# Patient Record
Sex: FEMALE | Race: WHITE | Employment: OTHER | URBAN - METROPOLITAN AREA
[De-identification: names, ages, dates, MRNs, and addresses within clinical notes are randomized per-mention and may not be internally consistent; named-entity substitution may affect disease eponyms.]

---

## 2018-01-01 ENCOUNTER — APPOINTMENT (OUTPATIENT)
Dept: GENERAL RADIOLOGY | Facility: CLINIC | Age: 68
DRG: 270 | End: 2018-01-01
Attending: INTERNAL MEDICINE
Payer: MEDICARE

## 2018-01-01 ENCOUNTER — APPOINTMENT (OUTPATIENT)
Dept: GENERAL RADIOLOGY | Facility: OTHER | Age: 68
End: 2018-01-01
Attending: PHYSICIAN ASSISTANT
Payer: MEDICARE

## 2018-01-01 ENCOUNTER — TRANSFERRED RECORDS (OUTPATIENT)
Dept: HEALTH INFORMATION MANAGEMENT | Facility: OTHER | Age: 68
End: 2018-01-01

## 2018-01-01 ENCOUNTER — ANCILLARY PROCEDURE (OUTPATIENT)
Dept: CARDIOLOGY | Facility: CLINIC | Age: 68
DRG: 270 | End: 2018-01-01
Attending: INTERNAL MEDICINE
Payer: MEDICARE

## 2018-01-01 ENCOUNTER — HOSPITAL ENCOUNTER (EMERGENCY)
Facility: OTHER | Age: 68
Discharge: ANOTHER HOSPITAL | End: 2018-09-09
Attending: PHYSICIAN ASSISTANT | Admitting: PHYSICIAN ASSISTANT
Payer: MEDICARE

## 2018-01-01 ENCOUNTER — HOSPITAL ENCOUNTER (INPATIENT)
Facility: CLINIC | Age: 68
LOS: 18 days | DRG: 270 | End: 2019-01-17
Attending: INTERNAL MEDICINE | Admitting: INTERNAL MEDICINE
Payer: MEDICARE

## 2018-01-01 VITALS
HEIGHT: 60 IN | HEART RATE: 69 BPM | RESPIRATION RATE: 20 BRPM | OXYGEN SATURATION: 94 % | SYSTOLIC BLOOD PRESSURE: 118 MMHG | DIASTOLIC BLOOD PRESSURE: 71 MMHG | TEMPERATURE: 98 F

## 2018-01-01 DIAGNOSIS — R79.89 ELEVATED TROPONIN: ICD-10-CM

## 2018-01-01 DIAGNOSIS — I47.20 VENTRICULAR TACHYCARDIA (H): ICD-10-CM

## 2018-01-01 DIAGNOSIS — K92.1 GASTROINTESTINAL HEMORRHAGE WITH MELENA: ICD-10-CM

## 2018-01-01 DIAGNOSIS — I50.43 ACUTE ON CHRONIC COMBINED SYSTOLIC AND DIASTOLIC CONGESTIVE HEART FAILURE (H): ICD-10-CM

## 2018-01-01 DIAGNOSIS — E87.8 ELECTROLYTE ABNORMALITY: ICD-10-CM

## 2018-01-01 DIAGNOSIS — I47.20 VT (VENTRICULAR TACHYCARDIA) (H): ICD-10-CM

## 2018-01-01 DIAGNOSIS — R57.0 CARDIOGENIC SHOCK (H): Primary | ICD-10-CM

## 2018-01-01 LAB
ABO + RH BLD: NORMAL
ABO + RH BLD: NORMAL
ALBUMIN SERPL-MCNC: 3.6 G/DL (ref 3.4–5)
ALBUMIN SERPL-MCNC: 3.7 G/DL (ref 3.4–5)
ALBUMIN SERPL-MCNC: 4.2 G/DL (ref 3.5–5.7)
ALP SERPL-CCNC: 53 U/L (ref 40–150)
ALP SERPL-CCNC: 55 U/L (ref 34–104)
ALP SERPL-CCNC: 57 U/L (ref 40–150)
ALT SERPL W P-5'-P-CCNC: 20 U/L (ref 7–52)
ALT SERPL W P-5'-P-CCNC: 84 U/L (ref 0–50)
ALT SERPL W P-5'-P-CCNC: 92 U/L (ref 0–50)
ANION GAP SERPL CALCULATED.3IONS-SCNC: 10 MMOL/L (ref 3–14)
ANION GAP SERPL CALCULATED.3IONS-SCNC: 10 MMOL/L (ref 3–14)
ANION GAP SERPL CALCULATED.3IONS-SCNC: 11 MMOL/L (ref 3–14)
ANION GAP SERPL CALCULATED.3IONS-SCNC: 11 MMOL/L (ref 3–14)
ANION GAP SERPL CALCULATED.3IONS-SCNC: 5 MMOL/L (ref 3–14)
ANION GAP SERPL CALCULATED.3IONS-SCNC: 8 MMOL/L (ref 3–14)
ANION GAP SERPL CALCULATED.3IONS-SCNC: 9 MMOL/L (ref 3–14)
AST SERPL W P-5'-P-CCNC: 21 U/L (ref 13–39)
AST SERPL W P-5'-P-CCNC: 45 U/L (ref 0–45)
AST SERPL W P-5'-P-CCNC: 56 U/L (ref 0–45)
BASE DEFICIT BLDV-SCNC: 0.2 MMOL/L
BASE DEFICIT BLDV-SCNC: 1.1 MMOL/L
BASE DEFICIT BLDV-SCNC: 11 MMOL/L
BASE DEFICIT BLDV-SCNC: 2.1 MMOL/L
BASE DEFICIT BLDV-SCNC: 6.1 MMOL/L
BASE DEFICIT BLDV-SCNC: 9.7 MMOL/L
BASE EXCESS BLDA CALC-SCNC: 10.8 MMOL/L
BASE EXCESS BLDA CALC-SCNC: 4.5 MMOL/L
BASE EXCESS BLDA CALC-SCNC: 6.2 MMOL/L
BASE EXCESS BLDA CALC-SCNC: 6.5 MMOL/L
BASE EXCESS BLDA CALC-SCNC: 8.8 MMOL/L
BASE EXCESS BLDV CALC-SCNC: 1.7 MMOL/L
BASE EXCESS BLDV CALC-SCNC: 2.2 MMOL/L
BASE EXCESS BLDV CALC-SCNC: 4.8 MMOL/L
BASE EXCESS BLDV CALC-SCNC: 6.3 MMOL/L
BASE EXCESS BLDV CALC-SCNC: 6.6 MMOL/L
BASOPHILS # BLD AUTO: 0 10E9/L (ref 0–0.2)
BASOPHILS NFR BLD AUTO: 0.3 %
BILIRUB SERPL-MCNC: 1 MG/DL (ref 0.2–1.3)
BILIRUB SERPL-MCNC: 1.1 MG/DL (ref 0.3–1)
BILIRUB SERPL-MCNC: 1.2 MG/DL (ref 0.2–1.3)
BLD GP AB SCN SERPL QL: NORMAL
BLOOD BANK CMNT PATIENT-IMP: NORMAL
BUN SERPL-MCNC: 27 MG/DL (ref 7–30)
BUN SERPL-MCNC: 28 MG/DL (ref 7–30)
BUN SERPL-MCNC: 31 MG/DL (ref 7–30)
BUN SERPL-MCNC: 32 MG/DL (ref 7–30)
BUN SERPL-MCNC: 33 MG/DL (ref 7–30)
BUN SERPL-MCNC: 34 MG/DL (ref 7–30)
BUN SERPL-MCNC: 36 MG/DL (ref 7–25)
CA-I BLD-MCNC: 4.4 MG/DL (ref 4.4–5.2)
CALCIUM SERPL-MCNC: 7.5 MG/DL (ref 8.5–10.1)
CALCIUM SERPL-MCNC: 8.2 MG/DL (ref 8.5–10.1)
CALCIUM SERPL-MCNC: 8.2 MG/DL (ref 8.5–10.1)
CALCIUM SERPL-MCNC: 8.4 MG/DL (ref 8.5–10.1)
CALCIUM SERPL-MCNC: 8.9 MG/DL (ref 8.6–10.3)
CHLORIDE SERPL-SCNC: 102 MMOL/L (ref 94–109)
CHLORIDE SERPL-SCNC: 90 MMOL/L (ref 94–109)
CHLORIDE SERPL-SCNC: 90 MMOL/L (ref 94–109)
CHLORIDE SERPL-SCNC: 92 MMOL/L (ref 94–109)
CHLORIDE SERPL-SCNC: 94 MMOL/L (ref 94–109)
CHLORIDE SERPL-SCNC: 94 MMOL/L (ref 98–107)
CHLORIDE SERPL-SCNC: 97 MMOL/L (ref 94–109)
CO2 SERPL-SCNC: 24 MMOL/L (ref 20–32)
CO2 SERPL-SCNC: 24 MMOL/L (ref 21–31)
CO2 SERPL-SCNC: 26 MMOL/L (ref 20–32)
CO2 SERPL-SCNC: 27 MMOL/L (ref 20–32)
CO2 SERPL-SCNC: 27 MMOL/L (ref 20–32)
CO2 SERPL-SCNC: 29 MMOL/L (ref 20–32)
CO2 SERPL-SCNC: 31 MMOL/L (ref 20–32)
CREAT SERPL-MCNC: 1.42 MG/DL (ref 0.52–1.04)
CREAT SERPL-MCNC: 1.42 MG/DL (ref 0.6–1.2)
CREAT SERPL-MCNC: 1.58 MG/DL (ref 0.52–1.04)
CREAT SERPL-MCNC: 1.96 MG/DL (ref 0.52–1.04)
CREAT SERPL-MCNC: 2.05 MG/DL (ref 0.52–1.04)
CREAT SERPL-MCNC: 2.16 MG/DL (ref 0.52–1.04)
CREAT SERPL-MCNC: 2.19 MG/DL (ref 0.52–1.04)
DIFFERENTIAL METHOD BLD: ABNORMAL
EOSINOPHIL # BLD AUTO: 0 10E9/L (ref 0–0.7)
EOSINOPHIL NFR BLD AUTO: 0.5 %
ERYTHROCYTE [DISTWIDTH] IN BLOOD BY AUTOMATED COUNT: 14 % (ref 10–15)
ERYTHROCYTE [DISTWIDTH] IN BLOOD BY AUTOMATED COUNT: 14.7 % (ref 10–15)
ERYTHROCYTE [DISTWIDTH] IN BLOOD BY AUTOMATED COUNT: 14.8 % (ref 10–15)
ERYTHROCYTE [DISTWIDTH] IN BLOOD BY AUTOMATED COUNT: 15.2 % (ref 10–15)
GFR SERPL CREATININE-BSD FRML MDRD: 22 ML/MIN/{1.73_M2}
GFR SERPL CREATININE-BSD FRML MDRD: 23 ML/MIN/{1.73_M2}
GFR SERPL CREATININE-BSD FRML MDRD: 24 ML/MIN/{1.73_M2}
GFR SERPL CREATININE-BSD FRML MDRD: 26 ML/MIN/{1.73_M2}
GFR SERPL CREATININE-BSD FRML MDRD: 33 ML/MIN/{1.73_M2}
GFR SERPL CREATININE-BSD FRML MDRD: 37 ML/MIN/1.7M2
GFR SERPL CREATININE-BSD FRML MDRD: 38 ML/MIN/{1.73_M2}
GLUCOSE BLDC GLUCOMTR-MCNC: 100 MG/DL (ref 70–99)
GLUCOSE BLDC GLUCOMTR-MCNC: 101 MG/DL (ref 70–99)
GLUCOSE BLDC GLUCOMTR-MCNC: 104 MG/DL (ref 70–99)
GLUCOSE BLDC GLUCOMTR-MCNC: 111 MG/DL (ref 70–99)
GLUCOSE BLDC GLUCOMTR-MCNC: 115 MG/DL (ref 70–99)
GLUCOSE BLDC GLUCOMTR-MCNC: 115 MG/DL (ref 70–99)
GLUCOSE BLDC GLUCOMTR-MCNC: 123 MG/DL (ref 70–99)
GLUCOSE BLDC GLUCOMTR-MCNC: 126 MG/DL (ref 70–99)
GLUCOSE BLDC GLUCOMTR-MCNC: 131 MG/DL (ref 70–99)
GLUCOSE BLDC GLUCOMTR-MCNC: 133 MG/DL (ref 70–99)
GLUCOSE BLDC GLUCOMTR-MCNC: 328 MG/DL (ref 70–99)
GLUCOSE BLDC GLUCOMTR-MCNC: 45 MG/DL (ref 70–99)
GLUCOSE BLDC GLUCOMTR-MCNC: 97 MG/DL (ref 70–99)
GLUCOSE SERPL-MCNC: 102 MG/DL (ref 70–99)
GLUCOSE SERPL-MCNC: 103 MG/DL (ref 70–99)
GLUCOSE SERPL-MCNC: 110 MG/DL (ref 70–99)
GLUCOSE SERPL-MCNC: 112 MG/DL (ref 70–99)
GLUCOSE SERPL-MCNC: 118 MG/DL (ref 70–99)
GLUCOSE SERPL-MCNC: 149 MG/DL (ref 70–105)
GLUCOSE SERPL-MCNC: 94 MG/DL (ref 70–99)
HBA1C MFR BLD: 5.1 % (ref 0–5.6)
HCO3 BLD-SCNC: 28 MMOL/L (ref 21–28)
HCO3 BLD-SCNC: 29 MMOL/L (ref 21–28)
HCO3 BLD-SCNC: 30 MMOL/L (ref 21–28)
HCO3 BLD-SCNC: 30 MMOL/L (ref 21–28)
HCO3 BLD-SCNC: 32 MMOL/L (ref 21–28)
HCO3 BLDV-SCNC: 14 MMOL/L (ref 21–28)
HCO3 BLDV-SCNC: 14 MMOL/L (ref 21–28)
HCO3 BLDV-SCNC: 17 MMOL/L (ref 21–28)
HCO3 BLDV-SCNC: 21 MMOL/L (ref 21–28)
HCO3 BLDV-SCNC: 24 MMOL/L (ref 21–28)
HCO3 BLDV-SCNC: 25 MMOL/L (ref 21–28)
HCO3 BLDV-SCNC: 27 MMOL/L (ref 21–28)
HCO3 BLDV-SCNC: 28 MMOL/L (ref 21–28)
HCO3 BLDV-SCNC: 29 MMOL/L (ref 21–28)
HCO3 BLDV-SCNC: 29 MMOL/L (ref 21–28)
HCO3 BLDV-SCNC: 30 MMOL/L (ref 21–28)
HCT VFR BLD AUTO: 27.7 % (ref 35–47)
HCT VFR BLD AUTO: 28.4 % (ref 35–47)
HCT VFR BLD AUTO: 29.1 % (ref 35–47)
HCT VFR BLD AUTO: 29.6 % (ref 35–47)
HGB BLD-MCNC: 9.2 G/DL (ref 11.7–15.7)
HGB BLD-MCNC: 9.3 G/DL (ref 11.7–15.7)
HGB BLD-MCNC: 9.3 G/DL (ref 11.7–15.7)
HGB BLD-MCNC: 9.5 G/DL (ref 11.7–15.7)
ICDO DEVICE COMMENTS: NORMAL
IMM GRANULOCYTES # BLD: 0.1 10E9/L (ref 0–0.4)
IMM GRANULOCYTES NFR BLD: 1 %
INR PPP: 2.44 (ref 0.86–1.14)
INR PPP: 2.49 (ref 0.86–1.14)
INR PPP: 5.34 (ref 0–1.3)
INTERPRETATION ECG - MUSE: NORMAL
LACTATE BLD-SCNC: 0.7 MMOL/L (ref 0.7–2)
LACTATE BLD-SCNC: 1 MMOL/L (ref 0.7–2)
LACTATE SERPL-SCNC: 1.2 MMOL/L (ref 0.5–2.2)
LIDOCAIN SERPL SCN-MCNC: 8.3 UG/ML
LMWH PPP CHRO-ACNC: 0.26 IU/ML
LMWH PPP CHRO-ACNC: 0.31 IU/ML
LMWH PPP CHRO-ACNC: 0.62 IU/ML
LMWH PPP CHRO-ACNC: <0.1 IU/ML
LYMPHOCYTES # BLD AUTO: 0.8 10E9/L (ref 0.8–5.3)
LYMPHOCYTES NFR BLD AUTO: 13 %
MAGNESIUM SERPL-MCNC: 2.3 MG/DL (ref 1.6–2.3)
MAGNESIUM SERPL-MCNC: 2.4 MG/DL (ref 1.6–2.3)
MAGNESIUM SERPL-MCNC: 2.5 MG/DL (ref 1.6–2.3)
MAGNESIUM SERPL-MCNC: 2.5 MG/DL (ref 1.6–2.3)
MAGNESIUM SERPL-MCNC: 2.6 MG/DL (ref 1.6–2.3)
MCH RBC QN AUTO: 28.8 PG (ref 26.5–33)
MCH RBC QN AUTO: 28.8 PG (ref 26.5–33)
MCH RBC QN AUTO: 29.6 PG (ref 26.5–33)
MCH RBC QN AUTO: 29.7 PG (ref 26.5–33)
MCHC RBC AUTO-ENTMCNC: 32 G/DL (ref 31.5–36.5)
MCHC RBC AUTO-ENTMCNC: 32.1 G/DL (ref 31.5–36.5)
MCHC RBC AUTO-ENTMCNC: 32.4 G/DL (ref 31.5–36.5)
MCHC RBC AUTO-ENTMCNC: 33.6 G/DL (ref 31.5–36.5)
MCV RBC AUTO: 88 FL (ref 78–100)
MCV RBC AUTO: 89 FL (ref 78–100)
MCV RBC AUTO: 90 FL (ref 78–100)
MCV RBC AUTO: 93 FL (ref 78–100)
MDC_IDC_LEAD_LOCATION: NORMAL
MDC_IDC_LEAD_LOCATION: NORMAL
MDC_IDC_LEAD_LOCATION_DETAIL_1: NORMAL
MDC_IDC_LEAD_LOCATION_DETAIL_1: NORMAL
MDC_IDC_LEAD_MFG: NORMAL
MDC_IDC_LEAD_MFG: NORMAL
MDC_IDC_LEAD_MODEL: NORMAL
MDC_IDC_LEAD_MODEL: NORMAL
MDC_IDC_LEAD_POLARITY_TYPE: NORMAL
MDC_IDC_LEAD_POLARITY_TYPE: NORMAL
MDC_IDC_LEAD_SERIAL: NORMAL
MDC_IDC_LEAD_SERIAL: NORMAL
MDC_IDC_LEAD_SPECIAL_FUNCTION: NORMAL
MDC_IDC_LEAD_SPECIAL_FUNCTION: NORMAL
MDC_IDC_MSMT_BATTERY_DTM: NORMAL
MDC_IDC_MSMT_BATTERY_REMAINING_LONGEVITY: 96 MO
MDC_IDC_MSMT_BATTERY_STATUS: NORMAL
MDC_IDC_MSMT_CAP_CHARGE_DTM: NORMAL
MDC_IDC_MSMT_CAP_CHARGE_ENERGY: 41 J
MDC_IDC_MSMT_CAP_CHARGE_TIME: 9.26
MDC_IDC_MSMT_CAP_CHARGE_TIME: 9.26
MDC_IDC_MSMT_CAP_CHARGE_TYPE: NORMAL
MDC_IDC_MSMT_CAP_CHARGE_TYPE: NORMAL
MDC_IDC_PG_IMPLANT_DTM: NORMAL
MDC_IDC_PG_MFG: NORMAL
MDC_IDC_PG_MODEL: NORMAL
MDC_IDC_PG_SERIAL: NORMAL
MDC_IDC_PG_TYPE: NORMAL
MDC_IDC_SESS_CLINIC_NAME: NORMAL
MDC_IDC_SESS_DTM: NORMAL
MDC_IDC_SESS_TYPE: NORMAL
MDC_IDC_SET_BRADY_AT_MODE_SWITCH_MODE: NORMAL
MDC_IDC_SET_BRADY_AT_MODE_SWITCH_RATE: 150 {BEATS}/MIN
MDC_IDC_SET_BRADY_LOWRATE: 70 {BEATS}/MIN
MDC_IDC_SET_BRADY_MAX_SENSOR_RATE: 80 {BEATS}/MIN
MDC_IDC_SET_BRADY_MAX_TRACKING_RATE: 80 {BEATS}/MIN
MDC_IDC_SET_BRADY_MODE: NORMAL
MDC_IDC_SET_BRADY_PAV_DELAY_HIGH: 180 MS
MDC_IDC_SET_BRADY_PAV_DELAY_LOW: 180 MS
MDC_IDC_SET_BRADY_SAV_DELAY_HIGH: 150 MS
MDC_IDC_SET_BRADY_SAV_DELAY_LOW: 150 MS
MDC_IDC_SET_LEADCHNL_RA_PACING_AMPLITUDE: 3 V
MDC_IDC_SET_LEADCHNL_RA_PACING_ANODE_ELECTRODE_1: NORMAL
MDC_IDC_SET_LEADCHNL_RA_PACING_ANODE_LOCATION_1: NORMAL
MDC_IDC_SET_LEADCHNL_RA_PACING_CAPTURE_MODE: NORMAL
MDC_IDC_SET_LEADCHNL_RA_PACING_CATHODE_ELECTRODE_1: NORMAL
MDC_IDC_SET_LEADCHNL_RA_PACING_CATHODE_LOCATION_1: NORMAL
MDC_IDC_SET_LEADCHNL_RA_PACING_POLARITY: NORMAL
MDC_IDC_SET_LEADCHNL_RA_PACING_PULSEWIDTH: 0.4 MS
MDC_IDC_SET_LEADCHNL_RA_SENSING_ADAPTATION_MODE: NORMAL
MDC_IDC_SET_LEADCHNL_RA_SENSING_ANODE_ELECTRODE_1: NORMAL
MDC_IDC_SET_LEADCHNL_RA_SENSING_ANODE_LOCATION_1: NORMAL
MDC_IDC_SET_LEADCHNL_RA_SENSING_CATHODE_ELECTRODE_1: NORMAL
MDC_IDC_SET_LEADCHNL_RA_SENSING_CATHODE_LOCATION_1: NORMAL
MDC_IDC_SET_LEADCHNL_RA_SENSING_POLARITY: NORMAL
MDC_IDC_SET_LEADCHNL_RA_SENSING_SENSITIVITY: 0.15 MV
MDC_IDC_SET_LEADCHNL_RV_PACING_AMPLITUDE: 2 V
MDC_IDC_SET_LEADCHNL_RV_PACING_ANODE_ELECTRODE_1: NORMAL
MDC_IDC_SET_LEADCHNL_RV_PACING_ANODE_LOCATION_1: NORMAL
MDC_IDC_SET_LEADCHNL_RV_PACING_CAPTURE_MODE: NORMAL
MDC_IDC_SET_LEADCHNL_RV_PACING_CATHODE_ELECTRODE_1: NORMAL
MDC_IDC_SET_LEADCHNL_RV_PACING_CATHODE_LOCATION_1: NORMAL
MDC_IDC_SET_LEADCHNL_RV_PACING_POLARITY: NORMAL
MDC_IDC_SET_LEADCHNL_RV_PACING_PULSEWIDTH: 0.4 MS
MDC_IDC_SET_LEADCHNL_RV_SENSING_ADAPTATION_MODE: NORMAL
MDC_IDC_SET_LEADCHNL_RV_SENSING_ANODE_ELECTRODE_1: NORMAL
MDC_IDC_SET_LEADCHNL_RV_SENSING_ANODE_LOCATION_1: NORMAL
MDC_IDC_SET_LEADCHNL_RV_SENSING_CATHODE_ELECTRODE_1: NORMAL
MDC_IDC_SET_LEADCHNL_RV_SENSING_CATHODE_LOCATION_1: NORMAL
MDC_IDC_SET_LEADCHNL_RV_SENSING_POLARITY: NORMAL
MDC_IDC_SET_LEADCHNL_RV_SENSING_SENSITIVITY: 0.6 MV
MDC_IDC_SET_ZONE_DETECTION_INTERVAL: 300 MS
MDC_IDC_SET_ZONE_DETECTION_INTERVAL: 375 MS
MDC_IDC_SET_ZONE_DETECTION_INTERVAL: 667 MS
MDC_IDC_SET_ZONE_TYPE: NORMAL
MDC_IDC_SET_ZONE_VENDOR_TYPE: NORMAL
MDC_IDC_STAT_BRADY_RA_PERCENT_PACED: 100 %
MDC_IDC_STAT_BRADY_RV_PERCENT_PACED: 86 %
MDC_IDC_STAT_EPISODE_RECENT_COUNT: 0
MDC_IDC_STAT_EPISODE_RECENT_COUNT: 38
MDC_IDC_STAT_EPISODE_RECENT_COUNT: 44
MDC_IDC_STAT_EPISODE_RECENT_COUNT_DTM_END: NORMAL
MDC_IDC_STAT_EPISODE_RECENT_COUNT_DTM_START: NORMAL
MDC_IDC_STAT_EPISODE_TOTAL_COUNT: 0
MDC_IDC_STAT_EPISODE_TOTAL_COUNT: 55
MDC_IDC_STAT_EPISODE_TOTAL_COUNT: 62
MDC_IDC_STAT_EPISODE_TOTAL_COUNT_DTM_END: NORMAL
MDC_IDC_STAT_EPISODE_TYPE: NORMAL
MDC_IDC_STAT_EPISODE_VENDOR_TYPE: NORMAL
MDC_IDC_STAT_TACHYTHERAPY_ATP_DELIVERED_RECENT: 61
MDC_IDC_STAT_TACHYTHERAPY_ATP_DELIVERED_TOTAL: 73
MDC_IDC_STAT_TACHYTHERAPY_RECENT_DTM_END: NORMAL
MDC_IDC_STAT_TACHYTHERAPY_RECENT_DTM_START: NORMAL
MDC_IDC_STAT_TACHYTHERAPY_SHOCKS_ABORTED_RECENT: 0
MDC_IDC_STAT_TACHYTHERAPY_SHOCKS_ABORTED_TOTAL: 0
MDC_IDC_STAT_TACHYTHERAPY_SHOCKS_DELIVERED_RECENT: 1
MDC_IDC_STAT_TACHYTHERAPY_SHOCKS_DELIVERED_TOTAL: 5
MDC_IDC_STAT_TACHYTHERAPY_TOTAL_DTM_END: NORMAL
MONOCYTES # BLD AUTO: 0.5 10E9/L (ref 0–1.3)
MONOCYTES NFR BLD AUTO: 8.5 %
NEUTROPHILS # BLD AUTO: 4.8 10E9/L (ref 1.6–8.3)
NEUTROPHILS NFR BLD AUTO: 76.7 %
NT-PROBNP SERPL-MCNC: 5135 PG/ML (ref 0–900)
NT-PROBNP SERPL-MCNC: 861 PG/ML (ref 0–100)
O2/TOTAL GAS SETTING VFR VENT: 40 %
O2/TOTAL GAS SETTING VFR VENT: 60 %
O2/TOTAL GAS SETTING VFR VENT: 60 %
O2/TOTAL GAS SETTING VFR VENT: ABNORMAL %
OXYHGB MFR BLD: 97 % (ref 92–100)
OXYHGB MFR BLD: 97 % (ref 92–100)
OXYHGB MFR BLD: 98 % (ref 92–100)
OXYHGB MFR BLDV: 66 %
OXYHGB MFR BLDV: 66 %
OXYHGB MFR BLDV: 67 %
OXYHGB MFR BLDV: 68 %
OXYHGB MFR BLDV: 69 %
OXYHGB MFR BLDV: 70 %
OXYHGB MFR BLDV: 71 %
OXYHGB MFR BLDV: 73 %
OXYHGB MFR BLDV: 73 %
OXYHGB MFR BLDV: 75 %
OXYHGB MFR BLDV: 77 %
PCO2 BLD: 30 MM HG (ref 35–45)
PCO2 BLD: 31 MM HG (ref 35–45)
PCO2 BLD: 32 MM HG (ref 35–45)
PCO2 BLD: 36 MM HG (ref 35–45)
PCO2 BLD: 36 MM HG (ref 35–45)
PCO2 BLDV: 21 MM HG (ref 40–50)
PCO2 BLDV: 25 MM HG (ref 40–50)
PCO2 BLDV: 26 MM HG (ref 40–50)
PCO2 BLDV: 30 MM HG (ref 40–50)
PCO2 BLDV: 31 MM HG (ref 40–50)
PCO2 BLDV: 39 MM HG (ref 40–50)
PCO2 BLDV: 39 MM HG (ref 40–50)
PCO2 BLDV: 40 MM HG (ref 40–50)
PCO2 BLDV: 41 MM HG (ref 40–50)
PCO2 BLDV: 45 MM HG (ref 40–50)
PCO2 BLDV: 50 MM HG (ref 40–50)
PH BLD: 7.5 PH (ref 7.35–7.45)
PH BLD: 7.53 PH (ref 7.35–7.45)
PH BLD: 7.55 PH (ref 7.35–7.45)
PH BLD: 7.61 PH (ref 7.35–7.45)
PH BLD: 7.63 PH (ref 7.35–7.45)
PH BLDV: 7.33 PH (ref 7.32–7.43)
PH BLDV: 7.35 PH (ref 7.32–7.43)
PH BLDV: 7.39 PH (ref 7.32–7.43)
PH BLDV: 7.39 PH (ref 7.32–7.43)
PH BLDV: 7.41 PH (ref 7.32–7.43)
PH BLDV: 7.43 PH (ref 7.32–7.43)
PH BLDV: 7.45 PH (ref 7.32–7.43)
PH BLDV: 7.46 PH (ref 7.32–7.43)
PH BLDV: 7.47 PH (ref 7.32–7.43)
PH BLDV: 7.48 PH (ref 7.32–7.43)
PH BLDV: 7.58 PH (ref 7.32–7.43)
PHOSPHATE SERPL-MCNC: 3.3 MG/DL (ref 2.5–4.5)
PHOSPHATE SERPL-MCNC: 4.2 MG/DL (ref 2.5–4.5)
PHOSPHATE SERPL-MCNC: 4.2 MG/DL (ref 2.5–4.5)
PHOSPHATE SERPL-MCNC: 4.4 MG/DL (ref 2.5–4.5)
PLATELET # BLD AUTO: 153 10E9/L (ref 150–450)
PLATELET # BLD AUTO: 170 10E9/L (ref 150–450)
PLATELET # BLD AUTO: 183 10E9/L (ref 150–450)
PLATELET # BLD AUTO: 210 10E9/L (ref 150–450)
PO2 BLD: 177 MM HG (ref 80–105)
PO2 BLD: 179 MM HG (ref 80–105)
PO2 BLD: 180 MM HG (ref 80–105)
PO2 BLD: 184 MM HG (ref 80–105)
PO2 BLD: 304 MM HG (ref 80–105)
PO2 BLDV: 36 MM HG (ref 25–47)
PO2 BLDV: 38 MM HG (ref 25–47)
PO2 BLDV: 38 MM HG (ref 25–47)
PO2 BLDV: 39 MM HG (ref 25–47)
PO2 BLDV: 40 MM HG (ref 25–47)
PO2 BLDV: 41 MM HG (ref 25–47)
PO2 BLDV: 43 MM HG (ref 25–47)
POTASSIUM BLD-SCNC: 2 MMOL/L (ref 3.4–5.3)
POTASSIUM BLD-SCNC: 2.5 MMOL/L (ref 3.4–5.3)
POTASSIUM BLD-SCNC: 3.7 MMOL/L (ref 3.4–5.3)
POTASSIUM BLD-SCNC: 4.3 MMOL/L (ref 3.4–5.3)
POTASSIUM SERPL-SCNC: 3.6 MMOL/L (ref 3.4–5.3)
POTASSIUM SERPL-SCNC: 3.7 MMOL/L (ref 3.4–5.3)
POTASSIUM SERPL-SCNC: 3.7 MMOL/L (ref 3.4–5.3)
POTASSIUM SERPL-SCNC: 3.8 MMOL/L (ref 3.4–5.3)
POTASSIUM SERPL-SCNC: 4.1 MMOL/L (ref 3.4–5.3)
POTASSIUM SERPL-SCNC: 4.1 MMOL/L (ref 3.4–5.3)
POTASSIUM SERPL-SCNC: 4.2 MMOL/L (ref 3.4–5.3)
POTASSIUM SERPL-SCNC: 4.3 MMOL/L (ref 3.4–5.3)
POTASSIUM SERPL-SCNC: 4.7 MMOL/L (ref 3.4–5.3)
POTASSIUM SERPL-SCNC: 5.2 MMOL/L (ref 3.5–5.1)
POTASSIUM SERPL-SCNC: NORMAL MMOL/L (ref 3.4–5.3)
PROT SERPL-MCNC: 6.9 G/DL (ref 6.4–8.9)
PROT SERPL-MCNC: 7 G/DL (ref 6.8–8.8)
PROT SERPL-MCNC: 7.2 G/DL (ref 6.8–8.8)
RBC # BLD AUTO: 3.14 10E12/L (ref 3.8–5.2)
RBC # BLD AUTO: 3.2 10E12/L (ref 3.8–5.2)
RBC # BLD AUTO: 3.2 10E12/L (ref 3.8–5.2)
RBC # BLD AUTO: 3.23 10E12/L (ref 3.8–5.2)
SODIUM SERPL-SCNC: 127 MMOL/L (ref 134–144)
SODIUM SERPL-SCNC: 129 MMOL/L (ref 133–144)
SODIUM SERPL-SCNC: 130 MMOL/L (ref 133–144)
SODIUM SERPL-SCNC: 130 MMOL/L (ref 133–144)
SODIUM SERPL-SCNC: 131 MMOL/L (ref 133–144)
SODIUM SERPL-SCNC: 131 MMOL/L (ref 133–144)
SODIUM SERPL-SCNC: 134 MMOL/L (ref 133–144)
SPECIMEN EXP DATE BLD: NORMAL
TROPONIN I SERPL-MCNC: 0.04 UG/L (ref 0–0.03)
TROPONIN I SERPL-MCNC: 0.04 UG/L (ref 0–0.03)
TROPONIN I SERPL-MCNC: 0.1 UG/L (ref 0–0.04)
TROPONIN I SERPL-MCNC: 0.11 UG/L (ref 0–0.04)
WBC # BLD AUTO: 4.9 10E9/L (ref 4–11)
WBC # BLD AUTO: 6.2 10E9/L (ref 4–11)
WBC # BLD AUTO: 8.6 10E9/L (ref 4–11)
WBC # BLD AUTO: 9.2 10E9/L (ref 4–11)

## 2018-01-01 PROCEDURE — 85025 COMPLETE CBC W/AUTO DIFF WBC: CPT | Performed by: PHYSICIAN ASSISTANT

## 2018-01-01 PROCEDURE — 40000081 ZZH STATISTIC INSERT IABP

## 2018-01-01 PROCEDURE — 99152 MOD SED SAME PHYS/QHP 5/>YRS: CPT

## 2018-01-01 PROCEDURE — 82805 BLOOD GASES W/O2 SATURATION: CPT | Performed by: STUDENT IN AN ORGANIZED HEALTH CARE EDUCATION/TRAINING PROGRAM

## 2018-01-01 PROCEDURE — 33967 INSERT I-AORT PERCUT DEVICE: CPT | Mod: GC | Performed by: INTERNAL MEDICINE

## 2018-01-01 PROCEDURE — 84484 ASSAY OF TROPONIN QUANT: CPT | Performed by: STUDENT IN AN ORGANIZED HEALTH CARE EDUCATION/TRAINING PROGRAM

## 2018-01-01 PROCEDURE — 93283 PRGRMG EVAL IMPLANTABLE DFB: CPT

## 2018-01-01 PROCEDURE — 85520 HEPARIN ASSAY: CPT | Performed by: STUDENT IN AN ORGANIZED HEALTH CARE EDUCATION/TRAINING PROGRAM

## 2018-01-01 PROCEDURE — 36415 COLL VENOUS BLD VENIPUNCTURE: CPT | Performed by: PHYSICIAN ASSISTANT

## 2018-01-01 PROCEDURE — 40000048 ZZH STATISTIC DAILY SWAN MONITORING

## 2018-01-01 PROCEDURE — 71045 X-RAY EXAM CHEST 1 VIEW: CPT

## 2018-01-01 PROCEDURE — 83880 ASSAY OF NATRIURETIC PEPTIDE: CPT | Performed by: PHYSICIAN ASSISTANT

## 2018-01-01 PROCEDURE — 27210794 ZZH OR GENERAL SUPPLY STERILE: Performed by: INTERNAL MEDICINE

## 2018-01-01 PROCEDURE — 20000004 ZZH R&B ICU UMMC

## 2018-01-01 PROCEDURE — 40000986 XR CHEST PORT 1 VW

## 2018-01-01 PROCEDURE — 84484 ASSAY OF TROPONIN QUANT: CPT | Mod: 91 | Performed by: PHYSICIAN ASSISTANT

## 2018-01-01 PROCEDURE — 27210136 ZZH KIT CATH ARTERIAL EXT SUPPLY

## 2018-01-01 PROCEDURE — 25000128 H RX IP 250 OP 636: Performed by: STUDENT IN AN ORGANIZED HEALTH CARE EDUCATION/TRAINING PROGRAM

## 2018-01-01 PROCEDURE — 80048 BASIC METABOLIC PNL TOTAL CA: CPT | Performed by: INTERNAL MEDICINE

## 2018-01-01 PROCEDURE — 82805 BLOOD GASES W/O2 SATURATION: CPT | Performed by: INTERNAL MEDICINE

## 2018-01-01 PROCEDURE — A9270 NON-COVERED ITEM OR SERVICE: HCPCS | Mod: GY | Performed by: INTERNAL MEDICINE

## 2018-01-01 PROCEDURE — 83036 HEMOGLOBIN GLYCOSYLATED A1C: CPT | Performed by: STUDENT IN AN ORGANIZED HEALTH CARE EDUCATION/TRAINING PROGRAM

## 2018-01-01 PROCEDURE — 94003 VENT MGMT INPAT SUBQ DAY: CPT

## 2018-01-01 PROCEDURE — 85610 PROTHROMBIN TIME: CPT | Performed by: PHYSICIAN ASSISTANT

## 2018-01-01 PROCEDURE — 40000196 ZZH STATISTIC RAPCV CVP MONITORING

## 2018-01-01 PROCEDURE — 96375 TX/PRO/DX INJ NEW DRUG ADDON: CPT | Performed by: PHYSICIAN ASSISTANT

## 2018-01-01 PROCEDURE — 40000014 ZZH STATISTIC ARTERIAL MONITORING DAILY

## 2018-01-01 PROCEDURE — 84100 ASSAY OF PHOSPHORUS: CPT | Performed by: STUDENT IN AN ORGANIZED HEALTH CARE EDUCATION/TRAINING PROGRAM

## 2018-01-01 PROCEDURE — 96365 THER/PROPH/DIAG IV INF INIT: CPT | Performed by: PHYSICIAN ASSISTANT

## 2018-01-01 PROCEDURE — 93010 ELECTROCARDIOGRAM REPORT: CPT | Performed by: INTERNAL MEDICINE

## 2018-01-01 PROCEDURE — A9270 NON-COVERED ITEM OR SERVICE: HCPCS | Mod: GY | Performed by: STUDENT IN AN ORGANIZED HEALTH CARE EDUCATION/TRAINING PROGRAM

## 2018-01-01 PROCEDURE — 83735 ASSAY OF MAGNESIUM: CPT | Performed by: INTERNAL MEDICINE

## 2018-01-01 PROCEDURE — 99285 EMERGENCY DEPT VISIT HI MDM: CPT | Mod: Z6 | Performed by: PHYSICIAN ASSISTANT

## 2018-01-01 PROCEDURE — 84132 ASSAY OF SERUM POTASSIUM: CPT | Performed by: STUDENT IN AN ORGANIZED HEALTH CARE EDUCATION/TRAINING PROGRAM

## 2018-01-01 PROCEDURE — 86901 BLOOD TYPING SEROLOGIC RH(D): CPT | Performed by: PHYSICIAN ASSISTANT

## 2018-01-01 PROCEDURE — 02HV33Z INSERTION OF INFUSION DEVICE INTO SUPERIOR VENA CAVA, PERCUTANEOUS APPROACH: ICD-10-PCS | Performed by: INTERNAL MEDICINE

## 2018-01-01 PROCEDURE — 93306 TTE W/DOPPLER COMPLETE: CPT | Mod: 26 | Performed by: INTERNAL MEDICINE

## 2018-01-01 PROCEDURE — C9113 INJ PANTOPRAZOLE SODIUM, VIA: HCPCS | Performed by: PHYSICIAN ASSISTANT

## 2018-01-01 PROCEDURE — 40000275 ZZH STATISTIC RCP TIME EA 10 MIN

## 2018-01-01 PROCEDURE — 33967 INSERT I-AORT PERCUT DEVICE: CPT | Performed by: INTERNAL MEDICINE

## 2018-01-01 PROCEDURE — 84132 ASSAY OF SERUM POTASSIUM: CPT | Performed by: INTERNAL MEDICINE

## 2018-01-01 PROCEDURE — 93503 INSERT/PLACE HEART CATHETER: CPT

## 2018-01-01 PROCEDURE — 99152 MOD SED SAME PHYS/QHP 5/>YRS: CPT | Performed by: INTERNAL MEDICINE

## 2018-01-01 PROCEDURE — 83605 ASSAY OF LACTIC ACID: CPT | Performed by: STUDENT IN AN ORGANIZED HEALTH CARE EDUCATION/TRAINING PROGRAM

## 2018-01-01 PROCEDURE — 25000128 H RX IP 250 OP 636

## 2018-01-01 PROCEDURE — 80053 COMPREHEN METABOLIC PANEL: CPT | Performed by: STUDENT IN AN ORGANIZED HEALTH CARE EDUCATION/TRAINING PROGRAM

## 2018-01-01 PROCEDURE — 85027 COMPLETE CBC AUTOMATED: CPT | Performed by: STUDENT IN AN ORGANIZED HEALTH CARE EDUCATION/TRAINING PROGRAM

## 2018-01-01 PROCEDURE — 25000128 H RX IP 250 OP 636: Performed by: PHYSICIAN ASSISTANT

## 2018-01-01 PROCEDURE — 99291 CRITICAL CARE FIRST HOUR: CPT | Mod: 25 | Performed by: INTERNAL MEDICINE

## 2018-01-01 PROCEDURE — 85610 PROTHROMBIN TIME: CPT | Performed by: STUDENT IN AN ORGANIZED HEALTH CARE EDUCATION/TRAINING PROGRAM

## 2018-01-01 PROCEDURE — 40000280 XR SURGERY CARM FLUORO GREATER THAN 5 MIN

## 2018-01-01 PROCEDURE — 25000132 ZZH RX MED GY IP 250 OP 250 PS 637: Mod: GY | Performed by: INTERNAL MEDICINE

## 2018-01-01 PROCEDURE — 00000146 ZZHCL STATISTIC GLUCOSE BY METER IP

## 2018-01-01 PROCEDURE — 25000132 ZZH RX MED GY IP 250 OP 250 PS 637: Mod: GY | Performed by: STUDENT IN AN ORGANIZED HEALTH CARE EDUCATION/TRAINING PROGRAM

## 2018-01-01 PROCEDURE — 25000128 H RX IP 250 OP 636: Performed by: INTERNAL MEDICINE

## 2018-01-01 PROCEDURE — 40000076 ZZH STATISTIC IABP MONITORING

## 2018-01-01 PROCEDURE — 94002 VENT MGMT INPAT INIT DAY: CPT

## 2018-01-01 PROCEDURE — P9041 ALBUMIN (HUMAN),5%, 50ML: HCPCS

## 2018-01-01 PROCEDURE — 80053 COMPREHEN METABOLIC PANEL: CPT | Performed by: PHYSICIAN ASSISTANT

## 2018-01-01 PROCEDURE — 36415 COLL VENOUS BLD VENIPUNCTURE: CPT | Mod: 91 | Performed by: PHYSICIAN ASSISTANT

## 2018-01-01 PROCEDURE — 80048 BASIC METABOLIC PNL TOTAL CA: CPT | Performed by: STUDENT IN AN ORGANIZED HEALTH CARE EDUCATION/TRAINING PROGRAM

## 2018-01-01 PROCEDURE — 86900 BLOOD TYPING SEROLOGIC ABO: CPT | Performed by: PHYSICIAN ASSISTANT

## 2018-01-01 PROCEDURE — 84484 ASSAY OF TROPONIN QUANT: CPT | Performed by: PHYSICIAN ASSISTANT

## 2018-01-01 PROCEDURE — 3E043XZ INTRODUCTION OF VASOPRESSOR INTO CENTRAL VEIN, PERCUTANEOUS APPROACH: ICD-10-PCS | Performed by: INTERNAL MEDICINE

## 2018-01-01 PROCEDURE — 93005 ELECTROCARDIOGRAM TRACING: CPT

## 2018-01-01 PROCEDURE — 25800025 ZZH RX 258

## 2018-01-01 PROCEDURE — 80176 ASSAY OF LIDOCAINE: CPT | Performed by: STUDENT IN AN ORGANIZED HEALTH CARE EDUCATION/TRAINING PROGRAM

## 2018-01-01 PROCEDURE — 5A1945Z RESPIRATORY VENTILATION, 24-96 CONSECUTIVE HOURS: ICD-10-PCS | Performed by: INTERNAL MEDICINE

## 2018-01-01 PROCEDURE — 40000264 ECHOCARDIOGRAM COMPLETE

## 2018-01-01 PROCEDURE — 99285 EMERGENCY DEPT VISIT HI MDM: CPT | Mod: 25 | Performed by: PHYSICIAN ASSISTANT

## 2018-01-01 PROCEDURE — 36415 COLL VENOUS BLD VENIPUNCTURE: CPT | Performed by: STUDENT IN AN ORGANIZED HEALTH CARE EDUCATION/TRAINING PROGRAM

## 2018-01-01 PROCEDURE — 27210305 ZZH CATH BALLOON IABP

## 2018-01-01 PROCEDURE — 76000 FLUOROSCOPY <1 HR PHYS/QHP: CPT | Mod: TC

## 2018-01-01 PROCEDURE — 40000281 ZZH STATISTIC TRANSPORT TIME EA 15 MIN

## 2018-01-01 PROCEDURE — 02HP32Z INSERTION OF MONITORING DEVICE INTO PULMONARY TRUNK, PERCUTANEOUS APPROACH: ICD-10-PCS | Performed by: INTERNAL MEDICINE

## 2018-01-01 PROCEDURE — 83735 ASSAY OF MAGNESIUM: CPT | Performed by: STUDENT IN AN ORGANIZED HEALTH CARE EDUCATION/TRAINING PROGRAM

## 2018-01-01 PROCEDURE — 25500064 ZZH RX 255 OP 636: Performed by: INTERNAL MEDICINE

## 2018-01-01 PROCEDURE — 83605 ASSAY OF LACTIC ACID: CPT | Performed by: PHYSICIAN ASSISTANT

## 2018-01-01 PROCEDURE — 25000125 ZZHC RX 250: Performed by: INTERNAL MEDICINE

## 2018-01-01 PROCEDURE — 93283 PRGRMG EVAL IMPLANTABLE DFB: CPT | Mod: 26 | Performed by: INTERNAL MEDICINE

## 2018-01-01 PROCEDURE — 86850 RBC ANTIBODY SCREEN: CPT | Performed by: PHYSICIAN ASSISTANT

## 2018-01-01 PROCEDURE — 83880 ASSAY OF NATRIURETIC PEPTIDE: CPT | Performed by: STUDENT IN AN ORGANIZED HEALTH CARE EDUCATION/TRAINING PROGRAM

## 2018-01-01 PROCEDURE — 93005 ELECTROCARDIOGRAM TRACING: CPT | Performed by: PHYSICIAN ASSISTANT

## 2018-01-01 PROCEDURE — 5A02210 ASSISTANCE WITH CARDIAC OUTPUT USING BALLOON PUMP, CONTINUOUS: ICD-10-PCS | Performed by: INTERNAL MEDICINE

## 2018-01-01 PROCEDURE — 82330 ASSAY OF CALCIUM: CPT | Performed by: STUDENT IN AN ORGANIZED HEALTH CARE EDUCATION/TRAINING PROGRAM

## 2018-01-01 PROCEDURE — 85520 HEPARIN ASSAY: CPT | Performed by: INTERNAL MEDICINE

## 2018-01-01 RX ORDER — NITROGLYCERIN 0.4 MG/1
0.4 TABLET SUBLINGUAL EVERY 5 MIN PRN
Status: DISCONTINUED | OUTPATIENT
Start: 2018-01-01 | End: 2018-01-01 | Stop reason: HOSPADM

## 2018-01-01 RX ORDER — ACETAMINOPHEN 650 MG/1
650 SUPPOSITORY RECTAL EVERY 4 HOURS PRN
Status: DISCONTINUED | OUTPATIENT
Start: 2018-01-01 | End: 2019-01-01 | Stop reason: HOSPADM

## 2018-01-01 RX ORDER — DEXTROSE MONOHYDRATE 25 G/50ML
25-50 INJECTION, SOLUTION INTRAVENOUS
Status: DISCONTINUED | OUTPATIENT
Start: 2018-01-01 | End: 2019-01-01 | Stop reason: HOSPADM

## 2018-01-01 RX ORDER — DEXTROSE MONOHYDRATE 25 G/50ML
INJECTION, SOLUTION INTRAVENOUS
Status: COMPLETED
Start: 2018-01-01 | End: 2018-01-01

## 2018-01-01 RX ORDER — ALBUMIN, HUMAN INJ 5% 5 %
SOLUTION INTRAVENOUS
Status: COMPLETED
Start: 2018-01-01 | End: 2018-01-01

## 2018-01-01 RX ORDER — POTASSIUM CHLORIDE 7.45 MG/ML
10 INJECTION INTRAVENOUS
Status: DISCONTINUED | OUTPATIENT
Start: 2018-01-01 | End: 2019-01-01

## 2018-01-01 RX ORDER — NITROGLYCERIN 0.4 MG/1
0.4 TABLET SUBLINGUAL EVERY 5 MIN PRN
Status: DISCONTINUED | OUTPATIENT
Start: 2018-01-01 | End: 2019-01-01 | Stop reason: HOSPADM

## 2018-01-01 RX ORDER — POTASSIUM CHLORIDE 750 MG/1
20-40 TABLET, EXTENDED RELEASE ORAL
Status: DISCONTINUED | OUTPATIENT
Start: 2018-01-01 | End: 2019-01-01

## 2018-01-01 RX ORDER — DEXTROSE MONOHYDRATE 25 G/50ML
25-50 INJECTION, SOLUTION INTRAVENOUS
Status: DISCONTINUED | OUTPATIENT
Start: 2018-01-01 | End: 2019-01-01

## 2018-01-01 RX ORDER — HEPARIN SODIUM 10000 [USP'U]/100ML
0-3500 INJECTION, SOLUTION INTRAVENOUS CONTINUOUS
Status: DISCONTINUED | OUTPATIENT
Start: 2018-01-01 | End: 2019-01-01

## 2018-01-01 RX ORDER — ACETAMINOPHEN 325 MG/1
650 TABLET ORAL EVERY 4 HOURS PRN
Status: DISCONTINUED | OUTPATIENT
Start: 2018-01-01 | End: 2019-01-01 | Stop reason: HOSPADM

## 2018-01-01 RX ORDER — DOCUSATE SODIUM 100 MG/1
100 CAPSULE, LIQUID FILLED ORAL 2 TIMES DAILY
Status: DISCONTINUED | OUTPATIENT
Start: 2018-01-01 | End: 2018-01-01

## 2018-01-01 RX ORDER — LIDOCAINE 40 MG/G
CREAM TOPICAL
Status: DISCONTINUED | OUTPATIENT
Start: 2018-01-01 | End: 2019-01-01 | Stop reason: HOSPADM

## 2018-01-01 RX ORDER — FUROSEMIDE 10 MG/ML
20 INJECTION INTRAMUSCULAR; INTRAVENOUS ONCE
Status: COMPLETED | OUTPATIENT
Start: 2018-01-01 | End: 2018-01-01

## 2018-01-01 RX ORDER — PHYTONADIONE 5 MG/1
5 TABLET ORAL ONCE
Status: DISCONTINUED | OUTPATIENT
Start: 2018-01-01 | End: 2018-01-01

## 2018-01-01 RX ORDER — DOPAMINE HYDROCHLORIDE 160 MG/100ML
2-5 INJECTION, SOLUTION INTRAVENOUS CONTINUOUS
Status: DISCONTINUED | OUTPATIENT
Start: 2018-01-01 | End: 2018-01-01

## 2018-01-01 RX ORDER — NICOTINE POLACRILEX 4 MG
15-30 LOZENGE BUCCAL
Status: DISCONTINUED | OUTPATIENT
Start: 2018-01-01 | End: 2019-01-01

## 2018-01-01 RX ORDER — SODIUM CHLORIDE 9 MG/ML
INJECTION, SOLUTION INTRAVENOUS CONTINUOUS
Status: DISCONTINUED | OUTPATIENT
Start: 2018-01-01 | End: 2018-01-01 | Stop reason: HOSPADM

## 2018-01-01 RX ORDER — ALUMINA, MAGNESIA, AND SIMETHICONE 2400; 2400; 240 MG/30ML; MG/30ML; MG/30ML
30 SUSPENSION ORAL EVERY 4 HOURS PRN
Status: DISCONTINUED | OUTPATIENT
Start: 2018-01-01 | End: 2019-01-01 | Stop reason: HOSPADM

## 2018-01-01 RX ORDER — LIDOCAINE HYDROCHLORIDE 10 MG/ML
INJECTION, SOLUTION EPIDURAL; INFILTRATION; INTRACAUDAL; PERINEURAL
Status: DISCONTINUED | OUTPATIENT
Start: 2018-01-01 | End: 2018-01-01 | Stop reason: HOSPADM

## 2018-01-01 RX ORDER — POTASSIUM CHLORIDE 29.8 MG/ML
20 INJECTION INTRAVENOUS
Status: DISCONTINUED | OUTPATIENT
Start: 2018-01-01 | End: 2019-01-01

## 2018-01-01 RX ORDER — LIDOCAINE HYDROCHLORIDE ANHYDROUS AND DEXTROSE MONOHYDRATE .8; 5 G/100ML; G/100ML
1-2 INJECTION, SOLUTION INTRAVENOUS CONTINUOUS
Status: DISCONTINUED | OUTPATIENT
Start: 2018-01-01 | End: 2018-01-01

## 2018-01-01 RX ORDER — POTASSIUM CHLORIDE 1.5 G/1.58G
20-40 POWDER, FOR SOLUTION ORAL
Status: DISCONTINUED | OUTPATIENT
Start: 2018-01-01 | End: 2019-01-01

## 2018-01-01 RX ORDER — POTASSIUM CL/LIDO/0.9 % NACL 10MEQ/0.1L
10 INTRAVENOUS SOLUTION, PIGGYBACK (ML) INTRAVENOUS
Status: DISCONTINUED | OUTPATIENT
Start: 2018-01-01 | End: 2019-01-01

## 2018-01-01 RX ORDER — MAGNESIUM SULFATE HEPTAHYDRATE 40 MG/ML
2 INJECTION, SOLUTION INTRAVENOUS DAILY PRN
Status: DISCONTINUED | OUTPATIENT
Start: 2018-01-01 | End: 2019-01-01

## 2018-01-01 RX ORDER — ASPIRIN 81 MG/1
324 TABLET, CHEWABLE ORAL ONCE
Status: DISCONTINUED | OUTPATIENT
Start: 2018-01-01 | End: 2018-01-01 | Stop reason: HOSPADM

## 2018-01-01 RX ORDER — POLYETHYLENE GLYCOL 3350 17 G/17G
17 POWDER, FOR SOLUTION ORAL DAILY PRN
Status: DISCONTINUED | OUTPATIENT
Start: 2018-01-01 | End: 2019-01-01 | Stop reason: HOSPADM

## 2018-01-01 RX ORDER — MAGNESIUM SULFATE HEPTAHYDRATE 40 MG/ML
4 INJECTION, SOLUTION INTRAVENOUS EVERY 4 HOURS PRN
Status: DISCONTINUED | OUTPATIENT
Start: 2018-01-01 | End: 2019-01-01

## 2018-01-01 RX ORDER — PROPOFOL 10 MG/ML
5-75 INJECTION, EMULSION INTRAVENOUS CONTINUOUS
Status: DISCONTINUED | OUTPATIENT
Start: 2018-01-01 | End: 2018-01-01

## 2018-01-01 RX ORDER — CARVEDILOL 6.25 MG/1
12.5 TABLET ORAL 2 TIMES DAILY WITH MEALS
Status: DISCONTINUED | OUTPATIENT
Start: 2018-01-01 | End: 2018-01-01

## 2018-01-01 RX ORDER — ASPIRIN 81 MG/1
324 TABLET, CHEWABLE ORAL ONCE
Status: DISCONTINUED | OUTPATIENT
Start: 2018-01-01 | End: 2018-01-01

## 2018-01-01 RX ORDER — LISINOPRIL 2.5 MG/1
2.5 TABLET ORAL DAILY
Status: DISCONTINUED | OUTPATIENT
Start: 2018-01-01 | End: 2018-01-01

## 2018-01-01 RX ORDER — ASPIRIN 81 MG/1
81 TABLET, CHEWABLE ORAL DAILY
Status: DISCONTINUED | OUTPATIENT
Start: 2018-01-01 | End: 2019-01-01 | Stop reason: HOSPADM

## 2018-01-01 RX ORDER — ALBUMIN, HUMAN INJ 5% 5 %
12.5 SOLUTION INTRAVENOUS ONCE
Status: COMPLETED | OUTPATIENT
Start: 2018-01-01 | End: 2018-01-01

## 2018-01-01 RX ADMIN — AMIODARONE HYDROCHLORIDE 1 MG/MIN: 50 INJECTION, SOLUTION INTRAVENOUS at 04:51

## 2018-01-01 RX ADMIN — DEXTROSE MONOHYDRATE 50 ML: 500 INJECTION PARENTERAL at 23:58

## 2018-01-01 RX ADMIN — POTASSIUM CHLORIDE 20 MEQ: 29.8 INJECTION, SOLUTION INTRAVENOUS at 02:19

## 2018-01-01 RX ADMIN — ASPIRIN 81 MG CHEWABLE TABLET 81 MG: 81 TABLET CHEWABLE at 08:13

## 2018-01-01 RX ADMIN — AMIODARONE HYDROCHLORIDE 1 MG/MIN: 50 INJECTION, SOLUTION INTRAVENOUS at 00:40

## 2018-01-01 RX ADMIN — HEPARIN SODIUM 700 UNITS/HR: 10000 INJECTION, SOLUTION INTRAVENOUS at 19:26

## 2018-01-01 RX ADMIN — PANTOPRAZOLE SODIUM 40 MG: 40 TABLET, DELAYED RELEASE ORAL at 11:41

## 2018-01-01 RX ADMIN — PROPOFOL 30 MCG/KG/MIN: 10 INJECTION, EMULSION INTRAVENOUS at 00:54

## 2018-01-01 RX ADMIN — PROPOFOL 30 MCG/KG/MIN: 10 INJECTION, EMULSION INTRAVENOUS at 17:36

## 2018-01-01 RX ADMIN — PROPOFOL 30 MCG/KG/MIN: 10 INJECTION, EMULSION INTRAVENOUS at 20:18

## 2018-01-01 RX ADMIN — ALBUMIN HUMAN 12.5 G: 0.05 INJECTION, SOLUTION INTRAVENOUS at 10:25

## 2018-01-01 RX ADMIN — POTASSIUM CHLORIDE 20 MEQ: 29.8 INJECTION, SOLUTION INTRAVENOUS at 14:21

## 2018-01-01 RX ADMIN — HUMAN ALBUMIN MICROSPHERES AND PERFLUTREN 5 ML: 10; .22 INJECTION, SOLUTION INTRAVENOUS at 11:15

## 2018-01-01 RX ADMIN — PANTOPRAZOLE SODIUM 40 MG: 40 TABLET, DELAYED RELEASE ORAL at 07:52

## 2018-01-01 RX ADMIN — SERTRALINE HYDROCHLORIDE 50 MG: 50 TABLET ORAL at 07:28

## 2018-01-01 RX ADMIN — ACETAMINOPHEN 650 MG: 325 TABLET, FILM COATED ORAL at 07:28

## 2018-01-01 RX ADMIN — POTASSIUM CHLORIDE 20 MEQ: 29.8 INJECTION, SOLUTION INTRAVENOUS at 18:14

## 2018-01-01 RX ADMIN — ASPIRIN 81 MG CHEWABLE TABLET 81 MG: 81 TABLET CHEWABLE at 07:28

## 2018-01-01 RX ADMIN — POTASSIUM CHLORIDE 20 MEQ: 29.8 INJECTION, SOLUTION INTRAVENOUS at 07:52

## 2018-01-01 RX ADMIN — DOPAMINE HYDROCHLORIDE 2.5 MCG/KG/MIN: 160 INJECTION, SOLUTION INTRAVENOUS at 11:09

## 2018-01-01 RX ADMIN — DEXTROSE MONOHYDRATE 50 ML: 25 INJECTION, SOLUTION INTRAVENOUS at 23:58

## 2018-01-01 RX ADMIN — PHYTONADIONE 5 MG: 10 INJECTION, EMULSION INTRAMUSCULAR; INTRAVENOUS; SUBCUTANEOUS at 22:45

## 2018-01-01 RX ADMIN — HEPARIN SODIUM 600 UNITS/HR: 10000 INJECTION, SOLUTION INTRAVENOUS at 00:40

## 2018-01-01 RX ADMIN — AMIODARONE HYDROCHLORIDE 0.5 MG/MIN: 50 INJECTION, SOLUTION INTRAVENOUS at 19:27

## 2018-01-01 RX ADMIN — HEPARIN SODIUM 700 UNITS/HR: 10000 INJECTION, SOLUTION INTRAVENOUS at 05:02

## 2018-01-01 RX ADMIN — POTASSIUM CHLORIDE 20 MEQ: 29.8 INJECTION, SOLUTION INTRAVENOUS at 03:41

## 2018-01-01 RX ADMIN — FUROSEMIDE 20 MG: 10 INJECTION, SOLUTION INTRAMUSCULAR; INTRAVENOUS at 21:00

## 2018-01-01 RX ADMIN — PANTOPRAZOLE SODIUM 40 MG: 40 INJECTION, POWDER, FOR SOLUTION INTRAVENOUS at 23:03

## 2018-01-01 RX ADMIN — VASOPRESSIN 1.5 UNITS/HR: 20 INJECTION INTRAVENOUS at 19:27

## 2018-01-01 RX ADMIN — SODIUM CHLORIDE 50 ML/HR: 900 INJECTION, SOLUTION INTRAVENOUS at 22:45

## 2018-01-01 RX ADMIN — ALBUMIN HUMAN 12.5 G: 50 SOLUTION INTRAVENOUS at 10:25

## 2018-01-01 RX ADMIN — HEPARIN SODIUM 900 UNITS/HR: 10000 INJECTION, SOLUTION INTRAVENOUS at 21:46

## 2018-01-01 RX ADMIN — VASOPRESSIN 1 UNITS/HR: 20 INJECTION INTRAVENOUS at 18:14

## 2018-01-01 RX ADMIN — SERTRALINE HYDROCHLORIDE 50 MG: 50 TABLET ORAL at 08:13

## 2018-01-01 RX ADMIN — PROPOFOL 20 MCG/KG/MIN: 10 INJECTION, EMULSION INTRAVENOUS at 05:10

## 2018-01-01 RX ADMIN — POTASSIUM CHLORIDE 20 MEQ: 29.8 INJECTION, SOLUTION INTRAVENOUS at 05:36

## 2018-01-01 ASSESSMENT — ACTIVITIES OF DAILY LIVING (ADL)
ADLS_ACUITY_SCORE: 17
ADLS_ACUITY_SCORE: 18
ADLS_ACUITY_SCORE: 17
ADLS_ACUITY_SCORE: 18

## 2018-01-01 ASSESSMENT — ENCOUNTER SYMPTOMS
ARTHRALGIAS: 0
CHILLS: 0
EYE REDNESS: 0
DIFFICULTY URINATING: 0
DIZZINESS: 0
COUGH: 1
SHORTNESS OF BREATH: 1
COLOR CHANGE: 0
CONFUSION: 0
HEADACHES: 0
WEAKNESS: 1
LIGHT-HEADEDNESS: 0
FEVER: 0
NECK STIFFNESS: 0
ABDOMINAL PAIN: 0

## 2018-01-01 ASSESSMENT — MIFFLIN-ST. JEOR
SCORE: 941.5
SCORE: 932.5

## 2018-09-08 NOTE — ED AVS SNAPSHOT
Nicolette Tenorio #[id number] (CSN:[id number])  (67 year old F)  (Adm: 18)     [id number]               [facility]: [phone]            Patient Demographics     Patient Name Sex          Age SSN Address Phone    Nicolette Tenorio Female 1950 (67 year old) [id number]   [facility] [id number] [phone] (Home)  [phone] (Mobile)      PCP and Center    Primary Care Provider  Phone Center     Hesham Mendez [phone] [facility]  [address] , \[facility]\""*        Emergency Contact(s)     Name Relation Home Work Mobile    Jon tenorio Spouse   [phone]      Admission Information     Attending Provider Admitting Provider Admission Type Admission Date/Time    Angelo Madden PA-C  Emergency 18    Discharge Date Hospital Service Auth/Cert Status Service Area     Emergency Medicine [facility]    Unit Room/Bed Admission Status        EMERGENCY DEPT ED03/ED03 Admission (Confirmed)       Admission     Complaint    None      Hospital Account     Name Acct ID Class Status Primary Coverage    Nicolette Tenorio [id number] Emergency Open MEDICARE - MEDICARE FOR HB SUPPLEMENT            Guarantor Account (for Hospital Account #[id number])     Name Relation to Pt Service Area Active? Acct Type    Nicolette Tenorio Self FCS Yes Personal/Family    Address Phone          [address]  [place], [place] [address] [phone](H)              Coverage Information (for Hospital Account #[id number])     1. MEDICARE/MEDICARE FOR HB SUPPLEMENT     F/O Payor/Plan Precert #    MEDICARE/MEDICARE FOR HB SUPPLEMENT     Subscriber Subscriber #    Nicolette Tenorio [id number]    Address Phone    [employer] CLAIM[employer]  [address]  [place] [address] [phone]          2. BCBS/BCBS PLATINUM BLUE     F/O Payor/Plan Precert #    BCBS/BCBS PLATINUM BLUE     Subscriber Subscriber #    Nicolette Tenorio  Alethea [id number]    Address Phone    [address] [address]  [place] [address] [phone]                                                      INTERAGENCY TRANSFER FORM - PHYSICIAN ORDERS   9/8/2018                       [facility]: [phone]            Attending Provider: Angelo Madden PA-C     Allergies:  No Known Allergies    Infection:  None   Service:  EMERGENCY ME    Ht:  1.524 m (5')   Wt:  --   Admission Wt:  --    BMI:  --   BSA:  --            ED Clinical Impression     Diagnosis Description Comment Added By Time Added    Acute on chronic combined systolic and diastolic congestive heart failure (H) [I50.43] Acute on chronic combined systolic and diastolic congestive heart failure (H) [I50.43]  Angelo Madden PA-C 9/8/2018 10:58 PM    Gastrointestinal hemorrhage with melena [K92.1] Gastrointestinal hemorrhage with melena [K92.1]  Angelo Madden PA-C 9/8/2018 10:58 PM    Electrolyte abnormality [E87.8] Electrolyte abnormality [E87.8]  Angelo Madden PA-C 9/8/2018 10:59 PM    Elevated troponin [R74.8] Elevated troponin [R74.8]  Angelo Madden PA-C 9/8/2018 10:59 PM      Hospital Problems as of 9/8/2018     None      Non-Hospital Problems as of 9/8/2018     None      Code Status History     This patient does not have a recorded code status. Please follow your organizational policy for patients in this situation.      Current Code Status     This patient does not have a recorded code status. Please follow your organizational policy for patients in this situation.         Medication Review      UNREVIEWED medications. Ask your doctor about these medications        Dose / Directions Comments    calcium-vitamin D 500-125 MG-UNIT Tabs        Dose:  1 tablet   Take 1 tablet by mouth daily   Refills:  0        CARVEDILOL PO        Dose:  12.5 mg   Take 12.5 mg by mouth 2 times daily (with meals)   Refills:  0        COUMADIN PO        Refills:  0        DIGOXIN PO         "Dose:  125 mcg   Take 125 mcg by mouth   Refills:  0        ESCITALOPRAM OXALATE PO        Dose:  5 mg   Take 5 mg by mouth daily   Refills:  0        LASIX PO        Dose:  2.5 mg   Take 2.5 mg by mouth Take with 2lb wt gain, take 2 tabs   Refills:  0        LISINOPRIL PO        Dose:  1.25 mg   Take 1.25 mg by mouth 2 times daily   Refills:  0                                                    INTERAGENCY TRANSFER FORM - NURSING   9/8/2018                       [facility]: [phone]            Attending Provider: Angelo Madden PA-C     Allergies:  No Known Allergies    Infection:  None   Service:  EMERGENCY ME    Ht:  1.524 m (5')   Wt:  --   Admission Wt:  --    BMI:  --   BSA:  --            Advance Directives        Scanned docmt in ACP Activity?           No scanned doc        Immunizations     None      ASSESSMENT     Discharge Profile Flowsheet     GASTROINTESTINAL (ADULT,PEDIATRIC,OB)     COMMUNICATION ASSESSMENT      GI WDL  GI symptoms;stool 09/08/18 2110   Patient's communication style  spoken language (English or Bilingual) 09/08/18 2035    All Quadrants Bowel Sounds  hypoactive 09/08/18 2110   SKIN      All Quadrants Palpation  taut 09/08/18 2110   Skin WDL  color 09/08/18 2112    GI Signs/Symptoms  abdominal fullness 09/08/18 2110   Skin Color/Characteristics  pale 09/08/18 2112                 Assessment WDL (Within Defined Limits) Definitions           Skin WDL     Effective: 09/28/15    Row Information: <b>WDL Definition:</b> Warm; dry; intact; elastic; without discoloration; pressure points without redness<br> <font color=\"gray\"><i>Item=AS skin wdl>>List=AS skin wdl>>Version=F14</i></font>      Vitals     Vital Signs Flowsheet     VITAL SIGNS     0-10 Pain Scale  5 09/08/18 2042    Temp  98  F (36.7  C) 09/08/18 2042   HEIGHT AND WEIGHT      Temp src  Tympanic 09/08/18 2042   Height  1.524 m (5') 09/08/18 2042    Resp  20 09/08/18 2309   Height Method  Stated 09/08/18 " 2042    Pulse  69 09/08/18 2042   EKG MONITORING      Heart Rate  73 09/08/18 2309   Cardiac Regularity  Irregular 09/08/18 2108    Pulse/Heart Rate Source  Monitor 09/08/18 2042   Cardiac Rhythm  A-V Sequential paced 09/08/18 2108    BP  119/61 09/08/18 2309   AZUL COMA SCALE      OXYGEN THERAPY     Best Eye Response  4-->(E4) spontaneous 09/08/18 2042    SpO2  94 % 09/08/18 2309   Best Motor Response  6-->(M6) obeys commands 09/08/18 2042    O2 Device  None (Room air) 09/08/18 2042   Best Verbal Response  5-->(V5) oriented 09/08/18 2042    PAIN/COMFORT     Azul Coma Scale Score  15 09/08/18 2042    Patient's Stated Pain Goal  No pain 09/08/18 2042                 Patient Lines/Drains/Airways Status    Active LINES/DRAINS/AIRWAYS     Name: Placement date: Placement time: Site: Days: Last dressing change:    Peripheral IV 09/08/18 Right 09/08/18 2055      less than 1             Patient Lines/Drains/Airways Status    Active PICC/CVC     None            Intake/Output Detail Report     None      Case Management/Discharge Planning     Case Management/Discharge Planning Flowsheet     ABUSE RISK SCREEN     OBSERVATION: Is there reason to believe there has been maltreatment of a vulnerable adult (ie. Physical/Sexual/Emotional abuse, self neglect, lack of adequate food, shelter, medical care, or financial exploitation)?  no 09/08/18 2054    QUESTION TO PATIENT:  Has a member of your family or a partner(now or in the past) intimidated, hurt, manipulated, or controlled you in any way?  no 09/08/18 2054   HOMICIDE RISK      QUESTION TO PATIENT: Do you feel safe going back to the place where you are living?  yes 09/08/18 2054   Feels Like Hurting Others  no 09/08/18 2054                  [facility]: [phone]            Medication Administration Report for Nicoltete Adan as of 09/08/18 2314   Legend:    Given Hold Not Given Due Canceled Entry Other Actions    Time Time (Time) Time   Time-Action       Inactive    Active    Linked        Medications 18    aspirin chewable tablet 324 mg  Dose: 324 mg  Freq: ONCE Route: PO  Start: 18   Admin. Amount: 4 tablet (4 × 81 mg tablet)  Dispense Loc: EMERGENCY DEPARTMENT  Administrations Remainin2059-Hold [C]           nitroGLYcerin (NITROSTAT) sublingual tablet 0.4 mg  Dose: 0.4 mg  Freq: EVERY 5 MIN PRN Route: SL  PRN Reason: chest pain  Start: 18   Admin. Amount: 1 tablet (1 × 0.4 mg tablet)  Dispense Loc: EMERGENCY DEPARTMENT                 Dose: 5 mg  Freq: ONCE Route: PO  Start: 18   End: 18   Admin. Amount: 1 tablet (1 × 5 mg tablet)  Administrations Remainin                  -Med Discontinued       sodium chloride 0.9% infusion  Rate: 50 mL/hr   Freq: CONTINUOUS Route: IV  Last Dose: Stopped (18)  Start: 18   Last Admin: 18  Dispense Loc: EMERGENCY DEPARTMENT  Volume: 1,000 mL   Current Line: Peripheral IV 18 Right          2245 (50 mL/hr)-New Bag       2308-ED Infusing on Admission/transfer          Completed Medications  Medications 18         Dose: 20 mg  Freq: ONCE Route: IV  Start: 18   End: 18   Admin Instructions: For ordered doses up to 40 mg, give IV Push undiluted over 1-2 minutes.    Admin. Amount: 20 mg = 2 mL Conc: 10 mg/mL  Last Admin: 18  Dispense Loc: EMERGENCY DEPARTMENT  Infused Over: 1-2 Minutes  Administrations Remainin  Volume: 2 mL   Current Line: Peripheral IV 18 Right          2100 (20 mg)-Given             Dose: 40 mg  Freq: ONCE Route: IV  Start: 18   End: 18   Admin Instructions: Irritant. For ordered doses up to 40 mg, give IV Push over 2 minutes when reconstituted with 10mL NS.    Admin. Amount: 40 mg  Last Admin: 18  2303  Dispense Loc: EMERGENCY DEPARTMENT  Administrations Remainin  Volume: 10 mL   Current Line: Peripheral IV 18 Right          2303 (40 mg)-Given             Dose: 5 mg  Freq: ONCE Route: IV  Last Dose: Stopped (18)  Start: 18   End: 18   Admin Instructions: Protect from light.    Admin. Amount: 5 mg  Last Admin: 18  Dispense Loc:  MAIN PHARMACY  Infused Over: 30 Minutes  Administrations Remainin  Volume: 50 mL   Mixture Administration Information:   Medication Type Amount   phytonadione 10 MG/ML SOLN Medications 5 mg   sodium chloride 0.9 % SOLN Base 50 mL           Current Line: Peripheral IV 18 Right          2240 (5 mg)-Handoff [C]       2245 (5 mg)-New Bag       -ED Infusing on Admission/transfer                    INTERAGENCY TRANSFER FORM - NOTES (H&P, Discharge Summary, Consults, Procedures, Therapies)   2018                       [facility]: [phone]            History & Physicals     No notes of this type exist for this encounter.      Discharge Summaries     No notes of this type exist for this encounter.      Consult Notes     No notes of this type exist for this encounter.         Progress Notes - Physician (Notes for yesterday and today)      ED Provider Notes by Angelo Madden PA-C at 2018  8:32 PM     Author:  Angelo Madden PA-C Service:  Emergency Medicine Author Type:  Physician Assistant - C    Filed:  2018 11:07 PM Date of Service:  2018  8:32 PM Creation Time:  2018  8:50 PM    Status:  Signed :  Angelo Madden PA-C (Physician Assistant - C)           History     Chief Complaint   Patient presents with     Shortness of Breath     HPI Comments: This is a 67-year-old female who is up here visiting and watching the automobile races [place] [employer].  She resides in [facility].  She does report she has a history of CHF and takes Lasix but this was decreased  last week.  Furthermore she has been having some burning and chest pain.  She also reports she has been having some abdominal bloating and firmness at times with some GI upset.  She has been having some dark tarry stools.  She appears pale in color.  Somewhat anxious.  No tachypnea or respiratory distress.  She is on Coumadin therapy for chronic atrial fibrillation.    The history is provided by the patient and the spouse.       Problem List:    There are no active problems to display for this patient.       Past Medical History:    No past medical history on file.    Past Surgical History:    No past surgical history on file.    Family History:    No family history on file.    Social History:  Marital Status:   [2]  Social History   Substance Use Topics     Smoking status: Not on file     Smokeless tobacco: Not on file     Alcohol use Not on file        Medications:      No current outpatient prescriptions on file.      Review of Systems   Constitutional: Negative for chills and fever.   HENT: Negative for congestion.    Eyes: Negative for redness.   Respiratory: Positive for cough and shortness of breath.    Cardiovascular: Negative for chest pain.   Gastrointestinal: Negative for abdominal pain.   Genitourinary: Negative for difficulty urinating.   Musculoskeletal: Negative for arthralgias and neck stiffness.   Skin: Positive for pallor. Negative for color change.   Neurological: Positive for weakness. Negative for dizziness, light-headedness and headaches.   Psychiatric/Behavioral: Negative for confusion.       Physical Exam   BP: 117/53  Pulse: 69  Temp: 98  F (36.7  C)  Resp: 19  Height: 152.4 cm (5')  SpO2: 96 %      Physical Exam   Constitutional: She is oriented to person, place, and time. No distress.   HENT:   Head: Atraumatic.   Mouth/Throat: Oropharynx is clear and moist. No oropharyngeal exudate.   Eyes: Pupils are equal, round, and reactive to light. No scleral icterus.   Cardiovascular: Normal  heart sounds and intact distal pulses.    Pulmonary/Chest: Breath sounds normal. No respiratory distress.   Lung sounds clear but decreased throughout.  SaO2 is 96% on room air.   Abdominal: Soft. Bowel sounds are normal. There is no tenderness.   Musculoskeletal: She exhibits no edema or tenderness.   Neurological: She is alert and oriented to person, place, and time.   Skin: Skin is warm. No rash noted. She is not diaphoretic.       ED Course     ED Course     Procedures               EKG Interpretation:      Interpreted by Angelo Francis  Time reviewed: 2050  Symptoms at time of EKG: SOC, Chest pain and tarry stools   Rhythm: bigeminy  Rate: Normal  Axis: Normal  Ectopy: bigeminy  Conduction: Possible right ventricular hypertrophy.  Left ventricular hypertrophy with QRS widening and repolarization abnormality.  lateral infarct age undetermined.  ST Segments/ T Waves: Non-specific ST-T wave changes  Q Waves: none  Comparison to prior: No old EKG available    Clinical Impression: non-specific EKG[MR1.1]                   Results for orders placed or performed during the hospital encounter of 09/08/18 (from the past 24 hour(s))   CBC with platelets differential   Result Value Ref Range    WBC 6.2 4.0 - 11.0 10e9/L    RBC Count 3.20 (L) 3.8 - 5.2 10e12/L    Hemoglobin 9.5 (L) 11.7 - 15.7 g/dL    Hematocrit 29.6 (L) 35.0 - 47.0 %    MCV 93 78 - 100 fl    MCH 29.7 26.5 - 33.0 pg    MCHC 32.1 31.5 - 36.5 g/dL    RDW 14.0 10.0 - 15.0 %    Platelet Count 210 150 - 450 10e9/L    Diff Method Automated Method     % Neutrophils 76.7 %    % Lymphocytes 13.0 %    % Monocytes 8.5 %    % Eosinophils 0.5 %    % Basophils 0.3 %    % Immature Granulocytes 1.0 %    Absolute Neutrophil 4.8 1.6 - 8.3 10e9/L    Absolute Lymphocytes 0.8 0.8 - 5.3 10e9/L    Absolute Monocytes 0.5 0.0 - 1.3 10e9/L    Absolute Eosinophils 0.0 0.0 - 0.7 10e9/L    Absolute Basophils 0.0 0.0 - 0.2 10e9/L    Abs Immature Granulocytes 0.1 0 - 0.4 10e9/L    Comprehensive metabolic panel   Result Value Ref Range    Sodium 127 (L) 134 - 144 mmol/L    Potassium 5.2 (H) 3.5 - 5.1 mmol/L    Chloride 94 (L) 98 - 107 mmol/L    Carbon Dioxide 24 21 - 31 mmol/L    Anion Gap 9 3 - 14 mmol/L    Glucose 149 (H) 70 - 105 mg/dL    Urea Nitrogen 36 (H) 7 - 25 mg/dL    Creatinine 1.42 (H) 0.60 - 1.20 mg/dL    GFR Estimate 37 (L) >60 mL/min/1.7m2    GFR Estimate If Black 45 (L) >60 mL/min/1.7m2    Calcium 8.9 8.6 - 10.3 mg/dL    Bilirubin Total 1.1 (H) 0.3 - 1.0 mg/dL    Albumin 4.2 3.5 - 5.7 g/dL    Protein Total 6.9 6.4 - 8.9 g/dL    Alkaline Phosphatase 55 34 - 104 U/L    ALT 20 7 - 52 U/L    AST 21 13 - 39 U/L   INR   Result Value Ref Range    INR 5.34 (HH) 0 - 1.3   Troponin I   Result Value Ref Range    Troponin I ES 0.037 (H) 0.000 - 0.034 ug/L   Lactic acid   Result Value Ref Range    Lactic Acid 1.2 0.5 - 2.2 mmol/L   Nt probnp inpatient (BNP)   Result Value Ref Range    N-Terminal Pro BNP Inpatient 861 (H) 0 - 100 pg/mL   ABO/Rh type and screen   Result Value Ref Range    ABO O     RH(D) Pos     Antibody Screen Neg     Test Valid Only At [facility]        Specimen Expires 09/11/2018    XR Chest Port 1 View    Narrative    PROCEDURE:  XR CHEST PORT 1 VW    HISTORY:  sob; .     COMPARISON:  None.    FINDINGS:   There is massive cardiomegaly. Is an aortic valvular prosthesis in  place. There is a transvenous pacemaker in place. There is  cephalization of pulmonary vascularity suggesting elevated pulmonary  vein pressures. Compressive atelectasis is seen at the lung bases.  There is mild blunting of the right costophrenic angle.      Impression    IMPRESSION:  Massive cardiomegaly      DOYLE CEJA MD   Troponin I (second draw)   Result Value Ref Range    Troponin I ES 0.040 (H) 0.000 - 0.034 ug/L[MR1.2]       Medications   sodium chloride 0.9% infusion (not administered)   aspirin chewable tablet 324 mg (not administered)   nitroGLYcerin (NITROSTAT)  sublingual tablet 0.4 mg (not administered)   furosemide (LASIX) injection 20 mg (not administered)       Assessments & Plan (with Medical Decision Making)     I have reviewed the nursing notes.    I have reviewed the findings, diagnosis, plan and need for follow up with the patient.      New Prescriptions    No medications on file[MR1.1]       Final diagnoses:   Acute on chronic combined systolic and diastolic congestive heart failure (H)   Gastrointestinal hemorrhage with melena   Electrolyte abnormality   Elevated troponin[MR1.2]   Afebrile.  Vital signs stable.  Pale skin color with increased shortness of breath history of CHF.  Some black tarry stools recently.  Lab attempted ABG but was unsuccessful ×2 and patient refused a third attempt.  IV established and she was given Lasix 20 IV initially.  She is also given Protonix 40 mg IV.  EKG shows a bigeminal wide QRS rhythm with frequent ventricular paced complexes right ventricular hypertrophy left ventricular hypertrophy with QRS widening and repolarization abnormality.  Lateral infarct, age undetermined.  Initial troponin is elevated at 0.037.  BNP returns elevated at 861.  Did type and screen her and she is O+ body negative.  CBC shows normal white blood cells and no left shift.  Her hemoglobin is 9.5.  No labs for comparison.  CMP returns with multiple electrolyte abnormalities her sodium is decreased at 127, potassium is elevated at 5.2, BUN is 36 and creatinine is 1.42 with a GFR of 37.  INR returns elevated at 5.34.  She was given 5 mg of vitamin K IV.  Reports that she is feeling much better after the above treatment.  However her 90 minute troponin returns and is elevated at 0.040.  Long discussion with the patient and her spouse in my concerns of her multiple issues and further evaluation.   They are both in agreement that she needs to be hospitalized at this point.  I discussed with the doctor who is seen at [facility].  And she will be transferred at  this time ground ambulance.  She will be admitted to 6 E.  9/8/2018   [facility][MR1.1]     Angelo Madden PA-C  09/08/18 2307  [MR1.3]     Revision History        User Key Date/Time User Provider Type Action    > MR1.3 9/8/2018 11:07 PM Angelo Madden PA-C Physician Assistant - MARGARITA Sign     MR1.2 9/8/2018 11:02 PM Angelo Madden PA-C Physician Assistant - C      MR1.1 9/8/2018  8:50 PM Angelo Madden PA-C Physician Assistant - MARGARITA                   Procedure Notes     No notes of this type exist for this encounter.      Progress Notes - Therapies (Notes from 09/05/18 through 09/08/18)     No notes of this type exist for this encounter.                                          INTERAGENCY TRANSFER FORM - LAB / IMAGING / EKG / EMG RESULTS   9/8/2018                       [facility]: [phone]            Unresulted Labs     None         Lab Results - 3 Days      Troponin I (second draw) [[id number]] (Abnormal)  Resulted: 09/08/18 2243, Result status: Final result    Ordering provider: Angelo Madden PA-C  09/08/18 2144 Resulting lab: [facility]    Specimen Information    Type Source Collected On   Blood  09/08/18 2220          Components       Value Reference Range Flag Lab   Troponin I ES 0.040 0.000 - 0.034 ug/L H [facility]            INR [[id number]] (Abnormal)  Resulted: 09/08/18 2156, Result status: Final result    Ordering provider: Angelo Madden PA-C  09/08/18 2046 Resulting lab: [facility]    Specimen Information    Type Source Collected On   Blood  09/08/18 2100          Components       Value Reference Range Flag Lab   INR 5.34 0 - 1.3 [facility]   Comment:         Critical Value called to and read back by  DR DUONG,09.08.2018,2155,KS              Comprehensive metabolic panel [[id number]] (Abnormal)  Resulted: 09/08/18 2153, Result status: Final result    Ordering provider: Angelo Madden  WADE HAIRSTON  09/08/18 2046 Resulting lab: [facility]    Specimen Information    Type Source Collected On   Blood  09/08/18 2100          Components       Value Reference Range Flag Lab   Sodium 127 134 - 144 mmol/L L [facility]   Potassium 5.2 3.5 - 5.1 mmol/L H [facility]   Chloride 94 98 - 107 mmol/L L [facility]   Carbon Dioxide 24 21 - 31 mmol/L  [facility]   Anion Gap 9 3 - 14 mmol/L  [facility]   Glucose 149 70 - 105 mg/dL H [facility]   Urea Nitrogen 36 7 - 25 mg/dL H [facility]   Creatinine 1.42 0.60 - 1.20 mg/dL H [facility]   GFR Estimate 37 >60 mL/min/1.7m2 L [facility]   GFR Estimate If Black 45 >60 mL/min/1.7m2 L [facility]   Calcium 8.9 8.6 - 10.3 mg/dL  [facility]   Bilirubin Total 1.1 0.3 - 1.0 mg/dL H [facility]   Albumin 4.2 3.5 - 5.7 g/dL  [facility]   Protein Total 6.9 6.4 - 8.9 g/dL  [facility]   Alkaline Phosphatase 55 34 - 104 U/L  [facility]   ALT 20 7 - 52 U/L  [facility]   AST 21 13 - 39 U/L  [facility]            Nt probnp inpatient (BNP) [[id number]] (Abnormal)  Resulted: 09/08/18 2139, Result status: Final result    Ordering provider: Angelo Madden PA-C  09/08/18 2046 Resulting lab: [facility]    Specimen Information    Type Source Collected On   Blood  09/08/18 2100          Components       Value Reference Range Flag Lab   N-Terminal Pro BNP Inpatient 861 0 - 100 pg/mL H [facility]   Comment:            Reference range shown and results flagged as abnormal are suggested inpatient   cut points for confirming diagnosis if CHF in an acute setting. Establishing a   baseline value for each individual patient is useful for follow-up. An   inpatient or emergency department NT-proPBNP <300 pg/mL effectively rules out   acute CHF, with 99% negative predictive value.  The outpatient non-acute reference range for ruling out CHF is:   0-125 pg/mL (age 18 to less than 75)   0-450 pg/mL (age 75 yrs and older)              Troponin I [[id number]] (Abnormal)   Resulted: 09/08/18 2137, Result status: Final result    Ordering provider: Angelo Madden PA-C  09/08/18 2046 Resulting lab: [facility]    Specimen Information    Type Source Collected On   Blood  09/08/18 2100          Components       Value Reference Range Flag Lab   Troponin I ES 0.037 0.000 - 0.034 ug/L [facility]            Lactic acid [[id number]]  Resulted: 09/08/18 2131, Result status: Final result    Ordering provider: Angelo Madden PA-C  09/08/18 2046 Resulting lab: [facility]    Specimen Information    Type Source Collected On   Blood  09/08/18 2100          Components       Value Reference Range Flag Lab   Lactic Acid 1.2 0.5 - 2.2 mmol/L  [facility]            CBC with platelets differential [[id number]] (Abnormal)  Resulted: 09/08/18 2120, Result status: Final result    Ordering provider: Angelo Madden PA-C  09/08/18 2046 Resulting lab: [facility]    Specimen Information    Type Source Collected On   Blood  09/08/18 2100          Components       Value Reference Range Flag Lab   WBC 6.2 4.0 - 11.0 10e9/L  [facility]   RBC Count 3.20 3.8 - 5.2 10e12/L L [facility]   Hemoglobin 9.5 11.7 - 15.7 g/dL L [facility]   Hematocrit 29.6 35.0 - 47.0 % L [facility]   MCV 93 78 - 100 fl  [facility]   MCH 29.7 26.5 - 33.0 pg  [facility]   MCHC 32.1 31.5 - 36.5 g/dL  [facility]   RDW 14.0 10.0 - 15.0 %  [facility]   Platelet Count 210 150 - 450 10e9/L  [facility]   Diff Method Automated Method   [facility]   % Neutrophils 76.7 %  [facility]   % Lymphocytes 13.0 %  [facility]   % Monocytes 8.5 %  [facility]   % Eosinophils 0.5 %  [facility]   % Basophils 0.3 %  [facility]   % Immature Granulocytes 1.0 %  [facility]   Absolute Neutrophil 4.8 1.6 - 8.3 10e9/L  [facility]   Absolute Lymphocytes 0.8 0.8 - 5.3 10e9/L  [facility]   Absolute Monocytes 0.5 0.0 - 1.3 10e9/L  [facility]   Absolute Eosinophils 0.0 0.0 - 0.7 10e9/L  [facility]    Absolute Basophils 0.0 0.0 - 0.2 10e9/L  [facility]   Abs Immature Granulocytes 0.1 0 - 0.4 10e9/L  [facility]            Testing Performed By     Lab - Abbreviation Name Director Address Valid Date Range    1743 - [facility] AND [facility] Unknown [address]  [place] [address] 08/07/15 0948 - Present               Imaging Results - 3 Days      XR Chest Port 1 View [[id number]]  Resulted: 09/08/18 2203, Result status: Final result    Ordering provider: Angelo Madden PA-C  09/08/18 2046 Resulted by: Edgar Ceja MD    Performed: 09/08/18 2146 - 09/08/18 2151 Resulting lab: RADIOLOGY RESULTS    Narrative:       PROCEDURE:  XR CHEST PORT 1 VW    HISTORY:  sob; .     COMPARISON:  None.    FINDINGS:   There is massive cardiomegaly. Is an aortic valvular prosthesis in  place. There is a transvenous pacemaker in place. There is  cephalization of pulmonary vascularity suggesting elevated pulmonary  vein pressures. Compressive atelectasis is seen at the lung bases.  There is mild blunting of the right costophrenic angle.      Impression:       IMPRESSION:  Massive cardiomegaly      EDGAR CEJA MD      Testing Performed By     Lab - Abbreviation Name Director Address Valid Date Range    104 - [facility] RADIOLOGY RESULTS Unknown Unknown 02/16/05 1553 - Present            Encounter-Level Documents:     There are no encounter-level documents.      Order-Level Documents:     There are no order-level documents.

## 2018-09-09 NOTE — ED PROVIDER NOTES
History     Chief Complaint   Patient presents with     Shortness of Breath     HPI Comments: This is a 67-year-old female who is up here visiting and watching the automobile races the [employer].  She resides in [facility].  She does report she has a history of CHF and takes Lasix but this was decreased last week.  Furthermore she has been having some burning and chest pain.  She also reports she has been having some abdominal bloating and firmness at times with some GI upset.  She has been having some dark tarry stools.  She appears pale in color.  Somewhat anxious.  No tachypnea or respiratory distress.  She is on Coumadin therapy for chronic atrial fibrillation.    The history is provided by the patient and the spouse.       Problem List:    There are no active problems to display for this patient.       Past Medical History:    No past medical history on file.    Past Surgical History:    No past surgical history on file.    Family History:    No family history on file.    Social History:  Marital Status:   [2]  Social History   Substance Use Topics     Smoking status: Not on file     Smokeless tobacco: Not on file     Alcohol use Not on file        Medications:      No current outpatient prescriptions on file.      Review of Systems   Constitutional: Negative for chills and fever.   HENT: Negative for congestion.    Eyes: Negative for redness.   Respiratory: Positive for cough and shortness of breath.    Cardiovascular: Negative for chest pain.   Gastrointestinal: Negative for abdominal pain.   Genitourinary: Negative for difficulty urinating.   Musculoskeletal: Negative for arthralgias and neck stiffness.   Skin: Positive for pallor. Negative for color change.   Neurological: Positive for weakness. Negative for dizziness, light-headedness and headaches.   Psychiatric/Behavioral: Negative for confusion.       Physical Exam   BP: 117/53  Pulse: 69  Temp: 98  F (36.7  C)  Resp: 19  Height: 152.4 cm  (5')  SpO2: 96 %      Physical Exam   Constitutional: She is oriented to person, place, and time. No distress.   HENT:   Head: Atraumatic.   Mouth/Throat: Oropharynx is clear and moist. No oropharyngeal exudate.   Eyes: Pupils are equal, round, and reactive to light. No scleral icterus.   Cardiovascular: Normal heart sounds and intact distal pulses.    Pulmonary/Chest: Breath sounds normal. No respiratory distress.   Lung sounds clear but decreased throughout.  SaO2 is 96% on room air.   Abdominal: Soft. Bowel sounds are normal. There is no tenderness.   Musculoskeletal: She exhibits no edema or tenderness.   Neurological: She is alert and oriented to person, place, and time.   Skin: Skin is warm. No rash noted. She is not diaphoretic.       ED Course     ED Course     Procedures               EKG Interpretation:      Interpreted by Angelo Francis  Time reviewed: 2050  Symptoms at time of EKG: SOC, Chest pain and tarry stools   Rhythm: bigeminy  Rate: Normal  Axis: Normal  Ectopy: bigeminy  Conduction: Possible right ventricular hypertrophy.  Left ventricular hypertrophy with QRS widening and repolarization abnormality.  lateral infarct age undetermined.  ST Segments/ T Waves: Non-specific ST-T wave changes  Q Waves: none  Comparison to prior: No old EKG available    Clinical Impression: non-specific EKG                   Results for orders placed or performed during the hospital encounter of 09/08/18 (from the past 24 hour(s))   CBC with platelets differential   Result Value Ref Range    WBC 6.2 4.0 - 11.0 10e9/L    RBC Count 3.20 (L) 3.8 - 5.2 10e12/L    Hemoglobin 9.5 (L) 11.7 - 15.7 g/dL    Hematocrit 29.6 (L) 35.0 - 47.0 %    MCV 93 78 - 100 fl    MCH 29.7 26.5 - 33.0 pg    MCHC 32.1 31.5 - 36.5 g/dL    RDW 14.0 10.0 - 15.0 %    Platelet Count 210 150 - 450 10e9/L    Diff Method Automated Method     % Neutrophils 76.7 %    % Lymphocytes 13.0 %    % Monocytes 8.5 %    % Eosinophils 0.5 %    % Basophils 0.3 %     % Immature Granulocytes 1.0 %    Absolute Neutrophil 4.8 1.6 - 8.3 10e9/L    Absolute Lymphocytes 0.8 0.8 - 5.3 10e9/L    Absolute Monocytes 0.5 0.0 - 1.3 10e9/L    Absolute Eosinophils 0.0 0.0 - 0.7 10e9/L    Absolute Basophils 0.0 0.0 - 0.2 10e9/L    Abs Immature Granulocytes 0.1 0 - 0.4 10e9/L   Comprehensive metabolic panel   Result Value Ref Range    Sodium 127 (L) 134 - 144 mmol/L    Potassium 5.2 (H) 3.5 - 5.1 mmol/L    Chloride 94 (L) 98 - 107 mmol/L    Carbon Dioxide 24 21 - 31 mmol/L    Anion Gap 9 3 - 14 mmol/L    Glucose 149 (H) 70 - 105 mg/dL    Urea Nitrogen 36 (H) 7 - 25 mg/dL    Creatinine 1.42 (H) 0.60 - 1.20 mg/dL    GFR Estimate 37 (L) >60 mL/min/1.7m2    GFR Estimate If Black 45 (L) >60 mL/min/1.7m2    Calcium 8.9 8.6 - 10.3 mg/dL    Bilirubin Total 1.1 (H) 0.3 - 1.0 mg/dL    Albumin 4.2 3.5 - 5.7 g/dL    Protein Total 6.9 6.4 - 8.9 g/dL    Alkaline Phosphatase 55 34 - 104 U/L    ALT 20 7 - 52 U/L    AST 21 13 - 39 U/L   INR   Result Value Ref Range    INR 5.34 (HH) 0 - 1.3   Troponin I   Result Value Ref Range    Troponin I ES 0.037 (H) 0.000 - 0.034 ug/L   Lactic acid   Result Value Ref Range    Lactic Acid 1.2 0.5 - 2.2 mmol/L   Nt probnp inpatient (BNP)   Result Value Ref Range    N-Terminal Pro BNP Inpatient 861 (H) 0 - 100 pg/mL   ABO/Rh type and screen   Result Value Ref Range    ABO O     RH(D) Pos     Antibody Screen Neg     Test Valid Only At [facility]        Specimen Expires 09/11/2018    XR Chest Port 1 View    Narrative    PROCEDURE:  XR CHEST PORT 1 VW    HISTORY:  sob; .     COMPARISON:  None.    FINDINGS:   There is massive cardiomegaly. Is an aortic valvular prosthesis in  place. There is a transvenous pacemaker in place. There is  cephalization of pulmonary vascularity suggesting elevated pulmonary  vein pressures. Compressive atelectasis is seen at the lung bases.  There is mild blunting of the right costophrenic angle.      Impression    IMPRESSION:   Massive cardiomegaly      DOYLE CEJA MD   Troponin I (second draw)   Result Value Ref Range    Troponin I ES 0.040 (H) 0.000 - 0.034 ug/L       Medications   sodium chloride 0.9% infusion (not administered)   aspirin chewable tablet 324 mg (not administered)   nitroGLYcerin (NITROSTAT) sublingual tablet 0.4 mg (not administered)   furosemide (LASIX) injection 20 mg (not administered)       Assessments & Plan (with Medical Decision Making)     I have reviewed the nursing notes.    I have reviewed the findings, diagnosis, plan and need for follow up with the patient.      New Prescriptions    No medications on file       Final diagnoses:   Acute on chronic combined systolic and diastolic congestive heart failure (H)   Gastrointestinal hemorrhage with melena   Electrolyte abnormality   Elevated troponin   Afebrile.  Vital signs stable.  Pale skin color with increased shortness of breath history of CHF.  Some black tarry stools recently.  Lab attempted ABG but was unsuccessful ×2 and patient refused a third attempt.  IV established and she was given Lasix 20 IV initially.  She is also given Protonix 40 mg IV.  EKG shows a bigeminal wide QRS rhythm with frequent ventricular paced complexes right ventricular hypertrophy left ventricular hypertrophy with QRS widening and repolarization abnormality.  Lateral infarct, age undetermined.  Initial troponin is elevated at 0.037.  BNP returns elevated at 861.  Did type and screen her and she is O+ body negative.  CBC shows normal white blood cells and no left shift.  Her hemoglobin is 9.5.  No labs for comparison.  CMP returns with multiple electrolyte abnormalities her sodium is decreased at 127, potassium is elevated at 5.2, BUN is 36 and creatinine is 1.42 with a GFR of 37.  INR returns elevated at 5.34.  She was given 5 mg of vitamin K IV.  Reports that she is feeling much better after the above treatment.  However her 90 minute troponin returns and is elevated at  0.040.  Long discussion with the patient and her spouse in my concerns of her multiple issues and further evaluation.   They are both in agreement that she needs to be hospitalized at this point.  I discussed with the doctor who is seen at [facility].  And she will be transferred at this time ground ambulance.  She will be admitted to [facility].  9/8/2018   [facility] AND \[facility]\""     Angelo Madden PA-C  09/08/18 8397

## 2018-09-09 NOTE — ED NOTES
had difficulty drawing ABG's and pt is refusing for her to attempt draw again, PA aware.  Pt also informed me that she had a Cardiac Arrest a few years ago, at that time the AICD/Pacer was inserted and is due for a battery change this Tuesday.

## 2018-09-09 NOTE — ED NOTES
Pt to transfer to [facility] in [place], [facility] notified for transport, will send Mchenry to transport.

## 2018-09-09 NOTE — ED TRIAGE NOTES
ED Nursing Triage Note (General)   ________________________________    Nicolette Adan is a 67 year old Female that presents to triage private car  With history of  Has a hx of CHF, lasix was decreased last week and has been having burning and CP with abdominal bloating an firmness for about a week, reported by patient   Significant symptoms had onset 1 week(s) ago.  There were no vitals taken for this visit.t  Patient appears alert  and oriented, in severe distress., and cooperative behavior.  Anxiety: due to SOB  GCS:  15  Airway: intact  Breathing noted as SOB with CP and burning.  Circulation Abnormal  Skin pale  Action taken:  Triage to critical care immediately      PRE HOSPITAL PRIOR LIVING SITUATION Spouse    COLUMBIA-SUICIDE SEVERITY RATING SCALE   Screen with Triage Points for Emergency Department      Ask questions that are bolded and underlined.   Past  month   Ask Questions 1 and 2 YES NO   1)  Have you wished you were dead or wished you could go to sleep and not wake up?   x   2)  Have you actually had any thoughts of killing yourself?   x   If YES to 2, ask questions 3, 4, 5, and 6.  If NO to 2, go directly to question 6.   3)  Have you been thinking about how you might do this?   E.g.  I thought about taking an overdose but I never made a specific plan as to when where or how I would actually do it .and I would never go through with it.    x   4)  Have you had these thoughts and had some intention of acting on them?   As opposed to  I have the thoughts but I definitely will not do anything about them.    x   5)  Have you started to work out or worked out the details of how to kill yourself? Do you intend to carry out this plan?   x   6)  Have you ever done anything, started to do anything, or prepared to do anything to end your life?  Examples: Collected pills, obtained a gun, gave away valuables, wrote a will or suicide note, took out pills but didn t swallow any, held a gun but changed your mind  or it was grabbed from your hand, went to the roof but didn t jump; or actually took pills, tried to shoot yourself, cut yourself, tried to hang yourself, etc.    If YES, ask: Was this within the past three months?  Lifetime     x    Past 3 Months        Item 1:  Behavioral Health Referral at Discharge  Item 2:  Behavioral Health Referral at Discharge   Item 3:  Behavioral Health Consult (Psychiatric Nurse/) and consider Patient Safety Precautions  Item 4:  Immediate Notification of Physician and/or Behavioral Health and Patient Safety Precautions   Item 5:  Immediate Notification of Physician and/or Behavioral Health and Patient Safety Precautions  Item 6:  Over 3 months ago: Behavioral Health Consult (Psychiatric Nurse/) and consider Patient Safety Precautions  OR  Item 6:  3 months ago or less: Immediate Notification of Physician and/or Behavioral Health and Patient Safety Precautions

## 2018-12-30 PROBLEM — I47.20 VT (VENTRICULAR TACHYCARDIA) (H): Status: ACTIVE | Noted: 2018-01-01

## 2018-12-30 NOTE — PROCEDURES
PROCEDURES PERFORMED:   IABP placement     PHYSICIANS:  Attending Physician: Tristin  Interventional Cardiology Fellow: KWESI Nunes     INDICATION:  67 y/o male with hx of NICM EF 15%, rheumatic heart disease s/p MV replacement. She was hypotensive and sent to the cath lab for IABP.     DESCRIPTION:  1. Consent obtained with discussion of risks.  All questions were answered.  2. Sterile prep and procedure.  3. Location with Sheaths:   8Fr RFA  4. Access: Local anesthetic with lidocaine.  A standard 18 gauge needle with ultrasound guidance was used to establish vascular access using a modified Seldinger technique.  5. Estimated blood loss: < 5 ml     The procedure was performed under sedation for 10 minutes from 1220 to 1230.  The patient was assessed immediately before the first sedation medication was administered. Patient on propofol gtt.  Heart rate, BP, respiration, oxygen saturation and patient responses were monitored throughout the procedure with the assistance of the RN under my supervision.     IABP:  An 8Fr sheath was placed in the RFA. A 40cc IABP was placed under fluoroscopy guidance in the proximal descending aorta. Placed at 1:1.     Fluoroscopy Time: 1.3 min     COMPLICATIONS:  None     SUMMARY/PLAN:   IABP at 1:1  Friendship noted to be in RA, advanced to PA. 61cm at the hub.     The attending interventional cardiologist was present and supervised all critical aspects the procedure.     See CVIS report for final draft.     KWESI Nunes MD, PhD   Cardiology Fellow

## 2018-12-30 NOTE — PROGRESS NOTES
IABP placed 2/2 cardiogenic shock, without complication.. A 40 ml 7.5 fr catheter was placed in the right femoral by Dr. Nunes. IABP SETTINGS 1:1 100% Augmentation 80%.

## 2018-12-30 NOTE — Clinical Note
Potential access sites were evaluated for patency using ultrasound.   The right femoral artery was selected.   Access was obtained under ultrasound guidance with direct visualization of needle entry.

## 2018-12-30 NOTE — PROCEDURES
PROCEDURE:   Ultrasound guided right Radial artery line placement.      INDICATION: Blood pressure monitoring, shock     PROCEDURE :   Dr. Ashley Snowden     SUPERVISOR:  Dr. He Gaviria     ATTENDING:  Dr. Misty Lopez    PROCEDURE:   Ultrasound guided right Radial artery line placement.      INDICATION: Blood pressure monitoring, shock     CONSENT: Obtained and in the paper chart     UNIVERSAL PROTOCOL: Patient Identification was verified, time out was performed, site marking was done. Imaging data reviewed. Full Barrier precaution done: Hands washed, mask, gloves, gown, cap and eye protection all used.      PROCEDURE SUMMARY:   An Raheem's test showed good flow through both the radial and ulnar arteries.  The patient was prepped and draped in the usual sterile manner using chlorhexidine scrub. 1% lidocaine was used to numb the region. The right radial artery was palpated and visualized on ultrasound.  The artery was successfully cannulated on the second pass. Pulsatile, arterial blood was visualized and the artery was then threaded with a guide wire using the Seldinger technique.  The needle was removed and ultrasound visualized the wire in the artery, a catheter was threaded over the wire, the wire was removed, and the catheter was sutured into place. A good wave-form was obtained. The patient tolerated the procedure well without any immediate complications. The area was cleaned and a dressing was applied.      Dr Gaviria was present for the key portions of the procedure.     ESTIMATED BLOOD LOSS: 15mL     Ashley Snowden MD  Internal Medicine, PGY 2  P

## 2018-12-30 NOTE — PROCEDURES
PROCEDURE:   Ultrasound guided right Radial artery line placement.     INDICATION: Blood pressure monitoring, shock    PROCEDURE :   Dr. Ashley Snowden    SUPERVISOR:  Dr. He Gaviria    CONSENT: Obtained and in the paper chart    UNIVERSAL PROTOCOL: Patient Identification was verified, time out was performed, site marking was done. Imaging data reviewed. Full Barrier precaution done: Hands washed, mask, gloves, gown, cap and eye protection all used.     PROCEDURE SUMMARY:   An Raheem's test showed good flow through both the radial and ulnar arteries.  The patient was prepped and draped in the usual sterile manner using chlorhexidine scrub. 1% lidocaine was used to numb the region. The right radial artery was palpated and visualized on ultrasound.  The artery was successfully cannulated on the second pass. Pulsatile, arterial blood was visualized and the artery was then threaded with a guide wire using the Seldinger technique.  The needle was removed and ultrasound visualized the wire in the artery, a catheter was threaded over the wire, the wire was removed, and the catheter was sutured into place. A good wave-form was obtained. The patient tolerated the procedure well without any immediate complications. The area was cleaned and a dressing was applied.     Dr Gaviria was present for the key portions of the procedure.    ESTIMATED BLOOD LOSS: 15mL    Ashley Snowden MD  Internal Medicine, PGY 2  P

## 2018-12-30 NOTE — Clinical Note
Patient transferred down from  intubated/lightly sedated on 15 mcg/kg/min propofol gtt. Patient on 3.5 mcg/kg/min and Vaso at 4 units/hr. Patient's swan was in the PA and pressors were found to be infusing through the CVP port and leaking into the swan sheath.

## 2018-12-30 NOTE — PROGRESS NOTES
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 Transferred to [facility] with refractory VT and for consideration for advanced therapies.      # VT storm  # Hx of cardiac arrest 2/2 VF (2008), s/p dual chamber ICD with recurrent shocks  # Prolonged QT  Multiple recent shocks from ICD including few days prior had syncopal episode and was shocked. Prolonged QT seen at outside hospital - PTA meds stopped at OSH: prozac, lexapro, digoxin. Poor candidate for VT ablation due to mechanical aortic and mitral valves. Angiogram done on 12/24/18 with normal coronary arteries. Transferred on amiodarone drip, lidocaine gtt and propranolol TID.  Lidocaine stopped due to elevated level.   - IABP femoral due to hypotension, cardiogenic shock, and risk of precipitating VT with inotropes.   - attempted to pace at higher atrial rate, however MAP did not improve and her VT is quite slow   - Continue amiodarone gtt 0.5 mg/kg   - swan daphne catheter.   - Vasopressin if needed   - Monitor lytes, on replacement protocol     # HFrEF exacerbation, EF 15% 12/21/18  # Non ischemic dilated cardiomyopathy  Sadaf at outside hospital 1.9 with CO 2.75.  BNP on admission 5K with trop 0.1. On lasix drip prior to transfer.  Angiogram without obstructive disease   - Continue ASA   - Previously on coreg and losartan - both stopped recently due to hypotension   - TTE here    # Rheumatic heart disease   # Mitral valve replacement, 29mm St. Reece 1989  # Aortic valve replacement, 23 mm St. Reece 1998  # Atrial  fibrillation, CHADSVASc ~ 4, on warfarin goal 2.5-3.5  Previously on warfarin. Paced on tele. INR 2.4 on admission. holding warfarin   - Heparin gtt   - Monitor INR     # Acute hypoxic respiratory failure  Intubated and sedated.  Ventilation Mode: CMV/AC  (Continuous Mandatory Ventilation/ Assist Control)  FiO2 (%): 40 %  Rate Set (breaths/minute): 12 breaths/min  Tidal Volume Set (mL): 350 mL  PEEP (cm H2O): 5 cmH2O  Oxygen Concentration (%): 40 %  Resp: 12     - Propofol with daily sedation holiday     # NANCY on CKD III  # Hyponatremia  Cr 2.19 on admission. Baseline 1.4. Lactate WNL. Previously on lasix gtt. Electrolyte imbalance likely secondary to hypervolemia. Previously on digoxin which was stopped due to her NANCY.    - Monitor cr   - Cautious use of fluids vs diuresis, monitor CVP for fluid status     # Anemia  Secondary to CKD and chronic disease. No sign of active bleed   - Monitor CBC     Chronic issues:   - Depression: Continue sertraline   - DM: Monitor sugars, hypoglycemia protocol     CODE: FULL  Diet/IVF: NPO, consider tube feeds.  DVT ppx: Heparin gtt  Disposition/Admission Status: Inpatient     Patient was discussed with Dr. Lopez.  Family updated in person today.    Jean Marie Duke MD PGY5  Cardiology Fellow       Subjective:     sedated          Medications:       aspirin  324 mg Oral Once     aspirin  81 mg Oral Daily     docusate sodium  100 mg Oral BID     pantoprazole  40 mg Per NG tube QAM AC     sertraline  50 mg Oral Daily     sodium chloride (PF)  3 mL Intracatheter Q8H       amiodarone 0.5 mg/min (12/30/18 2000)     HEParin 600 Units/hr (12/30/18 2000)     - MEDICATION INSTRUCTIONS -       - MEDICATION INSTRUCTIONS -       propofol (DIPRIVAN) infusion 30 mcg/kg/min (12/30/18 2018)     vasopressin (PITRESSIN) infusion ADULT (40 mL) 1.5 Units/hr (12/30/18 2059)               Objective:          Physical Exam:   Temp:  [97.3  F (36.3  C)-98.6  F (37  C)] 98.4  F (36.9  C)  Heart Rate:   [69-72] 70  Resp:  [12-16] 12  BP: ()/(54-73) 99/59  MAP:  [61 mmHg-71 mmHg] 66 mmHg  Arterial Line BP: (74-88)/(51-59) 81/54  FiO2 (%):  [60 %-100 %] 60 %  SpO2:  [98 %-100 %] 100 %  I/O last 3 completed shifts:  In: 462.21 [I.V.:452.21; NG/GT:10]  Out: 664 [Urine:664]    Vitals:    12/29/18 2100 12/30/18 0000   Weight: 49 kg (108 lb 0.4 oz) 49 kg (108 lb 0.4 oz)     Ventilation Mode: CMV/AC  (Continuous Mandatory Ventilation/ Assist Control)  FiO2 (%): 40 %  Rate Set (breaths/minute): 12 breaths/min  Tidal Volume Set (mL): 350 mL  PEEP (cm H2O): 5 cmH2O  Oxygen Concentration (%): 40 %  Resp: 12    Recent Labs   Lab 12/30/18  1935 12/30/18  1633 12/30/18  1338 12/30/18  1150 12/30/18  0819 12/30/18  0546   PH  --  7.50*  --  7.55* 7.53* 7.61*   PCO2  --  36  --  32* 36 30*   PO2  --  177*  --  179* 180* 184*   HCO3  --  28  --  29* 30* 30*   O2PER 40% 40%  40% 40 40 40 40     MVO2 70%    GEN:  sedated  HEENT:  swan  CV:  Regular rate and rhythm, no murmur or JVD.   LUNGS:  Intubated, mechanical ventilation  ABD:  Active bowel sounds, soft, non-tender/non-distended.  No rebound/guarding/rigidity.  EXT:  No edema or cyanosis.  Hands/feet warm to touch with good signs of peripheral perfusion.          Data:   BMP  Recent Labs   Lab 12/30/18  0447 12/30/18  0110   * 129*   POTASSIUM 3.7 3.6   CHLORIDE 90* 90*   KARINA 8.4* 8.4*   CO2 29 31   BUN 32* 33*   CR 2.05* 2.19*   * 103*     LFTs  Recent Labs   Lab 12/30/18  0110   ALKPHOS 57   AST 56*   ALT 92*   BILITOTAL 1.0   PROTTOTAL 7.2   ALBUMIN 3.7      CBC  Recent Labs   Lab 12/30/18  0447 12/30/18  0110   WBC 8.6 4.9   RBC 3.20* 3.23*   HGB 9.2* 9.3*   HCT 28.4* 29.1*   MCV 89 90   MCH 28.8 28.8   MCHC 32.4 32.0   RDW 14.8 14.7    153     INR  Recent Labs   Lab 12/30/18  0110   INR 2.49*

## 2018-12-30 NOTE — H&P
Cardiology History and Physical    Patient Name: Nicolette Adan MRN# [id number]   Age: 68 year old YOB: 1950     Date of Admission: 12/29/18    Primary care provider: Hesham Mendez  Date of Service: 12/29/2018  Admitting Team: Daquan Johns         Assessment and Plan:   Nicolette Adan is a 68 year old female with history of HFrEF 2/2 nonischemic dilated cardiomyopathy (EF 15%, 12/21/18), rheumatic heart disease s/p MV replacement (29mm St. Reece, 1989), cardiac arrest 2/2 VF in 2008 s/p dual chamber ICD with recurrent ICD shocks, atrial fibrillation. She presented to [facility] for epigastric discomfort and was found to have recurrent VT with ICD shocks. Transferred to [facility] with refractory VT and for consideration for advanced therapies.     # VT storm  # Hx of cardiac arrest 2/2 VF (2008), s/p dual chamber ICD with recurrent shocks  # Prolonged QT  Multiple recent shocks from ICD including few days prior had syncopal episode and was shocked. Prolonged QT seen at outside hospital - PTA meds stopped at OSH: prozac, lexapro, digoxin. Poor candidate for VT ablation due to mechanical aortic and mitral valves. Angiogram done on 12/24/18 with normal coronary arteries. Sent on amiodarone drip, lidocaine gtt and propranolol TID.   - Continue amiodarone gtt   - Monitor hemodynamics   - Check INR in AM   - Alta Qiana placed overnight   - Vasopressin would be first choice for pressor if needed   - Monitor lytes, on replacement protocol   - EKG     # HFrEF exacerbation, EF 15% 12/21/18  # Non ischemic dilated cardiomyopathy  Sadaf at outside hospital 1.9 with CO 2.75.  BNP on admission 5K with trop 0.1. On lasix drip prior to transfer   - Continue ASA   - Previously on coreg and losartan - both stopped recently due to hypotension   - TTE in AM   - Will monitor volume status - restart diuresis in the AM    - Repeat trop to peak    # Rheumatic heart disease   # Mitral valve replacement, 29mm St. Reece 1989  #  Aortic valve replacement, 23 mm St. Reece   # Atrial fibrillation, on warfarin   Previously on warfarin. Paced on tele. INR 2.4 on admission.    - Heparin gtt   - Monitor INR    # Acute hypoxic respiratory failure  Intubated and sedated.   - Continue with vent   - Propofol gtt with bumps    # NANCY on CKD III  # Hyponatremia  Cr 2.19 on admission. Baseline 1.4. Lactate WNL. Previously on lasix gtt. Electrolyte imbalance likely secondary to hypervolemia. Previously on digoxin which was stopped due to her NANCY.    - Monitor cr   - Cautious use of fluids vs diuresis, monitor CVP for fluid status    # Anemia  Secondary to CKD and chronic disease. No sign of active bleed   - Monitor CBC    Chronic issues:   - Depression: Continue sertraline   - DM: Monitor sugars, hypoglycemia protocol    CODE: FULL  Diet/IVF: NPO  DVT ppx: Heparin gtt  Disposition/Admission Status: Inpatient    Patient was discussed with cardiology fellow, Dr. Gaviria, and will be formally staffed in AM.    Ashley Snowden MD  Internal Medicine, PGY 2  P         Chief Complaint:   VT storm         HPI:   Nicolette Adan is a 68 year old female with history of HFrEF 2/2 nonischemic dilated cardiomyopathy (EF 15%, 18), rheumatic heart disease s/p MV replacement (29mm St. Reece, )and aortic valve replacement (23 mm St. Reece, ), cardiac arrest 2/2 VF in  s/p dual chamber ICD with recurrent ICD shocks, atrial fibrillation. She presented to [facility] on 18 for epigastric pain. She had recently had a syncopal episode and her ICD discharged - she was hospitalized for that, found to be hypotensive, and her carvedilol and losartan were stopped. During her stay at [facility], she was found to have recurrent ventricular tachycardia and was receiving ICD shocks. She was found to have a prolonged QT. There was concern regarding EP involvement due to her mechanical mitral and aortic valves so she was managed medically with  amiodarone. A coronary angiogram was done on 12/24/18 which showed patent coronary arteries. A RHC showed RA 17, RV 49/6, PA 47/22 with a mean of 31, wedge 19, DOMENICO CO 2.75, CI, 1.9 on 12/24/18. Her ICD was reprogrammed with VT-1 zone set at 130 bpm and detection time 3 seconds. Was started on lidocaine gtt 12/25/18.     Transferred to [facility] on 12/30/18 intubated, sedated on multiple drips. Family updated by phone at time of presentation.         Past Medical History:     Past Medical History:   Diagnosis Date     Anxiety      Congestive heart failure (H)      Heart murmur      History of blood transfusion           Past Surgical History:     Past Surgical History:   Procedure Laterality Date     APPENDECTOMY       CARDIAC SURGERY            Social History:     Social History     Socioeconomic History     Marital status:      Spouse name: Not on file     Number of children: Not on file     Years of education: Not on file     Highest education level: Not on file   Occupational History     Not on file   Tobacco Use     Smoking status: Never Smoker     Smokeless tobacco: Never Used   Substance and Sexual Activity     Alcohol use: Yes     Alcohol/week: 0.6 oz     Types: 1 Glasses of wine per week     Comment: occational     Drug use: No     Sexual activity: Not on file   Other Topics Concern     Not on file   Social History Narrative     Not on file          Family History:   Unable to assess due to sedation         Allergies:    No Known Allergies         Medications:     Prior to Admission Medications   Prescriptions Last Dose Informant Patient Reported? Taking?   CARVEDILOL PO   Yes No   Sig: Take 12.5 mg by mouth 2 times daily (with meals)   DIGOXIN PO   Yes No   Sig: Take 125 mcg by mouth   ESCITALOPRAM OXALATE PO   Yes No   Sig: Take 5 mg by mouth daily   Furosemide (LASIX PO)   Yes No   Sig: Take 2.5 mg by mouth Take with 2lb wt gain, take 2 tabs   LISINOPRIL PO   Yes No   Sig: Take 1.25 mg by mouth 2 times  daily   Warfarin Sodium (COUMADIN PO)   Yes No   calcium-vitamin D 500-125 MG-UNIT TABS   Yes No   Sig: Take 1 tablet by mouth daily      Facility-Administered Medications: None             Review of Systems:     Unable to assess due to sedation.          Physical Exam:     BP 99/59   Temp 97.3  F (36.3  C)   Resp 16   Ht 1.524 m (5')   Wt 49 kg (108 lb 0.4 oz)   SpO2 100%   BMI 21.10 kg/m      Gen: sedated, intubated, pale  HEENT: eyes closed, pupils reactive  Resp: coarse sounding, ventilated, no wheezes  Cardiac: RRR, possible S4, paced  GI: soft, non-tender, non-distended, normal bowel sounds  Ext: WWP, no edema  Neuro: sedated         Data:   Reviewed labs and imaging from information sent with patient and in Epic.

## 2018-12-30 NOTE — PROCEDURES
Central line placement and Ashley-Qiana catheter placement procedure note.    Attending Cardiology Staff: Dr. Jessica MD  Cardiology Fellow: He Gaviria MD  Resident: Ashley Snowden MD    PROCEDURE:   # Right heart catheterization with ultrasound and fluoroscopic guidance    INDICATION:  Pt  is a 67 yo female with a history of recently diagnosed cardiogenic shock who presents for further evaluation and management from OSH, requiring central line placement for access and Ashley-Qiana placement for hemodynamic monitoring.    BRIEF PROCEDURE NOTE:  After informing the benefits and risks, an informed consent was obtained from the . A time out and site verification were done prior to initiation of the procedure. The right neck was prepped and draped in the usual sterile fashion and infiltrated with a 2% lidocaine under ultrasound guidance. A Cordis MAC sheath was placed in the right internal jugular vein using modified Seldinger technique over wire under ultrasound guidance. Chest xray confirmed the position of the sheath, no complications noted. RHC was performed using a 7 Fr Ashley-Qiana catheter. The Ashley Qiana catheter balloon was inflated and advanced to the pulmonary capillary wedge position under fluoroscopy. The balloon was deflated and the Ashley-Qiana catheter was left in the Right pulmonary artery. Unfortunately the catheter moved out of the main pulmonary artery position, and due to difficulty advancing the catheter back into the main pulmonary artery, it was retracted and left in the right atrium. Follow up chest xray confirmed position, no complications.    Complications: None   Fluoroscopy time: <5 min  Blood loss: Minimal blood loss    He Gaviria MD  PGY6 Cardiology  Pager: [phone]

## 2018-12-30 NOTE — PLAN OF CARE
Assessment:   Cardiac: 100% Paced with frequent/occasional PVCs, CVP 11. Amiodarone gtt infusing, see MAR/doc flow sheet. Heparin gtt on hold d/t therapeutic INR.   Resp: CMV 40%/350/12/5, Dr. Gaviria aware of ABG. Will recheck in am.   Neuro: Follows command and moves all extremities. Propofol providing adequate sedation.  : Dukes patent, UO  cc/hr.   GI: NG to LIS.  Skin: Intact.  Endocrine: No supplemental insulin required.   Pain: Denies pain.     Interventions: Lucile and art-line placed at bedside, Lucile currently in RA to be repositioned today. Dr. Snowden notified critical lidocaine level 8.3, lidocaine gtt stopped upon arrival to unit.     Plan: Will continue to monitor/assess and update MD as needed.

## 2018-12-30 NOTE — Clinical Note
IABP inserted in the right femoral artery. IABP inserted with 40 cc balloon volume Lot number is: [id number]

## 2018-12-30 NOTE — PROGRESS NOTES
Admitted/transferred from: [facility]  Reason for admission/transfer:  VT storm  Patient status upon admission/transfer: 4E room 4509  Interventions:  Place bedside swan and art-line.   Plan: Start amio gtt, discontinue lidocaine gtt, hold heparin gtt d/t therapeutic INR, hold lasix gtt until hemodynamic numbers.  2 RN skin assessment: completed by Mary Ann  Result of skin assessment and interventions/actions: Preventive mepliex, Isolibrium mattress, heel boots.   Height, weight, drug calc weight: done  Patient belongings:  none  MDRO education (if applicable):

## 2018-12-31 NOTE — PLAN OF CARE
Follows commands, denies pain. Sedated w/ Propofol @ 20. HR paced at 70 BPM, MAP goal >65 on Vasopressin 1.0-2.0 units/hr. IABP 1:1, 80% augmentation. CVPs 12, 10, 5. MD aware. Fransisoc ALMODOVAR waveform questionable. NPO w/ NG to LIS. Blood sugar 45, gave 1 amp dextrose. Heparin gtt decreased to 700 units/hr, recheck heparin 10A at 1100. Amiodarone remains at 0.5.    Pressure support 7/5 since 0615. RR 13-16 and -400's.    Plan: continue to monitor closely and notify CARDS 2 with any changes. Dayshift to page EP today.

## 2018-12-31 NOTE — PLAN OF CARE
Patient extubated at 1343. 4LNC with oxygen saturations 95 - 100 percent. IABP 1:1 augmenting 80 percent, pressures 80s / 50s, MAPS 70s - 80s. Vasopressin at 1.5 units/hr. Heparin therapeutic at 700 units / hr. Amio at 0.5 mg/hr. Monitoring lytes, replacing as needed. AV paced rhythm, occasional PVCs, rate of 70. Device nurse interrogated ICD. Lower threshold for ATP 90 - 160, VT Zone 160 - 200. Last Shock on 12/18. Last episode of VT yesterday at 1600. Plan to potentially remove IABP tomorrow, depending on hemodynamic stability.

## 2018-12-31 NOTE — PROGRESS NOTES
[facility] [place]  Procedure Note          Extubation:       Nicolette Adan  MRN# [id number]   December 31, 2018         Patient extubated at: 1343   Supplemental Oxygen: Via nasal cannula at 4 liters per minute   Cough: The cough is weak   Secretion Mode: Able to clear   Secretion Amount: Scant amount, moderately thin and white / yellow in color   Respiratory Exam:: Breath sounds: diminished     Location: all lobes   Skin Exam:: Patient color: natural   Patient Status: Currently appears comfortable   Arterial Blood Gasses: pH Arterial (pH)   Date Value   12/30/2018 7.50 (H)     pO2 Arterial (mm Hg)   Date Value   12/30/2018 177 (H)     pCO2 Arterial (mm Hg)   Date Value   12/30/2018 36     Bicarbonate Arterial (mmol/L)   Date Value   12/30/2018 28            Recorded by Missy Pate

## 2018-12-31 NOTE — PROGRESS NOTES
[facility] Cardiology Progress Note           Assessment and Plan:   Nicolette Adan is a 68 year old female with history of HFrEF 2/2 nonischemic dilated cardiomyopathy (EF 15%, 12/21/18), rheumatic heart disease s/p mechanical MV and AV replacement, atrial fibrillation, and sudden cardiac arrest ventricular fibrillation in 2008 s/p dual chamber ICD with recurrent ICD shocks,  She presented to [facility] for epigastric discomfort and was found to have recurrent VT with ICD shocks. Transferred to [facility] with refractory VT and for consideration for advanced therapies.    Since patient has been here she has been quite regarding VT.  IABP was placed due to hypotension despite vasopressin.  She was extubated 12/31.       # VT storm  # Hx of cardiac arrest 2/2 VF (2008), s/p dual chamber ICD with recurrent shocks  # Prolonged QT  Multiple recent shocks from ICD including few days prior had syncopal episode and was shocked. Prolonged QT seen at outside hospital - PTA meds stopped at OSH: prozac, lexapro, digoxin. Poor candidate for VT ablation due to mechanical aortic and mitral valves. Angiogram done on 12/24/18 with normal coronary arteries. Transferred on amiodarone drip, lidocaine gtt and propranolol TID.  Lidocaine stopped due to elevated level.   - IABP femoral due to hypotension, cardiogenic shock, and risk of precipitating VT with inotropes.   - attempted to pace at higher atrial rate, however MAP did not improve and her VT is quite slow    - Continue amiodarone gtt 0.5 mg/kg   - swan daphne catheter, adjusted with fluoro   - Vasopressin as needed   - Monitor lytes, on replacement protocol   - pacer interrogation and EP consult     # HFrEF exacerbation, EF 15% 12/21/18  # Non ischemic dilated cardiomyopathy  Sadaf at outside hospital 1.9 with CO 2.75.  BNP on admission 5K with trop 0.1. On lasix drip prior to transfer.  Angiogram without obstructive disease   - Continue ASA   - Previously on coreg and  losartan - both stopped recently due to hypotension   - TTE here    # Rheumatic heart disease   # Mitral valve replacement, 29mm St. Reece 1989  # Aortic valve replacement, 23 mm St. Reece 1998  # Atrial fibrillation, CHADSVASc ~ 4, on warfarin goal 2.5-3.5  Previously on warfarin. Paced on tele. INR 2.4 on admission. holding warfarin   - Heparin gtt   - Monitor INR     # NANCY on CKD III  # Hyponatremia  Cr 2.19 on admission. Baseline 1.4. Lactate WNL. Previously on lasix gtt. Electrolyte imbalance likely secondary to hypervolemia. Previously on digoxin which was stopped due to her NANCY.    - Monitor cr   - Cautious use of fluids vs diuresis, monitor CVP for fluid status     # Anemia  Secondary to CKD and chronic disease. No sign of active bleed   - Monitor CBC     Chronic issues:   - Depression: Continue sertraline   - DM: Monitor sugars, hypoglycemia protocol     CODE: FULL  Diet/IVF: speech study  DVT ppx: Heparin gtt  Disposition/Admission Status: Inpatient     Patient was discussed with Dr. Greenwood.  Family updated in person today.    Jean Marie Duke MD PGY5  Cardiology Fellow       Subjective:     Extubated.  Feels ok.  Hoarse voice.  No chest discomfort          Medications:       aspirin  324 mg Oral Once     aspirin  81 mg Oral Daily     docusate sodium  100 mg Oral BID     pantoprazole  40 mg Per NG tube QAM AC     sertraline  50 mg Oral Daily     sodium chloride (PF)  3 mL Intracatheter Q8H       amiodarone 0.5 mg/min (12/31/18 0800)     HEParin 700 Units/hr (12/31/18 0800)     - MEDICATION INSTRUCTIONS -       - MEDICATION INSTRUCTIONS -       propofol (DIPRIVAN) infusion Stopped (12/31/18 0619)     vasopressin (PITRESSIN) infusion ADULT (40 mL) 1.5 Units/hr (12/31/18 0800)               Objective:          Physical Exam:   Temp:  [94.1  F (34.5  C)-99  F (37.2  C)] 98.96  F (37.2  C)  Heart Rate:  [] 71  Resp:  [12-22] 22  MAP:  [50 mmHg-86 mmHg] 67 mmHg  Arterial Line BP: ()/(39-62) 85/43  FiO2  (%):  [40 %] 40 %  SpO2:  [98 %-100 %] 99 %  I/O last 3 completed shifts:  In: 1250.1 [I.V.:975.1; NG/GT:125]  Out: 1251 [Urine:1001; Emesis/NG output:250]    Vitals:    12/29/18 2100 12/30/18 0000 12/31/18 0000   Weight: 49 kg (108 lb 0.4 oz) 49 kg (108 lb 0.4 oz) 48.1 kg (106 lb 0.7 oz)     Ventilation Mode: (S) CPAP/PS  (Continuous positive airway pressure with Pressure Support)  FiO2 (%): 40 %  Rate Set (breaths/minute): 12 breaths/min  Tidal Volume Set (mL): 350 mL  PEEP (cm H2O): 5 cmH2O  Pressure Support (cm H2O): 7 cmH2O  Oxygen Concentration (%): 40 %  Resp: 22    Recent Labs   Lab 12/31/18  0748 12/31/18  0345 12/30/18  2340 12/30/18  1935 12/30/18  1633  12/30/18  1150 12/30/18  0819 12/30/18  0546   PH  --   --   --   --  7.50*  --  7.55* 7.53* 7.61*   PCO2  --   --   --   --  36  --  32* 36 30*   PO2  --   --   --   --  177*  --  179* 180* 184*   HCO3  --   --   --   --  28  --  29* 30* 30*   O2PER 40 40 40 40% 40%  40%   < > 40 40 40    < > = values in this interval not displayed.     MVO2 70%    GEN: alert and oriented  HEENT:  swan  CV:  Regular rate and rhythm, holosystolic murmur, click  LUNGS:  Extubated, on nasal canula, inspiratory rales at bases  GROIN: femoral IABP  ABD:  Active bowel sounds, soft, non-tender/non-distended.  No rebound/guarding/rigidity.  EXT:  No edema or cyanosis.  Hands/feet warm to touch with good signs of peripheral perfusion.          Data:   BMP  Recent Labs   Lab 12/31/18  0700 12/31/18  0120 12/30/18  2023 12/30/18  1935 12/30/18  1633 12/30/18  1150  12/30/18  0447 12/30/18  0110   NA  --   --   --   --  131* 130*  --  130* 129*   POTASSIUM 4.0 3.7 4.3 2.5* 3.8 4.1   < > 3.7 3.6   CHLORIDE  --   --   --   --  94 92*  --  90* 90*   KARINA  --   --   --   --  8.2* 8.4*  --  8.4* 8.4*   CO2  --   --   --   --  27 26  --  29 31   BUN  --   --   --   --  31* 34*  --  32* 33*   CR  --   --   --   --  1.96* 2.16*  --  2.05* 2.19*   GLC  --   --   --   --  110* 94  --  112*  103*    < > = values in this interval not displayed.     LFTs  Recent Labs   Lab 12/30/18  1150 12/30/18  0110   ALKPHOS 53 57   AST 45 56*   ALT 84* 92*   BILITOTAL 1.2 1.0   PROTTOTAL 7.0 7.2   ALBUMIN 3.6 3.7      CBC  Recent Labs   Lab 12/30/18  1150 12/30/18  0447 12/30/18  0110   WBC 9.2 8.6 4.9   RBC 3.14* 3.20* 3.23*   HGB 9.3* 9.2* 9.3*   HCT 27.7* 28.4* 29.1*   MCV 88 89 90   MCH 29.6 28.8 28.8   MCHC 33.6 32.4 32.0   RDW 15.2* 14.8 14.7    170 153     INR  Recent Labs   Lab 12/30/18  1150 12/30/18  0110   INR 2.44* 2.49*

## 2018-12-31 NOTE — PLAN OF CARE
S:  Pt hemodynamically unstable with decreasing UO requiring placement of IABP.    B:  Pt admitted with worsening HF s/p VT storm requiring lido and amiodarone gtt's.  A:  Pt began having runs of slow VT with hypotension after placement of IABP.  Fluid bolus of 250cc NS and restarting of Vaso improved BP and resolved hypotension.  Pt intact neurologically, follows commands and moves all extremities.  K replaced this afternoon for K of 3.8.  Pt currently AV paced with frequent PVC's.  P:  Continue to monitor labs, hemodynamics, cardiac rhythm closely.  Possible removal of IABP in next 1-2 days if cardiac rhythm remains stable.

## 2019-01-01 ENCOUNTER — APPOINTMENT (OUTPATIENT)
Dept: PHYSICAL THERAPY | Facility: CLINIC | Age: 69
DRG: 270 | End: 2019-01-01
Attending: INTERNAL MEDICINE
Payer: MEDICARE

## 2019-01-01 ENCOUNTER — APPOINTMENT (OUTPATIENT)
Dept: ULTRASOUND IMAGING | Facility: CLINIC | Age: 69
DRG: 270 | End: 2019-01-01
Attending: INTERNAL MEDICINE
Payer: MEDICARE

## 2019-01-01 ENCOUNTER — APPOINTMENT (OUTPATIENT)
Dept: GENERAL RADIOLOGY | Facility: CLINIC | Age: 69
DRG: 270 | End: 2019-01-01
Attending: INTERNAL MEDICINE
Payer: MEDICARE

## 2019-01-01 ENCOUNTER — APPOINTMENT (OUTPATIENT)
Dept: CT IMAGING | Facility: CLINIC | Age: 69
DRG: 270 | End: 2019-01-01
Attending: INTERNAL MEDICINE
Payer: MEDICARE

## 2019-01-01 ENCOUNTER — ANESTHESIA (OUTPATIENT)
Dept: INTENSIVE CARE | Facility: CLINIC | Age: 69
DRG: 270 | End: 2019-01-01
Payer: MEDICARE

## 2019-01-01 ENCOUNTER — ANESTHESIA EVENT (OUTPATIENT)
Dept: INTENSIVE CARE | Facility: CLINIC | Age: 69
DRG: 270 | End: 2019-01-01
Payer: MEDICARE

## 2019-01-01 ENCOUNTER — APPOINTMENT (OUTPATIENT)
Dept: OCCUPATIONAL THERAPY | Facility: CLINIC | Age: 69
DRG: 270 | End: 2019-01-01
Attending: INTERNAL MEDICINE
Payer: MEDICARE

## 2019-01-01 ENCOUNTER — DOCUMENTATION ONLY (OUTPATIENT)
Dept: TRANSPLANT | Facility: CLINIC | Age: 69
End: 2019-01-01

## 2019-01-01 VITALS
TEMPERATURE: 98.3 F | HEIGHT: 60 IN | HEART RATE: 55 BPM | BODY MASS INDEX: 19.71 KG/M2 | DIASTOLIC BLOOD PRESSURE: 41 MMHG | WEIGHT: 100.4 LBS | RESPIRATION RATE: 20 BRPM | OXYGEN SATURATION: 91 % | SYSTOLIC BLOOD PRESSURE: 49 MMHG

## 2019-01-01 LAB
A* LOCUS: NORMAL
A*: NORMAL
ABO + RH BLD: NORMAL
ABTEST METHOD: NORMAL
ACETAMINOPHEN QUAL: NEGATIVE
ALBUMIN SERPL-MCNC: 3.2 G/DL (ref 3.4–5)
ALBUMIN SERPL-MCNC: 3.5 G/DL (ref 3.4–5)
ALBUMIN SERPL-MCNC: 3.6 G/DL (ref 3.4–5)
ALBUMIN UR-MCNC: NEGATIVE MG/DL
ALP SERPL-CCNC: 50 U/L (ref 40–150)
ALP SERPL-CCNC: 54 U/L (ref 40–150)
ALP SERPL-CCNC: 54 U/L (ref 40–150)
ALT SERPL W P-5'-P-CCNC: 12 U/L (ref 0–50)
ALT SERPL W P-5'-P-CCNC: 169 U/L (ref 0–50)
ALT SERPL W P-5'-P-CCNC: 36 U/L (ref 0–50)
AMANTADINE: NEGATIVE
AMITRIPTYLINE QUAL: NEGATIVE
AMMONIA PLAS-SCNC: 67 UMOL/L (ref 10–50)
AMOXAPINE: NEGATIVE
AMPHETAMINES QUAL: NEGATIVE
ANION GAP SERPL CALCULATED.3IONS-SCNC: 10 MMOL/L (ref 3–14)
ANION GAP SERPL CALCULATED.3IONS-SCNC: 10 MMOL/L (ref 3–14)
ANION GAP SERPL CALCULATED.3IONS-SCNC: 11 MMOL/L (ref 3–14)
ANION GAP SERPL CALCULATED.3IONS-SCNC: 11 MMOL/L (ref 3–14)
ANION GAP SERPL CALCULATED.3IONS-SCNC: 12 MMOL/L (ref 3–14)
ANION GAP SERPL CALCULATED.3IONS-SCNC: 13 MMOL/L (ref 3–14)
ANION GAP SERPL CALCULATED.3IONS-SCNC: 15 MMOL/L (ref 3–14)
ANION GAP SERPL CALCULATED.3IONS-SCNC: 4 MMOL/L (ref 3–14)
ANION GAP SERPL CALCULATED.3IONS-SCNC: 5 MMOL/L (ref 3–14)
ANION GAP SERPL CALCULATED.3IONS-SCNC: 6 MMOL/L (ref 3–14)
ANION GAP SERPL CALCULATED.3IONS-SCNC: 7 MMOL/L (ref 3–14)
ANION GAP SERPL CALCULATED.3IONS-SCNC: 8 MMOL/L (ref 3–14)
ANION GAP SERPL CALCULATED.3IONS-SCNC: 9 MMOL/L (ref 3–14)
ANISOCYTOSIS BLD QL SMEAR: SLIGHT
APPEARANCE UR: ABNORMAL
APTT PPP: 54 SEC (ref 22–37)
AST SERPL W P-5'-P-CCNC: 13 U/L (ref 0–45)
AST SERPL W P-5'-P-CCNC: 18 U/L (ref 0–45)
AST SERPL W P-5'-P-CCNC: 302 U/L (ref 0–45)
ATROPINE: NEGATIVE
B* LOCUS: NORMAL
B*: NORMAL
BACTERIA #/AREA URNS HPF: ABNORMAL /HPF
BACTERIA SPEC CULT: ABNORMAL
BACTERIA SPEC CULT: ABNORMAL
BASE DEFICIT BLDV-SCNC: 0.3 MMOL/L
BASE DEFICIT BLDV-SCNC: 0.4 MMOL/L
BASE DEFICIT BLDV-SCNC: 0.6 MMOL/L
BASE DEFICIT BLDV-SCNC: 0.7 MMOL/L
BASE DEFICIT BLDV-SCNC: 1.3 MMOL/L
BASE DEFICIT BLDV-SCNC: 1.6 MMOL/L
BASE EXCESS BLDV CALC-SCNC: 1.5 MMOL/L
BASE EXCESS BLDV CALC-SCNC: 1.6 MMOL/L
BASE EXCESS BLDV CALC-SCNC: 1.7 MMOL/L
BASE EXCESS BLDV CALC-SCNC: 1.9 MMOL/L
BASE EXCESS BLDV CALC-SCNC: 2.1 MMOL/L
BASE EXCESS BLDV CALC-SCNC: 2.2 MMOL/L
BASE EXCESS BLDV CALC-SCNC: 2.2 MMOL/L
BASE EXCESS BLDV CALC-SCNC: 2.7 MMOL/L
BASE EXCESS BLDV CALC-SCNC: 2.9 MMOL/L
BASE EXCESS BLDV CALC-SCNC: 3.2 MMOL/L
BASE EXCESS BLDV CALC-SCNC: 3.3 MMOL/L
BASE EXCESS BLDV CALC-SCNC: 3.3 MMOL/L
BASE EXCESS BLDV CALC-SCNC: 3.5 MMOL/L
BASE EXCESS BLDV CALC-SCNC: 3.5 MMOL/L
BASE EXCESS BLDV CALC-SCNC: 3.7 MMOL/L
BASE EXCESS BLDV CALC-SCNC: 3.7 MMOL/L
BASE EXCESS BLDV CALC-SCNC: 3.8 MMOL/L
BASE EXCESS BLDV CALC-SCNC: 3.9 MMOL/L
BASE EXCESS BLDV CALC-SCNC: 4.2 MMOL/L
BASE EXCESS BLDV CALC-SCNC: 4.2 MMOL/L
BASE EXCESS BLDV CALC-SCNC: 4.4 MMOL/L
BASE EXCESS BLDV CALC-SCNC: 4.7 MMOL/L
BASE EXCESS BLDV CALC-SCNC: 4.7 MMOL/L
BASE EXCESS BLDV CALC-SCNC: 4.8 MMOL/L
BASE EXCESS BLDV CALC-SCNC: 5 MMOL/L
BASE EXCESS BLDV CALC-SCNC: 5.3 MMOL/L
BASE EXCESS BLDV CALC-SCNC: 5.5 MMOL/L
BASE EXCESS BLDV CALC-SCNC: 5.6 MMOL/L
BASE EXCESS BLDV CALC-SCNC: 5.8 MMOL/L
BASE EXCESS BLDV CALC-SCNC: 5.9 MMOL/L
BASE EXCESS BLDV CALC-SCNC: 6.1 MMOL/L
BASE EXCESS BLDV CALC-SCNC: 6.3 MMOL/L
BASE EXCESS BLDV CALC-SCNC: 6.5 MMOL/L
BASE EXCESS BLDV CALC-SCNC: 6.5 MMOL/L
BASE EXCESS BLDV CALC-SCNC: 6.6 MMOL/L
BASE EXCESS BLDV CALC-SCNC: 6.6 MMOL/L
BASE EXCESS BLDV CALC-SCNC: 6.7 MMOL/L
BASE EXCESS BLDV CALC-SCNC: 6.8 MMOL/L
BASE EXCESS BLDV CALC-SCNC: 6.9 MMOL/L
BASE EXCESS BLDV CALC-SCNC: 6.9 MMOL/L
BASE EXCESS BLDV CALC-SCNC: 7.1 MMOL/L
BASE EXCESS BLDV CALC-SCNC: 7.3 MMOL/L
BASE EXCESS BLDV CALC-SCNC: 7.5 MMOL/L
BASE EXCESS BLDV CALC-SCNC: 7.5 MMOL/L
BASE EXCESS BLDV CALC-SCNC: 7.6 MMOL/L
BASE EXCESS BLDV CALC-SCNC: 7.7 MMOL/L
BASE EXCESS BLDV CALC-SCNC: 7.9 MMOL/L
BASOPHILS # BLD AUTO: 0 10E9/L (ref 0–0.2)
BASOPHILS # BLD AUTO: 0.1 10E9/L (ref 0–0.2)
BASOPHILS NFR BLD AUTO: 0 %
BASOPHILS NFR BLD AUTO: 0.9 %
BENZODIAZ UR QL: NEGATIVE
BILIRUB DIRECT SERPL-MCNC: 0.3 MG/DL (ref 0–0.2)
BILIRUB DIRECT SERPL-MCNC: 0.3 MG/DL (ref 0–0.2)
BILIRUB SERPL-MCNC: 0.6 MG/DL (ref 0.2–1.3)
BILIRUB SERPL-MCNC: 0.7 MG/DL (ref 0.2–1.3)
BILIRUB SERPL-MCNC: 1.3 MG/DL (ref 0.2–1.3)
BILIRUB UR QL STRIP: NEGATIVE
BLD GP AB SCN SERPL QL: NORMAL
BLOOD BANK CMNT PATIENT-IMP: NORMAL
BUN SERPL-MCNC: 22 MG/DL (ref 7–30)
BUN SERPL-MCNC: 22 MG/DL (ref 7–30)
BUN SERPL-MCNC: 23 MG/DL (ref 7–30)
BUN SERPL-MCNC: 23 MG/DL (ref 7–30)
BUN SERPL-MCNC: 24 MG/DL (ref 7–30)
BUN SERPL-MCNC: 25 MG/DL (ref 7–30)
BUN SERPL-MCNC: 26 MG/DL (ref 7–30)
BUN SERPL-MCNC: 27 MG/DL (ref 7–30)
BUN SERPL-MCNC: 27 MG/DL (ref 7–30)
BUN SERPL-MCNC: 28 MG/DL (ref 7–30)
BUN SERPL-MCNC: 29 MG/DL (ref 7–30)
BUN SERPL-MCNC: 30 MG/DL (ref 7–30)
BUN SERPL-MCNC: 31 MG/DL (ref 7–30)
BUN SERPL-MCNC: 33 MG/DL (ref 7–30)
BUN SERPL-MCNC: 35 MG/DL (ref 7–30)
BUN SERPL-MCNC: 36 MG/DL (ref 7–30)
BUN SERPL-MCNC: 42 MG/DL (ref 7–30)
BUN SERPL-MCNC: 51 MG/DL (ref 7–30)
BUN SERPL-MCNC: 60 MG/DL (ref 7–30)
BUN SERPL-MCNC: 61 MG/DL (ref 7–30)
BW-1: NORMAL
C* LOCUS: NORMAL
C*: NORMAL
CA-I BLD-MCNC: 4.8 MG/DL (ref 4.4–5.2)
CA-I BLD-MCNC: 4.8 MG/DL (ref 4.4–5.2)
CA-I BLD-MCNC: 4.9 MG/DL (ref 4.4–5.2)
CAFFEINE QUAL: NEGATIVE
CALCIUM SERPL-MCNC: 7.1 MG/DL (ref 8.5–10.1)
CALCIUM SERPL-MCNC: 7.9 MG/DL (ref 8.5–10.1)
CALCIUM SERPL-MCNC: 8.1 MG/DL (ref 8.5–10.1)
CALCIUM SERPL-MCNC: 8.1 MG/DL (ref 8.5–10.1)
CALCIUM SERPL-MCNC: 8.3 MG/DL (ref 8.5–10.1)
CALCIUM SERPL-MCNC: 8.4 MG/DL (ref 8.5–10.1)
CALCIUM SERPL-MCNC: 8.5 MG/DL (ref 8.5–10.1)
CALCIUM SERPL-MCNC: 8.6 MG/DL (ref 8.5–10.1)
CALCIUM SERPL-MCNC: 8.7 MG/DL (ref 8.5–10.1)
CALCIUM SERPL-MCNC: 8.8 MG/DL (ref 8.5–10.1)
CALCIUM SERPL-MCNC: 8.9 MG/DL (ref 8.5–10.1)
CALCIUM SERPL-MCNC: 9 MG/DL (ref 8.5–10.1)
CALCIUM SERPL-MCNC: 9 MG/DL (ref 8.5–10.1)
CALCIUM SERPL-MCNC: 9.1 MG/DL (ref 8.5–10.1)
CANNABINOIDS UR QL SCN: NEGATIVE
CARBAMAZEPINE QUAL: NEGATIVE
CARDIOLIPIN ANTIBODY IGG: <1.6 GPL-U/ML (ref 0–19.9)
CARDIOLIPIN ANTIBODY IGM: 1.4 MPL-U/ML (ref 0–19.9)
CHLORIDE SERPL-SCNC: 100 MMOL/L (ref 94–109)
CHLORIDE SERPL-SCNC: 101 MMOL/L (ref 94–109)
CHLORIDE SERPL-SCNC: 102 MMOL/L (ref 94–109)
CHLORIDE SERPL-SCNC: 102 MMOL/L (ref 94–109)
CHLORIDE SERPL-SCNC: 104 MMOL/L (ref 94–109)
CHLORIDE SERPL-SCNC: 93 MMOL/L (ref 94–109)
CHLORIDE SERPL-SCNC: 94 MMOL/L (ref 94–109)
CHLORIDE SERPL-SCNC: 95 MMOL/L (ref 94–109)
CHLORIDE SERPL-SCNC: 96 MMOL/L (ref 94–109)
CHLORIDE SERPL-SCNC: 96 MMOL/L (ref 94–109)
CHLORIDE SERPL-SCNC: 97 MMOL/L (ref 94–109)
CHLORIDE SERPL-SCNC: 98 MMOL/L (ref 94–109)
CHLORIDE SERPL-SCNC: 99 MMOL/L (ref 94–109)
CHLORPHENIRAMINE: NEGATIVE
CHLORPROMAZINE: NEGATIVE
CHOLEST SERPL-MCNC: 168 MG/DL
CITALOPRAM QUAL: NEGATIVE
CLOMIPRAMINE QUAL: NEGATIVE
CMV IGG SERPL QL IA: 5.2 AI (ref 0–0.8)
CO2 SERPL-SCNC: 24 MMOL/L (ref 20–32)
CO2 SERPL-SCNC: 24 MMOL/L (ref 20–32)
CO2 SERPL-SCNC: 26 MMOL/L (ref 20–32)
CO2 SERPL-SCNC: 27 MMOL/L (ref 20–32)
CO2 SERPL-SCNC: 28 MMOL/L (ref 20–32)
CO2 SERPL-SCNC: 29 MMOL/L (ref 20–32)
CO2 SERPL-SCNC: 30 MMOL/L (ref 20–32)
CO2 SERPL-SCNC: 31 MMOL/L (ref 20–32)
CO2 SERPL-SCNC: 32 MMOL/L (ref 20–32)
CO2 SERPL-SCNC: 32 MMOL/L (ref 20–32)
CO2 SERPL-SCNC: 34 MMOL/L (ref 20–32)
COCAINE QUAL: NEGATIVE
COCAINE UR QL: NEGATIVE
CODEINE QUAL: NEGATIVE
COLOR UR AUTO: YELLOW
COPATH REPORT: NORMAL
COPATH REPORT: NORMAL
CREAT SERPL-MCNC: 1.12 MG/DL (ref 0.52–1.04)
CREAT SERPL-MCNC: 1.13 MG/DL (ref 0.52–1.04)
CREAT SERPL-MCNC: 1.21 MG/DL (ref 0.52–1.04)
CREAT SERPL-MCNC: 1.22 MG/DL (ref 0.52–1.04)
CREAT SERPL-MCNC: 1.24 MG/DL (ref 0.52–1.04)
CREAT SERPL-MCNC: 1.24 MG/DL (ref 0.52–1.04)
CREAT SERPL-MCNC: 1.26 MG/DL (ref 0.52–1.04)
CREAT SERPL-MCNC: 1.28 MG/DL (ref 0.52–1.04)
CREAT SERPL-MCNC: 1.29 MG/DL (ref 0.52–1.04)
CREAT SERPL-MCNC: 1.3 MG/DL (ref 0.52–1.04)
CREAT SERPL-MCNC: 1.3 MG/DL (ref 0.52–1.04)
CREAT SERPL-MCNC: 1.31 MG/DL (ref 0.52–1.04)
CREAT SERPL-MCNC: 1.31 MG/DL (ref 0.52–1.04)
CREAT SERPL-MCNC: 1.32 MG/DL (ref 0.52–1.04)
CREAT SERPL-MCNC: 1.33 MG/DL (ref 0.52–1.04)
CREAT SERPL-MCNC: 1.35 MG/DL (ref 0.52–1.04)
CREAT SERPL-MCNC: 1.35 MG/DL (ref 0.52–1.04)
CREAT SERPL-MCNC: 1.36 MG/DL (ref 0.52–1.04)
CREAT SERPL-MCNC: 1.38 MG/DL (ref 0.52–1.04)
CREAT SERPL-MCNC: 1.39 MG/DL (ref 0.52–1.04)
CREAT SERPL-MCNC: 1.4 MG/DL (ref 0.52–1.04)
CREAT SERPL-MCNC: 1.42 MG/DL (ref 0.52–1.04)
CREAT SERPL-MCNC: 1.43 MG/DL (ref 0.52–1.04)
CREAT SERPL-MCNC: 1.44 MG/DL (ref 0.52–1.04)
CREAT SERPL-MCNC: 1.54 MG/DL (ref 0.52–1.04)
CREAT SERPL-MCNC: 1.58 MG/DL (ref 0.52–1.04)
CREAT SERPL-MCNC: 1.67 MG/DL (ref 0.52–1.04)
CREAT SERPL-MCNC: 2.11 MG/DL (ref 0.52–1.04)
CREAT SERPL-MCNC: 2.46 MG/DL (ref 0.52–1.04)
CREAT SERPL-MCNC: 3 MG/DL (ref 0.52–1.04)
CREAT SERPL-MCNC: 3.44 MG/DL (ref 0.52–1.04)
CRP SERPL-MCNC: 37 MG/L (ref 0–8)
DESIPRAMINE QUAL: NEGATIVE
DEXTROMETHORPHAN: NEGATIVE
DIFFERENTIAL METHOD BLD: ABNORMAL
DIPHENHYDRAMINE: NEGATIVE
DOXEPIN/METABOLITE: NEGATIVE
DOXYLAMINE: NEGATIVE
DPA1* LOCUS NMDP: NORMAL
DPA1* NMDP: NORMAL
DPA1*: NORMAL
DPA1*LOCUS: NORMAL
DPB1* LOCUS NMDP: NORMAL
DPB1* NMDP: NORMAL
DPB1*: NORMAL
DPB1*LOCUS: NORMAL
DQA1*: NORMAL
DQA1*LOCUS: NORMAL
DQB1* LOCUS: NORMAL
DQB1*: NORMAL
DRB1* LOCUS: NORMAL
DRB1*: NORMAL
DRSSO TEST METHOD: NORMAL
EBV EA-D IGG SER-ACNC: >8 AI (ref 0–0.8)
EOSINOPHIL # BLD AUTO: 0 10E9/L (ref 0–0.7)
EOSINOPHIL # BLD AUTO: 0.1 10E9/L (ref 0–0.7)
EOSINOPHIL # BLD AUTO: 0.2 10E9/L (ref 0–0.7)
EOSINOPHIL NFR BLD AUTO: 0.9 %
EOSINOPHIL NFR BLD AUTO: 1.7 %
EOSINOPHIL NFR BLD AUTO: 2.6 %
EPHEDRINE OR PSEUDO: NEGATIVE
ERYTHROCYTE [DISTWIDTH] IN BLOOD BY AUTOMATED COUNT: 14.9 % (ref 10–15)
ERYTHROCYTE [DISTWIDTH] IN BLOOD BY AUTOMATED COUNT: 15 % (ref 10–15)
ERYTHROCYTE [DISTWIDTH] IN BLOOD BY AUTOMATED COUNT: 15.3 % (ref 10–15)
ERYTHROCYTE [DISTWIDTH] IN BLOOD BY AUTOMATED COUNT: 15.4 % (ref 10–15)
ERYTHROCYTE [DISTWIDTH] IN BLOOD BY AUTOMATED COUNT: 15.7 % (ref 10–15)
ERYTHROCYTE [DISTWIDTH] IN BLOOD BY AUTOMATED COUNT: 16.2 % (ref 10–15)
ERYTHROCYTE [DISTWIDTH] IN BLOOD BY AUTOMATED COUNT: 16.5 % (ref 10–15)
ERYTHROCYTE [DISTWIDTH] IN BLOOD BY AUTOMATED COUNT: 16.7 % (ref 10–15)
ERYTHROCYTE [DISTWIDTH] IN BLOOD BY AUTOMATED COUNT: 16.7 % (ref 10–15)
ERYTHROCYTE [DISTWIDTH] IN BLOOD BY AUTOMATED COUNT: 16.9 % (ref 10–15)
ERYTHROCYTE [DISTWIDTH] IN BLOOD BY AUTOMATED COUNT: 17 % (ref 10–15)
ERYTHROCYTE [DISTWIDTH] IN BLOOD BY AUTOMATED COUNT: 17.1 % (ref 10–15)
ERYTHROCYTE [DISTWIDTH] IN BLOOD BY AUTOMATED COUNT: 17.2 % (ref 10–15)
ETHANOL UR QL SCN: NEGATIVE
FENTANYL QUAL: NEGATIVE
FERRITIN SERPL-MCNC: 441 NG/ML (ref 8–252)
FLUOXETINE AND METAB: NEGATIVE
GAMMA INTERFERON BACKGROUND BLD IA-ACNC: 0.02 IU/ML
GFR SERPL CREATININE-BSD FRML MDRD: 13 ML/MIN/{1.73_M2}
GFR SERPL CREATININE-BSD FRML MDRD: 15 ML/MIN/{1.73_M2}
GFR SERPL CREATININE-BSD FRML MDRD: 19 ML/MIN/{1.73_M2}
GFR SERPL CREATININE-BSD FRML MDRD: 23 ML/MIN/{1.73_M2}
GFR SERPL CREATININE-BSD FRML MDRD: 31 ML/MIN/{1.73_M2}
GFR SERPL CREATININE-BSD FRML MDRD: 33 ML/MIN/{1.73_M2}
GFR SERPL CREATININE-BSD FRML MDRD: 34 ML/MIN/{1.73_M2}
GFR SERPL CREATININE-BSD FRML MDRD: 37 ML/MIN/{1.73_M2}
GFR SERPL CREATININE-BSD FRML MDRD: 38 ML/MIN/{1.73_M2}
GFR SERPL CREATININE-BSD FRML MDRD: 39 ML/MIN/{1.73_M2}
GFR SERPL CREATININE-BSD FRML MDRD: 39 ML/MIN/{1.73_M2}
GFR SERPL CREATININE-BSD FRML MDRD: 40 ML/MIN/{1.73_M2}
GFR SERPL CREATININE-BSD FRML MDRD: 41 ML/MIN/{1.73_M2}
GFR SERPL CREATININE-BSD FRML MDRD: 42 ML/MIN/{1.73_M2}
GFR SERPL CREATININE-BSD FRML MDRD: 43 ML/MIN/{1.73_M2}
GFR SERPL CREATININE-BSD FRML MDRD: 44 ML/MIN/{1.73_M2}
GFR SERPL CREATININE-BSD FRML MDRD: 45 ML/MIN/{1.73_M2}
GFR SERPL CREATININE-BSD FRML MDRD: 46 ML/MIN/{1.73_M2}
GFR SERPL CREATININE-BSD FRML MDRD: 50 ML/MIN/{1.73_M2}
GFR SERPL CREATININE-BSD FRML MDRD: 50 ML/MIN/{1.73_M2}
GLUCOSE BLDC GLUCOMTR-MCNC: 101 MG/DL (ref 70–99)
GLUCOSE BLDC GLUCOMTR-MCNC: 130 MG/DL (ref 70–99)
GLUCOSE BLDC GLUCOMTR-MCNC: 133 MG/DL (ref 70–99)
GLUCOSE BLDC GLUCOMTR-MCNC: 133 MG/DL (ref 70–99)
GLUCOSE BLDC GLUCOMTR-MCNC: 157 MG/DL (ref 70–99)
GLUCOSE BLDC GLUCOMTR-MCNC: 168 MG/DL (ref 70–99)
GLUCOSE BLDC GLUCOMTR-MCNC: 169 MG/DL (ref 70–99)
GLUCOSE BLDC GLUCOMTR-MCNC: 200 MG/DL (ref 70–99)
GLUCOSE BLDC GLUCOMTR-MCNC: 364 MG/DL (ref 70–99)
GLUCOSE BLDC GLUCOMTR-MCNC: 80 MG/DL (ref 70–99)
GLUCOSE BLDC GLUCOMTR-MCNC: 83 MG/DL (ref 70–99)
GLUCOSE BLDC GLUCOMTR-MCNC: 90 MG/DL (ref 70–99)
GLUCOSE SERPL-MCNC: 100 MG/DL (ref 70–99)
GLUCOSE SERPL-MCNC: 101 MG/DL (ref 70–99)
GLUCOSE SERPL-MCNC: 102 MG/DL (ref 70–99)
GLUCOSE SERPL-MCNC: 107 MG/DL (ref 70–99)
GLUCOSE SERPL-MCNC: 112 MG/DL (ref 70–99)
GLUCOSE SERPL-MCNC: 115 MG/DL (ref 70–99)
GLUCOSE SERPL-MCNC: 116 MG/DL (ref 70–99)
GLUCOSE SERPL-MCNC: 117 MG/DL (ref 70–99)
GLUCOSE SERPL-MCNC: 121 MG/DL (ref 70–99)
GLUCOSE SERPL-MCNC: 122 MG/DL (ref 70–99)
GLUCOSE SERPL-MCNC: 122 MG/DL (ref 70–99)
GLUCOSE SERPL-MCNC: 124 MG/DL (ref 70–99)
GLUCOSE SERPL-MCNC: 134 MG/DL (ref 70–99)
GLUCOSE SERPL-MCNC: 148 MG/DL (ref 70–99)
GLUCOSE SERPL-MCNC: 149 MG/DL (ref 70–99)
GLUCOSE SERPL-MCNC: 152 MG/DL (ref 70–99)
GLUCOSE SERPL-MCNC: 153 MG/DL (ref 70–99)
GLUCOSE SERPL-MCNC: 154 MG/DL (ref 70–99)
GLUCOSE SERPL-MCNC: 160 MG/DL (ref 70–99)
GLUCOSE SERPL-MCNC: 166 MG/DL (ref 70–99)
GLUCOSE SERPL-MCNC: 168 MG/DL (ref 70–99)
GLUCOSE SERPL-MCNC: 174 MG/DL (ref 70–99)
GLUCOSE SERPL-MCNC: 181 MG/DL (ref 70–99)
GLUCOSE SERPL-MCNC: 72 MG/DL (ref 70–99)
GLUCOSE SERPL-MCNC: 75 MG/DL (ref 70–99)
GLUCOSE SERPL-MCNC: 86 MG/DL (ref 70–99)
GLUCOSE SERPL-MCNC: 86 MG/DL (ref 70–99)
GLUCOSE SERPL-MCNC: 89 MG/DL (ref 70–99)
GLUCOSE SERPL-MCNC: 91 MG/DL (ref 70–99)
GLUCOSE SERPL-MCNC: 94 MG/DL (ref 70–99)
GLUCOSE SERPL-MCNC: 97 MG/DL (ref 70–99)
GLUCOSE SERPL-MCNC: 99 MG/DL (ref 70–99)
GLUCOSE SERPL-MCNC: 99 MG/DL (ref 70–99)
GLUCOSE UR STRIP-MCNC: NEGATIVE MG/DL
HBV CORE AB SERPL QL IA: NONREACTIVE
HBV SURFACE AB SERPL IA-ACNC: 0.55 M[IU]/ML
HBV SURFACE AG SERPL QL IA: NONREACTIVE
HCO3 BLDV-SCNC: 23 MMOL/L (ref 21–28)
HCO3 BLDV-SCNC: 23 MMOL/L (ref 21–28)
HCO3 BLDV-SCNC: 24 MMOL/L (ref 21–28)
HCO3 BLDV-SCNC: 25 MMOL/L (ref 21–28)
HCO3 BLDV-SCNC: 26 MMOL/L (ref 21–28)
HCO3 BLDV-SCNC: 26 MMOL/L (ref 21–28)
HCO3 BLDV-SCNC: 27 MMOL/L (ref 21–28)
HCO3 BLDV-SCNC: 28 MMOL/L (ref 21–28)
HCO3 BLDV-SCNC: 29 MMOL/L (ref 21–28)
HCO3 BLDV-SCNC: 30 MMOL/L (ref 21–28)
HCO3 BLDV-SCNC: 31 MMOL/L (ref 21–28)
HCO3 BLDV-SCNC: 32 MMOL/L (ref 21–28)
HCO3 BLDV-SCNC: 33 MMOL/L (ref 21–28)
HCO3 BLDV-SCNC: 34 MMOL/L (ref 21–28)
HCT VFR BLD AUTO: 27.8 % (ref 35–47)
HCT VFR BLD AUTO: 28.3 % (ref 35–47)
HCT VFR BLD AUTO: 28.4 % (ref 35–47)
HCT VFR BLD AUTO: 28.6 % (ref 35–47)
HCT VFR BLD AUTO: 28.7 % (ref 35–47)
HCT VFR BLD AUTO: 28.7 % (ref 35–47)
HCT VFR BLD AUTO: 29.2 % (ref 35–47)
HCT VFR BLD AUTO: 29.4 % (ref 35–47)
HCT VFR BLD AUTO: 29.4 % (ref 35–47)
HCT VFR BLD AUTO: 29.5 % (ref 35–47)
HCT VFR BLD AUTO: 29.6 % (ref 35–47)
HCT VFR BLD AUTO: 29.7 % (ref 35–47)
HCT VFR BLD AUTO: 30.1 % (ref 35–47)
HCT VFR BLD AUTO: 30.6 % (ref 35–47)
HCV AB SERPL QL IA: NONREACTIVE
HDLC SERPL-MCNC: 77 MG/DL
HEMOCCULT STL QL IA: NEGATIVE
HGB BLD-MCNC: 8.6 G/DL (ref 11.7–15.7)
HGB BLD-MCNC: 8.6 G/DL (ref 11.7–15.7)
HGB BLD-MCNC: 8.7 G/DL (ref 11.7–15.7)
HGB BLD-MCNC: 8.8 G/DL (ref 11.7–15.7)
HGB BLD-MCNC: 8.9 G/DL (ref 11.7–15.7)
HGB BLD-MCNC: 9 G/DL (ref 11.7–15.7)
HGB BLD-MCNC: 9.2 G/DL (ref 11.7–15.7)
HGB BLD-MCNC: 9.4 G/DL (ref 11.7–15.7)
HGB UR QL STRIP: ABNORMAL
HIV 1+2 AB+HIV1 P24 AG SERPL QL IA: NONREACTIVE
HSV1 IGG SERPL QL IA: >8 AI (ref 0–0.8)
HSV2 IGG SERPL QL IA: <0.2 AI (ref 0–0.8)
HYALINE CASTS #/AREA URNS LPF: 6 /LPF (ref 0–2)
HYDROCODONE QUAL: NEGATIVE
HYDROMORPHONE QUAL: NEGATIVE
IBUPROFEN QUAL: NEGATIVE
IMIPRAMINE QUAL: NEGATIVE
INR PPP: 1.4 (ref 0.86–1.14)
INR PPP: 1.46 (ref 0.86–1.14)
INR PPP: 1.52 (ref 0.86–1.14)
INR PPP: 1.85 (ref 0.86–1.14)
INR PPP: 1.88 (ref 0.86–1.14)
INR PPP: 1.96 (ref 0.86–1.14)
INR PPP: 2.06 (ref 0.86–1.14)
INR PPP: 2.18 (ref 0.86–1.14)
INR PPP: 2.35 (ref 0.86–1.14)
INR PPP: 2.43 (ref 0.86–1.14)
INR PPP: 2.66 (ref 0.86–1.14)
INR PPP: 2.72 (ref 0.86–1.14)
INR PPP: 2.77 (ref 0.86–1.14)
INR PPP: 2.93 (ref 0.86–1.14)
INR PPP: 3.38 (ref 0.86–1.14)
INR PPP: 5.03 (ref 0.86–1.14)
INR PPP: 5.19 (ref 0.86–1.14)
INR PPP: 8.97 (ref 0.86–1.14)
INTERPRETATION ECG - MUSE: NORMAL
IRON SATN MFR SERPL: 24 % (ref 15–46)
IRON SERPL-MCNC: 61 UG/DL (ref 35–180)
KCT BLD-ACNC: 119 SEC (ref 75–150)
KCT BLD-ACNC: 127 SEC (ref 75–150)
KCT BLD-ACNC: 144 SEC (ref 75–150)
KCT BLD-ACNC: 164 SEC (ref 75–150)
KETONES UR STRIP-MCNC: NEGATIVE MG/DL
LA PPP-IMP: NEGATIVE
LACTATE BLD-SCNC: 0.6 MMOL/L (ref 0.7–2)
LACTATE BLD-SCNC: 0.9 MMOL/L (ref 0.7–2)
LACTATE BLD-SCNC: 4.5 MMOL/L (ref 0.7–2)
LACTATE BLD-SCNC: 4.5 MMOL/L (ref 0.7–2)
LAMOTRIGINE QUAL: NEGATIVE
LDH SERPL L TO P-CCNC: 303 U/L (ref 81–234)
LDLC SERPL CALC-MCNC: 77 MG/DL
LEUKOCYTE ESTERASE UR QL STRIP: ABNORMAL
LMWH PPP CHRO-ACNC: 0.12 IU/ML
LMWH PPP CHRO-ACNC: 0.18 IU/ML
LMWH PPP CHRO-ACNC: 0.18 IU/ML
LMWH PPP CHRO-ACNC: 0.19 IU/ML
LMWH PPP CHRO-ACNC: 0.19 IU/ML
LMWH PPP CHRO-ACNC: 0.2 IU/ML
LMWH PPP CHRO-ACNC: 0.21 IU/ML
LMWH PPP CHRO-ACNC: 0.22 IU/ML
LMWH PPP CHRO-ACNC: 0.23 IU/ML
LMWH PPP CHRO-ACNC: 0.24 IU/ML
LMWH PPP CHRO-ACNC: 0.25 IU/ML
LMWH PPP CHRO-ACNC: 0.26 IU/ML
LMWH PPP CHRO-ACNC: 0.31 IU/ML
LMWH PPP CHRO-ACNC: 0.32 IU/ML
LMWH PPP CHRO-ACNC: 0.36 IU/ML
LMWH PPP CHRO-ACNC: <0.1 IU/ML
LMWH PPP CHRO-ACNC: <0.1 IU/ML
LOXAPINE: NEGATIVE
LYMPHOCYTES # BLD AUTO: 0.8 10E9/L (ref 0.8–5.3)
LYMPHOCYTES # BLD AUTO: 0.9 10E9/L (ref 0.8–5.3)
LYMPHOCYTES # BLD AUTO: 1.4 10E9/L (ref 0.8–5.3)
LYMPHOCYTES # BLD AUTO: 1.5 10E9/L (ref 0.8–5.3)
LYMPHOCYTES # BLD AUTO: 1.5 10E9/L (ref 0.8–5.3)
LYMPHOCYTES # BLD AUTO: 1.7 10E9/L (ref 0.8–5.3)
LYMPHOCYTES # BLD AUTO: 1.9 10E9/L (ref 0.8–5.3)
LYMPHOCYTES NFR BLD AUTO: 10.5 %
LYMPHOCYTES NFR BLD AUTO: 12.1 %
LYMPHOCYTES NFR BLD AUTO: 14.9 %
LYMPHOCYTES NFR BLD AUTO: 15.6 %
LYMPHOCYTES NFR BLD AUTO: 17.4 %
LYMPHOCYTES NFR BLD AUTO: 18.4 %
LYMPHOCYTES NFR BLD AUTO: 18.4 %
M TB IFN-G BLD-IMP: NEGATIVE
M TB IFN-G CD4+ BCKGRND COR BLD-ACNC: 8.74 IU/ML
MAGNESIUM SERPL-MCNC: 1.9 MG/DL (ref 1.6–2.3)
MAGNESIUM SERPL-MCNC: 2 MG/DL (ref 1.6–2.3)
MAGNESIUM SERPL-MCNC: 2.1 MG/DL (ref 1.6–2.3)
MAGNESIUM SERPL-MCNC: 2.2 MG/DL (ref 1.6–2.3)
MAGNESIUM SERPL-MCNC: 2.3 MG/DL (ref 1.6–2.3)
MAGNESIUM SERPL-MCNC: 2.4 MG/DL (ref 1.6–2.3)
MAGNESIUM SERPL-MCNC: 2.4 MG/DL (ref 1.6–2.3)
MAGNESIUM SERPL-MCNC: 2.5 MG/DL (ref 1.6–2.3)
MAGNESIUM SERPL-MCNC: 2.6 MG/DL (ref 1.6–2.3)
MAGNESIUM SERPL-MCNC: 2.6 MG/DL (ref 1.6–2.3)
MAGNESIUM SERPL-MCNC: 2.8 MG/DL (ref 1.6–2.3)
MAGNESIUM SERPL-MCNC: 2.9 MG/DL (ref 1.6–2.3)
MAPROTYLINE: NEGATIVE
MCH RBC QN AUTO: 28.6 PG (ref 26.5–33)
MCH RBC QN AUTO: 28.7 PG (ref 26.5–33)
MCH RBC QN AUTO: 28.8 PG (ref 26.5–33)
MCH RBC QN AUTO: 28.8 PG (ref 26.5–33)
MCH RBC QN AUTO: 28.9 PG (ref 26.5–33)
MCH RBC QN AUTO: 29 PG (ref 26.5–33)
MCH RBC QN AUTO: 29.1 PG (ref 26.5–33)
MCH RBC QN AUTO: 29.1 PG (ref 26.5–33)
MCH RBC QN AUTO: 29.2 PG (ref 26.5–33)
MCH RBC QN AUTO: 29.3 PG (ref 26.5–33)
MCH RBC QN AUTO: 29.3 PG (ref 26.5–33)
MCH RBC QN AUTO: 29.5 PG (ref 26.5–33)
MCH RBC QN AUTO: 29.5 PG (ref 26.5–33)
MCH RBC QN AUTO: 29.8 PG (ref 26.5–33)
MCHC RBC AUTO-ENTMCNC: 30.1 G/DL (ref 31.5–36.5)
MCHC RBC AUTO-ENTMCNC: 30.2 G/DL (ref 31.5–36.5)
MCHC RBC AUTO-ENTMCNC: 30.2 G/DL (ref 31.5–36.5)
MCHC RBC AUTO-ENTMCNC: 30.3 G/DL (ref 31.5–36.5)
MCHC RBC AUTO-ENTMCNC: 30.4 G/DL (ref 31.5–36.5)
MCHC RBC AUTO-ENTMCNC: 30.4 G/DL (ref 31.5–36.5)
MCHC RBC AUTO-ENTMCNC: 30.5 G/DL (ref 31.5–36.5)
MCHC RBC AUTO-ENTMCNC: 30.5 G/DL (ref 31.5–36.5)
MCHC RBC AUTO-ENTMCNC: 30.6 G/DL (ref 31.5–36.5)
MCHC RBC AUTO-ENTMCNC: 30.7 G/DL (ref 31.5–36.5)
MCHC RBC AUTO-ENTMCNC: 30.9 G/DL (ref 31.5–36.5)
MCHC RBC AUTO-ENTMCNC: 31 G/DL (ref 31.5–36.5)
MCV RBC AUTO: 93 FL (ref 78–100)
MCV RBC AUTO: 94 FL (ref 78–100)
MCV RBC AUTO: 95 FL (ref 78–100)
MCV RBC AUTO: 96 FL (ref 78–100)
MCV RBC AUTO: 97 FL (ref 78–100)
MDMA QUAL: NEGATIVE
MEPERIDINE QUAL: NEGATIVE
METAMYELOCYTES # BLD: 0.1 10E9/L
METAMYELOCYTES # BLD: 0.1 10E9/L
METAMYELOCYTES # BLD: 0.2 10E9/L
METAMYELOCYTES # BLD: 0.2 10E9/L
METAMYELOCYTES # BLD: 0.5 10E9/L
METAMYELOCYTES NFR BLD MANUAL: 0.9 %
METAMYELOCYTES NFR BLD MANUAL: 1.7 %
METAMYELOCYTES NFR BLD MANUAL: 1.8 %
METAMYELOCYTES NFR BLD MANUAL: 2.6 %
METAMYELOCYTES NFR BLD MANUAL: 6.1 %
METHAMPHETAMINE: NEGATIVE
METHODONE QUAL: NEGATIVE
MITOGEN IGNF BCKGRD COR BLD-ACNC: 0 IU/ML
MITOGEN IGNF BCKGRD COR BLD-ACNC: 0.01 IU/ML
MONOCYTES # BLD AUTO: 0 10E9/L (ref 0–1.3)
MONOCYTES # BLD AUTO: 0 10E9/L (ref 0–1.3)
MONOCYTES # BLD AUTO: 0.2 10E9/L (ref 0–1.3)
MONOCYTES # BLD AUTO: 0.2 10E9/L (ref 0–1.3)
MONOCYTES # BLD AUTO: 0.5 10E9/L (ref 0–1.3)
MONOCYTES # BLD AUTO: 0.6 10E9/L (ref 0–1.3)
MONOCYTES # BLD AUTO: 0.6 10E9/L (ref 0–1.3)
MONOCYTES NFR BLD AUTO: 0 %
MONOCYTES NFR BLD AUTO: 0 %
MONOCYTES NFR BLD AUTO: 1.7 %
MONOCYTES NFR BLD AUTO: 2.6 %
MONOCYTES NFR BLD AUTO: 5.3 %
MONOCYTES NFR BLD AUTO: 5.3 %
MONOCYTES NFR BLD AUTO: 6.1 %
MORPHINE QUAL: NEGATIVE
MUCOUS THREADS #/AREA URNS LPF: PRESENT /LPF
MYELOCYTES # BLD: 0.2 10E9/L
MYELOCYTES # BLD: 0.4 10E9/L
MYELOCYTES # BLD: 0.4 10E9/L
MYELOCYTES # BLD: 0.9 10E9/L
MYELOCYTES NFR BLD MANUAL: 1.7 %
MYELOCYTES NFR BLD MANUAL: 10.5 %
MYELOCYTES NFR BLD MANUAL: 2.6 %
MYELOCYTES NFR BLD MANUAL: 3.4 %
MYELOCYTES NFR BLD MANUAL: 4.3 %
MYELOCYTES NFR BLD MANUAL: 4.4 %
NEUTROPHILS # BLD AUTO: 5.2 10E9/L (ref 1.6–8.3)
NEUTROPHILS # BLD AUTO: 5.8 10E9/L (ref 1.6–8.3)
NEUTROPHILS # BLD AUTO: 6.3 10E9/L (ref 1.6–8.3)
NEUTROPHILS # BLD AUTO: 6.9 10E9/L (ref 1.6–8.3)
NEUTROPHILS # BLD AUTO: 7 10E9/L (ref 1.6–8.3)
NEUTROPHILS # BLD AUTO: 7.4 10E9/L (ref 1.6–8.3)
NEUTROPHILS # BLD AUTO: 7.5 10E9/L (ref 1.6–8.3)
NEUTROPHILS NFR BLD AUTO: 64.1 %
NEUTROPHILS NFR BLD AUTO: 71 %
NEUTROPHILS NFR BLD AUTO: 71.3 %
NEUTROPHILS NFR BLD AUTO: 73.1 %
NEUTROPHILS NFR BLD AUTO: 79.3 %
NEUTROPHILS NFR BLD AUTO: 80.7 %
NEUTROPHILS NFR BLD AUTO: 80.7 %
NICOTINE: NEGATIVE
NITRATE UR QL: NEGATIVE
NONHDLC SERPL-MCNC: 91 MG/DL
NORTRIPTYLINE QUAL: NEGATIVE
NRBC # BLD AUTO: 0.1 10*3/UL
NRBC BLD AUTO-RTO: 1 /100
NT-PROBNP SERPL-MCNC: ABNORMAL PG/ML (ref 0–900)
O2/TOTAL GAS SETTING VFR VENT: 21 %
O2/TOTAL GAS SETTING VFR VENT: ABNORMAL %
O2/TOTAL GAS SETTING VFR VENT: NORMAL %
O2/TOTAL GAS SETTING VFR VENT: NORMAL %
OLANZAPINE QUAL: NEGATIVE
OPIATES UR QL SCN: NEGATIVE
OVALOCYTES BLD QL SMEAR: SLIGHT
OXYCODONE QUAL: NEGATIVE
OXYHGB MFR BLDA: 46 % (ref 75–100)
OXYHGB MFR BLDV: 24 %
OXYHGB MFR BLDV: 32 %
OXYHGB MFR BLDV: 32 %
OXYHGB MFR BLDV: 35 %
OXYHGB MFR BLDV: 37 %
OXYHGB MFR BLDV: 37 %
OXYHGB MFR BLDV: 39 %
OXYHGB MFR BLDV: 40 %
OXYHGB MFR BLDV: 41 %
OXYHGB MFR BLDV: 42 %
OXYHGB MFR BLDV: 43 %
OXYHGB MFR BLDV: 43 %
OXYHGB MFR BLDV: 44 %
OXYHGB MFR BLDV: 45 %
OXYHGB MFR BLDV: 46 %
OXYHGB MFR BLDV: 46 %
OXYHGB MFR BLDV: 47 %
OXYHGB MFR BLDV: 48 %
OXYHGB MFR BLDV: 48 %
OXYHGB MFR BLDV: 49 %
OXYHGB MFR BLDV: 49 %
OXYHGB MFR BLDV: 50 %
OXYHGB MFR BLDV: 52 %
OXYHGB MFR BLDV: 54 %
OXYHGB MFR BLDV: 55 %
OXYHGB MFR BLDV: 56 %
OXYHGB MFR BLDV: 56 %
OXYHGB MFR BLDV: 57 %
OXYHGB MFR BLDV: 57 %
OXYHGB MFR BLDV: 58 %
OXYHGB MFR BLDV: 59 %
OXYHGB MFR BLDV: 59 %
OXYHGB MFR BLDV: 62 %
OXYHGB MFR BLDV: 62 %
OXYHGB MFR BLDV: 63 %
OXYHGB MFR BLDV: 65 %
OXYHGB MFR BLDV: 65 %
OXYHGB MFR BLDV: 66 %
OXYHGB MFR BLDV: 66 %
OXYHGB MFR BLDV: 68 %
OXYHGB MFR BLDV: 72 %
PAP: NORMAL
PCO2 BLDV: 37 MM HG (ref 40–50)
PCO2 BLDV: 38 MM HG (ref 40–50)
PCO2 BLDV: 38 MM HG (ref 40–50)
PCO2 BLDV: 40 MM HG (ref 40–50)
PCO2 BLDV: 41 MM HG (ref 40–50)
PCO2 BLDV: 43 MM HG (ref 40–50)
PCO2 BLDV: 44 MM HG (ref 40–50)
PCO2 BLDV: 46 MM HG (ref 40–50)
PCO2 BLDV: 47 MM HG (ref 40–50)
PCO2 BLDV: 47 MM HG (ref 40–50)
PCO2 BLDV: 48 MM HG (ref 40–50)
PCO2 BLDV: 49 MM HG (ref 40–50)
PCO2 BLDV: 50 MM HG (ref 40–50)
PCO2 BLDV: 51 MM HG (ref 40–50)
PCO2 BLDV: 52 MM HG (ref 40–50)
PCO2 BLDV: 53 MM HG (ref 40–50)
PCO2 BLDV: 54 MM HG (ref 40–50)
PCO2 BLDV: 55 MM HG (ref 40–50)
PCO2 BLDV: 56 MM HG (ref 40–50)
PCO2 BLDV: 57 MM HG (ref 40–50)
PCO2 BLDV: 57 MM HG (ref 40–50)
PENTAZOCINE: NEGATIVE
PH BLDV: 7.34 PH (ref 7.32–7.43)
PH BLDV: 7.36 PH (ref 7.32–7.43)
PH BLDV: 7.36 PH (ref 7.32–7.43)
PH BLDV: 7.37 PH (ref 7.32–7.43)
PH BLDV: 7.38 PH (ref 7.32–7.43)
PH BLDV: 7.39 PH (ref 7.32–7.43)
PH BLDV: 7.4 PH (ref 7.32–7.43)
PH BLDV: 7.41 PH (ref 7.32–7.43)
PH BLDV: 7.42 PH (ref 7.32–7.43)
PH BLDV: 7.43 PH (ref 7.32–7.43)
PH BLDV: 7.43 PH (ref 7.32–7.43)
PH UR STRIP: 5 PH (ref 5–7)
PHENCYCLIDINE QUAL: NEGATIVE
PHENMETRAZINE: NEGATIVE
PHENTERMINE: NEGATIVE
PHENYLBUTAZONE: NEGATIVE
PHENYLPROPANOLAMINE: NEGATIVE
PHOSPHATE SERPL-MCNC: 2.5 MG/DL (ref 2.5–4.5)
PHOSPHATE SERPL-MCNC: 2.6 MG/DL (ref 2.5–4.5)
PHOSPHATE SERPL-MCNC: 2.7 MG/DL (ref 2.5–4.5)
PHOSPHATE SERPL-MCNC: 3.1 MG/DL (ref 2.5–4.5)
PHOSPHATE SERPL-MCNC: 3.1 MG/DL (ref 2.5–4.5)
PHOSPHATE SERPL-MCNC: 3.3 MG/DL (ref 2.5–4.5)
PHOSPHATE SERPL-MCNC: 3.6 MG/DL (ref 2.5–4.5)
PHOSPHATE SERPL-MCNC: 3.6 MG/DL (ref 2.5–4.5)
PHOSPHATE SERPL-MCNC: 3.9 MG/DL (ref 2.5–4.5)
PHOSPHATE SERPL-MCNC: 4.1 MG/DL (ref 2.5–4.5)
PHOSPHATE SERPL-MCNC: 5 MG/DL (ref 2.5–4.5)
PLATELET # BLD AUTO: 179 10E9/L (ref 150–450)
PLATELET # BLD AUTO: 218 10E9/L (ref 150–450)
PLATELET # BLD AUTO: 219 10E9/L (ref 150–450)
PLATELET # BLD AUTO: 237 10E9/L (ref 150–450)
PLATELET # BLD AUTO: 237 10E9/L (ref 150–450)
PLATELET # BLD AUTO: 241 10E9/L (ref 150–450)
PLATELET # BLD AUTO: 244 10E9/L (ref 150–450)
PLATELET # BLD AUTO: 248 10E9/L (ref 150–450)
PLATELET # BLD AUTO: 252 10E9/L (ref 150–450)
PLATELET # BLD AUTO: 261 10E9/L (ref 150–450)
PLATELET # BLD AUTO: 265 10E9/L (ref 150–450)
PLATELET # BLD AUTO: 266 10E9/L (ref 150–450)
PLATELET # BLD AUTO: 289 10E9/L (ref 150–450)
PLATELET # BLD AUTO: 297 10E9/L (ref 150–450)
PLATELET # BLD AUTO: 320 10E9/L (ref 150–450)
PLATELET # BLD AUTO: 325 10E9/L (ref 150–450)
PLATELET # BLD EST: ABNORMAL 10*3/UL
PO2 BLDV: 19 MM HG (ref 25–47)
PO2 BLDV: 21 MM HG (ref 25–47)
PO2 BLDV: 22 MM HG (ref 25–47)
PO2 BLDV: 23 MM HG (ref 25–47)
PO2 BLDV: 23 MM HG (ref 25–47)
PO2 BLDV: 24 MM HG (ref 25–47)
PO2 BLDV: 25 MM HG (ref 25–47)
PO2 BLDV: 26 MM HG (ref 25–47)
PO2 BLDV: 27 MM HG (ref 25–47)
PO2 BLDV: 28 MM HG (ref 25–47)
PO2 BLDV: 28 MM HG (ref 25–47)
PO2 BLDV: 29 MM HG (ref 25–47)
PO2 BLDV: 30 MM HG (ref 25–47)
PO2 BLDV: 30 MM HG (ref 25–47)
PO2 BLDV: 31 MM HG (ref 25–47)
PO2 BLDV: 31 MM HG (ref 25–47)
PO2 BLDV: 32 MM HG (ref 25–47)
PO2 BLDV: 33 MM HG (ref 25–47)
PO2 BLDV: 34 MM HG (ref 25–47)
PO2 BLDV: 35 MM HG (ref 25–47)
PO2 BLDV: 36 MM HG (ref 25–47)
PO2 BLDV: 36 MM HG (ref 25–47)
PO2 BLDV: 37 MM HG (ref 25–47)
PO2 BLDV: 38 MM HG (ref 25–47)
PO2 BLDV: 38 MM HG (ref 25–47)
PO2 BLDV: 40 MM HG (ref 25–47)
POIKILOCYTOSIS BLD QL SMEAR: SLIGHT
POTASSIUM BLD-SCNC: 3.5 MMOL/L (ref 3.4–5.3)
POTASSIUM BLD-SCNC: 4 MMOL/L (ref 3.4–5.3)
POTASSIUM BLD-SCNC: 4.2 MMOL/L (ref 3.4–5.3)
POTASSIUM BLD-SCNC: 4.5 MMOL/L (ref 3.4–5.3)
POTASSIUM BLD-SCNC: 4.7 MMOL/L (ref 3.4–5.3)
POTASSIUM SERPL-SCNC: 3.3 MMOL/L (ref 3.4–5.3)
POTASSIUM SERPL-SCNC: 3.6 MMOL/L (ref 3.4–5.3)
POTASSIUM SERPL-SCNC: 3.8 MMOL/L (ref 3.4–5.3)
POTASSIUM SERPL-SCNC: 3.8 MMOL/L (ref 3.4–5.3)
POTASSIUM SERPL-SCNC: 3.9 MMOL/L (ref 3.4–5.3)
POTASSIUM SERPL-SCNC: 4 MMOL/L (ref 3.4–5.3)
POTASSIUM SERPL-SCNC: 4.1 MMOL/L (ref 3.4–5.3)
POTASSIUM SERPL-SCNC: 4.2 MMOL/L (ref 3.4–5.3)
POTASSIUM SERPL-SCNC: 4.3 MMOL/L (ref 3.4–5.3)
POTASSIUM SERPL-SCNC: 4.4 MMOL/L (ref 3.4–5.3)
POTASSIUM SERPL-SCNC: 4.5 MMOL/L (ref 3.4–5.3)
POTASSIUM SERPL-SCNC: 4.6 MMOL/L (ref 3.4–5.3)
POTASSIUM SERPL-SCNC: 4.8 MMOL/L (ref 3.4–5.3)
POTASSIUM SERPL-SCNC: 4.8 MMOL/L (ref 3.4–5.3)
POTASSIUM SERPL-SCNC: 4.9 MMOL/L (ref 3.4–5.3)
POTASSIUM SERPL-SCNC: 5 MMOL/L (ref 3.4–5.3)
POTASSIUM SERPL-SCNC: 5.1 MMOL/L (ref 3.4–5.3)
POTASSIUM SERPL-SCNC: 5.3 MMOL/L (ref 3.4–5.3)
POTASSIUM SERPL-SCNC: 5.7 MMOL/L (ref 3.4–5.3)
POTASSIUM SERPL-SCNC: 5.9 MMOL/L (ref 3.4–5.3)
PREALB SERPL IA-MCNC: 18 MG/DL (ref 15–45)
PROPOXPHENE QUAL: NEGATIVE
PROPRANOLOL QUAL: NEGATIVE
PROT SERPL-MCNC: 6.8 G/DL (ref 6.8–8.8)
PROT SERPL-MCNC: 7.1 G/DL (ref 6.8–8.8)
PROT SERPL-MCNC: 7.3 G/DL (ref 6.8–8.8)
PYRILAMINE: NEGATIVE
RBC # BLD AUTO: 2.97 10E12/L (ref 3.8–5.2)
RBC # BLD AUTO: 2.98 10E12/L (ref 3.8–5.2)
RBC # BLD AUTO: 2.98 10E12/L (ref 3.8–5.2)
RBC # BLD AUTO: 3.02 10E12/L (ref 3.8–5.2)
RBC # BLD AUTO: 3.02 10E12/L (ref 3.8–5.2)
RBC # BLD AUTO: 3.03 10E12/L (ref 3.8–5.2)
RBC # BLD AUTO: 3.04 10E12/L (ref 3.8–5.2)
RBC # BLD AUTO: 3.06 10E12/L (ref 3.8–5.2)
RBC # BLD AUTO: 3.07 10E12/L (ref 3.8–5.2)
RBC # BLD AUTO: 3.07 10E12/L (ref 3.8–5.2)
RBC # BLD AUTO: 3.08 10E12/L (ref 3.8–5.2)
RBC # BLD AUTO: 3.08 10E12/L (ref 3.8–5.2)
RBC # BLD AUTO: 3.09 10E12/L (ref 3.8–5.2)
RBC # BLD AUTO: 3.09 10E12/L (ref 3.8–5.2)
RBC # BLD AUTO: 3.1 10E12/L (ref 3.8–5.2)
RBC # BLD AUTO: 3.12 10E12/L (ref 3.8–5.2)
RBC # BLD AUTO: 3.12 10E12/L (ref 3.8–5.2)
RBC # BLD AUTO: 3.29 10E12/L (ref 3.8–5.2)
RBC #/AREA URNS AUTO: 25 /HPF (ref 0–2)
RBC MORPH BLD: NORMAL
SA1 CELL: NORMAL
SA1 COMMENTS: NORMAL
SA1 HI RISK ABY: NORMAL
SA1 MOD RISK ABY: NORMAL
SA1 TEST METHOD: NORMAL
SA2 CELL: NORMAL
SA2 COMMENTS: NORMAL
SA2 HI RISK ABY UA: NORMAL
SA2 MOD RISK ABY: NORMAL
SA2 TEST METHOD: NORMAL
SALICYLATE QUAL: NEGATIVE
SODIUM SERPL-SCNC: 132 MMOL/L (ref 133–144)
SODIUM SERPL-SCNC: 133 MMOL/L (ref 133–144)
SODIUM SERPL-SCNC: 134 MMOL/L (ref 133–144)
SODIUM SERPL-SCNC: 135 MMOL/L (ref 133–144)
SODIUM SERPL-SCNC: 136 MMOL/L (ref 133–144)
SODIUM SERPL-SCNC: 137 MMOL/L (ref 133–144)
SODIUM SERPL-SCNC: 138 MMOL/L (ref 133–144)
SODIUM SERPL-SCNC: 138 MMOL/L (ref 133–144)
SOURCE: ABNORMAL
SP GR UR STRIP: 1.01 (ref 1–1.03)
SPECIMEN EXP DATE BLD: NORMAL
SPECIMEN SOURCE: ABNORMAL
T GONDII IGG SER QL: <3 IU/ML (ref 0–7.1)
T PALLIDUM AB SER QL: NONREACTIVE
T3 SERPL-MCNC: 74 NG/DL (ref 60–181)
T3FREE SERPL-MCNC: 1.9 PG/ML (ref 2.3–4.2)
T4 FREE SERPL-MCNC: 1.44 NG/DL (ref 0.76–1.46)
T4 FREE SERPL-MCNC: 1.48 NG/DL (ref 0.76–1.46)
T4 FREE SERPL-MCNC: 1.57 NG/DL (ref 0.76–1.46)
T4 SERPL-MCNC: 14.5 UG/DL (ref 4.5–13.9)
THEOBROMINE: NEGATIVE
THYROGLOB AB SERPL IA-ACNC: <20 IU/ML (ref 0–40)
THYROPEROXIDASE AB SERPL-ACNC: 14 IU/ML
TIBC SERPL-MCNC: 252 UG/DL (ref 240–430)
TOPIRAMATE QUAL: NEGATIVE
TRANS CELLS #/AREA URNS HPF: <1 /HPF (ref 0–1)
TRANSFERRIN SERPL-MCNC: 218 MG/DL (ref 210–360)
TRIGL SERPL-MCNC: 71 MG/DL
TRIMIPRAMINE QUAL: NEGATIVE
TSH SERPL DL<=0.005 MIU/L-ACNC: 3.8 MU/L (ref 0.4–4)
TSH SERPL DL<=0.005 MIU/L-ACNC: 5.43 MU/L (ref 0.4–4)
TSH SERPL DL<=0.005 MIU/L-ACNC: 5.6 MU/L (ref 0.4–4)
TSH SERPL DL<=0.005 MIU/L-ACNC: 6.55 MU/L (ref 0.4–4)
TSI SER-ACNC: <1 TSI INDEX
UNACCEPTABLE ANTIGEN: NORMAL
UNOS CPRA: 87
URATE CRY #/AREA URNS HPF: ABNORMAL /HPF
URATE SERPL-MCNC: 6.2 MG/DL (ref 2.6–6)
UROBILINOGEN UR STRIP-MCNC: NORMAL MG/DL (ref 0–2)
VENLAFAXINE QUAL: NEGATIVE
VIT B12 SERPL-MCNC: 763 PG/ML (ref 193–986)
VZV IGG SER QL IA: 2.1 AI (ref 0–0.8)
WBC # BLD AUTO: 10.5 10E9/L (ref 4–11)
WBC # BLD AUTO: 13.1 10E9/L (ref 4–11)
WBC # BLD AUTO: 13.5 10E9/L (ref 4–11)
WBC # BLD AUTO: 5.1 10E9/L (ref 4–11)
WBC # BLD AUTO: 5.3 10E9/L (ref 4–11)
WBC # BLD AUTO: 5.8 10E9/L (ref 4–11)
WBC # BLD AUTO: 6 10E9/L (ref 4–11)
WBC # BLD AUTO: 6.1 10E9/L (ref 4–11)
WBC # BLD AUTO: 6.1 10E9/L (ref 4–11)
WBC # BLD AUTO: 6.5 10E9/L (ref 4–11)
WBC # BLD AUTO: 6.5 10E9/L (ref 4–11)
WBC # BLD AUTO: 7.6 10E9/L (ref 4–11)
WBC # BLD AUTO: 8.7 10E9/L (ref 4–11)
WBC # BLD AUTO: 8.9 10E9/L (ref 4–11)
WBC # BLD AUTO: 9 10E9/L (ref 4–11)
WBC # BLD AUTO: 9 10E9/L (ref 4–11)
WBC # BLD AUTO: 9.2 10E9/L (ref 4–11)
WBC # BLD AUTO: 9.6 10E9/L (ref 4–11)
WBC #/AREA URNS AUTO: 35 /HPF (ref 0–5)
WBC CLUMPS #/AREA URNS HPF: PRESENT /HPF

## 2019-01-01 PROCEDURE — 25000128 H RX IP 250 OP 636: Performed by: STUDENT IN AN ORGANIZED HEALTH CARE EDUCATION/TRAINING PROGRAM

## 2019-01-01 PROCEDURE — 25000132 ZZH RX MED GY IP 250 OP 250 PS 637: Mod: GY | Performed by: INTERNAL MEDICINE

## 2019-01-01 PROCEDURE — 85025 COMPLETE CBC W/AUTO DIFF WBC: CPT | Performed by: STUDENT IN AN ORGANIZED HEALTH CARE EDUCATION/TRAINING PROGRAM

## 2019-01-01 PROCEDURE — A9270 NON-COVERED ITEM OR SERVICE: HCPCS | Mod: GY | Performed by: STUDENT IN AN ORGANIZED HEALTH CARE EDUCATION/TRAINING PROGRAM

## 2019-01-01 PROCEDURE — 86663 EPSTEIN-BARR ANTIBODY: CPT | Performed by: STUDENT IN AN ORGANIZED HEALTH CARE EDUCATION/TRAINING PROGRAM

## 2019-01-01 PROCEDURE — 20000004 ZZH R&B ICU UMMC

## 2019-01-01 PROCEDURE — 99233 SBSQ HOSP IP/OBS HIGH 50: CPT | Mod: GC | Performed by: INTERNAL MEDICINE

## 2019-01-01 PROCEDURE — 84445 ASSAY OF TSI GLOBULIN: CPT | Performed by: INTERNAL MEDICINE

## 2019-01-01 PROCEDURE — 83735 ASSAY OF MAGNESIUM: CPT | Performed by: STUDENT IN AN ORGANIZED HEALTH CARE EDUCATION/TRAINING PROGRAM

## 2019-01-01 PROCEDURE — 82274 ASSAY TEST FOR BLOOD FECAL: CPT | Performed by: INTERNAL MEDICINE

## 2019-01-01 PROCEDURE — 86644 CMV ANTIBODY: CPT | Performed by: STUDENT IN AN ORGANIZED HEALTH CARE EDUCATION/TRAINING PROGRAM

## 2019-01-01 PROCEDURE — 80048 BASIC METABOLIC PNL TOTAL CA: CPT | Performed by: STUDENT IN AN ORGANIZED HEALTH CARE EDUCATION/TRAINING PROGRAM

## 2019-01-01 PROCEDURE — 25000132 ZZH RX MED GY IP 250 OP 250 PS 637: Mod: GY | Performed by: STUDENT IN AN ORGANIZED HEALTH CARE EDUCATION/TRAINING PROGRAM

## 2019-01-01 PROCEDURE — 82728 ASSAY OF FERRITIN: CPT | Performed by: STUDENT IN AN ORGANIZED HEALTH CARE EDUCATION/TRAINING PROGRAM

## 2019-01-01 PROCEDURE — 74176 CT ABD & PELVIS W/O CONTRAST: CPT

## 2019-01-01 PROCEDURE — A9270 NON-COVERED ITEM OR SERVICE: HCPCS | Mod: GY | Performed by: INTERNAL MEDICINE

## 2019-01-01 PROCEDURE — 36592 COLLECT BLOOD FROM PICC: CPT | Performed by: INTERNAL MEDICINE

## 2019-01-01 PROCEDURE — 99238 HOSP IP/OBS DSCHRG MGMT 30/<: CPT | Mod: GC | Performed by: INTERNAL MEDICINE

## 2019-01-01 PROCEDURE — 85520 HEPARIN ASSAY: CPT | Performed by: STUDENT IN AN ORGANIZED HEALTH CARE EDUCATION/TRAINING PROGRAM

## 2019-01-01 PROCEDURE — 87389 HIV-1 AG W/HIV-1&-2 AB AG IA: CPT | Performed by: STUDENT IN AN ORGANIZED HEALTH CARE EDUCATION/TRAINING PROGRAM

## 2019-01-01 PROCEDURE — 80320 DRUG SCREEN QUANTALCOHOLS: CPT | Performed by: INTERNAL MEDICINE

## 2019-01-01 PROCEDURE — 25000128 H RX IP 250 OP 636

## 2019-01-01 PROCEDURE — 84132 ASSAY OF SERUM POTASSIUM: CPT | Performed by: INTERNAL MEDICINE

## 2019-01-01 PROCEDURE — 36592 COLLECT BLOOD FROM PICC: CPT | Performed by: STUDENT IN AN ORGANIZED HEALTH CARE EDUCATION/TRAINING PROGRAM

## 2019-01-01 PROCEDURE — 82330 ASSAY OF CALCIUM: CPT | Performed by: INTERNAL MEDICINE

## 2019-01-01 PROCEDURE — 80048 BASIC METABOLIC PNL TOTAL CA: CPT | Performed by: INTERNAL MEDICINE

## 2019-01-01 PROCEDURE — 40000014 ZZH STATISTIC ARTERIAL MONITORING DAILY

## 2019-01-01 PROCEDURE — 86850 RBC ANTIBODY SCREEN: CPT | Performed by: INTERNAL MEDICINE

## 2019-01-01 PROCEDURE — 12000004 ZZH R&B IMCU UMMC

## 2019-01-01 PROCEDURE — 25000128 H RX IP 250 OP 636: Performed by: INTERNAL MEDICINE

## 2019-01-01 PROCEDURE — 83605 ASSAY OF LACTIC ACID: CPT | Performed by: INTERNAL MEDICINE

## 2019-01-01 PROCEDURE — 85610 PROTHROMBIN TIME: CPT | Performed by: INTERNAL MEDICINE

## 2019-01-01 PROCEDURE — 40000076 ZZH STATISTIC IABP MONITORING

## 2019-01-01 PROCEDURE — 40000196 ZZH STATISTIC RAPCV CVP MONITORING

## 2019-01-01 PROCEDURE — 40000048 ZZH STATISTIC DAILY SWAN MONITORING

## 2019-01-01 PROCEDURE — 83880 ASSAY OF NATRIURETIC PEPTIDE: CPT | Performed by: STUDENT IN AN ORGANIZED HEALTH CARE EDUCATION/TRAINING PROGRAM

## 2019-01-01 PROCEDURE — 83735 ASSAY OF MAGNESIUM: CPT | Performed by: INTERNAL MEDICINE

## 2019-01-01 PROCEDURE — 84100 ASSAY OF PHOSPHORUS: CPT | Performed by: STUDENT IN AN ORGANIZED HEALTH CARE EDUCATION/TRAINING PROGRAM

## 2019-01-01 PROCEDURE — G0145 SCR C/V CYTO,THINLAYER,RESCR: HCPCS | Performed by: INTERNAL MEDICINE

## 2019-01-01 PROCEDURE — 40000275 ZZH STATISTIC RCP TIME EA 10 MIN

## 2019-01-01 PROCEDURE — C1894 INTRO/SHEATH, NON-LASER: HCPCS | Performed by: INTERNAL MEDICINE

## 2019-01-01 PROCEDURE — 82805 BLOOD GASES W/O2 SATURATION: CPT | Performed by: INTERNAL MEDICINE

## 2019-01-01 PROCEDURE — 25000132 ZZH RX MED GY IP 250 OP 250 PS 637: Mod: GY

## 2019-01-01 PROCEDURE — 40000358 ZZHCL STATISTIC DRUG SCREEN MULTIPLE (METRO): Performed by: INTERNAL MEDICINE

## 2019-01-01 PROCEDURE — 99291 CRITICAL CARE FIRST HOUR: CPT | Mod: GC | Performed by: INTERNAL MEDICINE

## 2019-01-01 PROCEDURE — 93451 RIGHT HEART CATH: CPT | Performed by: INTERNAL MEDICINE

## 2019-01-01 PROCEDURE — 84443 ASSAY THYROID STIM HORMONE: CPT | Performed by: STUDENT IN AN ORGANIZED HEALTH CARE EDUCATION/TRAINING PROGRAM

## 2019-01-01 PROCEDURE — 93005 ELECTROCARDIOGRAM TRACING: CPT

## 2019-01-01 PROCEDURE — 25000125 ZZHC RX 250: Performed by: STUDENT IN AN ORGANIZED HEALTH CARE EDUCATION/TRAINING PROGRAM

## 2019-01-01 PROCEDURE — 25000125 ZZHC RX 250

## 2019-01-01 PROCEDURE — 80053 COMPREHEN METABOLIC PANEL: CPT | Performed by: STUDENT IN AN ORGANIZED HEALTH CARE EDUCATION/TRAINING PROGRAM

## 2019-01-01 PROCEDURE — 82607 VITAMIN B-12: CPT | Performed by: STUDENT IN AN ORGANIZED HEALTH CARE EDUCATION/TRAINING PROGRAM

## 2019-01-01 PROCEDURE — 40000802 ZZH SITE CHECK

## 2019-01-01 PROCEDURE — 99292 CRITICAL CARE ADDL 30 MIN: CPT | Mod: 25 | Performed by: INTERNAL MEDICINE

## 2019-01-01 PROCEDURE — 84132 ASSAY OF SERUM POTASSIUM: CPT | Performed by: STUDENT IN AN ORGANIZED HEALTH CARE EDUCATION/TRAINING PROGRAM

## 2019-01-01 PROCEDURE — 97750 PHYSICAL PERFORMANCE TEST: CPT | Mod: GP | Performed by: REHABILITATION PRACTITIONER

## 2019-01-01 PROCEDURE — 85520 HEPARIN ASSAY: CPT | Performed by: INTERNAL MEDICINE

## 2019-01-01 PROCEDURE — 86800 THYROGLOBULIN ANTIBODY: CPT | Performed by: INTERNAL MEDICINE

## 2019-01-01 PROCEDURE — 85027 COMPLETE CBC AUTOMATED: CPT | Performed by: INTERNAL MEDICINE

## 2019-01-01 PROCEDURE — 94060 EVALUATION OF WHEEZING: CPT

## 2019-01-01 PROCEDURE — 33967 INSERT I-AORT PERCUT DEVICE: CPT | Performed by: INTERNAL MEDICINE

## 2019-01-01 PROCEDURE — 86901 BLOOD TYPING SEROLOGIC RH(D): CPT | Performed by: INTERNAL MEDICINE

## 2019-01-01 PROCEDURE — 76775 US EXAM ABDO BACK WALL LIM: CPT

## 2019-01-01 PROCEDURE — 86706 HEP B SURFACE ANTIBODY: CPT | Performed by: STUDENT IN AN ORGANIZED HEALTH CARE EDUCATION/TRAINING PROGRAM

## 2019-01-01 PROCEDURE — 40000986 XR CHEST PORT 1 VW

## 2019-01-01 PROCEDURE — 99221 1ST HOSP IP/OBS SF/LOW 40: CPT | Mod: GC | Performed by: INTERNAL MEDICINE

## 2019-01-01 PROCEDURE — 97162 PT EVAL MOD COMPLEX 30 MIN: CPT | Mod: GP

## 2019-01-01 PROCEDURE — 25000125 ZZHC RX 250: Performed by: INTERNAL MEDICINE

## 2019-01-01 PROCEDURE — 86787 VARICELLA-ZOSTER ANTIBODY: CPT | Performed by: STUDENT IN AN ORGANIZED HEALTH CARE EDUCATION/TRAINING PROGRAM

## 2019-01-01 PROCEDURE — 85610 PROTHROMBIN TIME: CPT | Performed by: STUDENT IN AN ORGANIZED HEALTH CARE EDUCATION/TRAINING PROGRAM

## 2019-01-01 PROCEDURE — 80061 LIPID PANEL: CPT | Performed by: STUDENT IN AN ORGANIZED HEALTH CARE EDUCATION/TRAINING PROGRAM

## 2019-01-01 PROCEDURE — 76882 US LMTD JT/FCL EVL NVASC XTR: CPT | Mod: 50

## 2019-01-01 PROCEDURE — 40000081 ZZH STATISTIC INSERT IABP

## 2019-01-01 PROCEDURE — 97110 THERAPEUTIC EXERCISES: CPT | Mod: GO | Performed by: OCCUPATIONAL THERAPIST

## 2019-01-01 PROCEDURE — 85730 THROMBOPLASTIN TIME PARTIAL: CPT | Performed by: STUDENT IN AN ORGANIZED HEALTH CARE EDUCATION/TRAINING PROGRAM

## 2019-01-01 PROCEDURE — 71045 X-RAY EXAM CHEST 1 VIEW: CPT

## 2019-01-01 PROCEDURE — A9270 NON-COVERED ITEM OR SERVICE: HCPCS | Mod: GY

## 2019-01-01 PROCEDURE — 40000133 ZZH STATISTIC OT WARD VISIT: Performed by: OCCUPATIONAL THERAPIST

## 2019-01-01 PROCEDURE — 87088 URINE BACTERIA CULTURE: CPT | Performed by: INTERNAL MEDICINE

## 2019-01-01 PROCEDURE — 36415 COLL VENOUS BLD VENIPUNCTURE: CPT | Performed by: INTERNAL MEDICINE

## 2019-01-01 PROCEDURE — 86147 CARDIOLIPIN ANTIBODY EA IG: CPT | Performed by: STUDENT IN AN ORGANIZED HEALTH CARE EDUCATION/TRAINING PROGRAM

## 2019-01-01 PROCEDURE — 85613 RUSSELL VIPER VENOM DILUTED: CPT | Performed by: STUDENT IN AN ORGANIZED HEALTH CARE EDUCATION/TRAINING PROGRAM

## 2019-01-01 PROCEDURE — 87340 HEPATITIS B SURFACE AG IA: CPT | Performed by: STUDENT IN AN ORGANIZED HEALTH CARE EDUCATION/TRAINING PROGRAM

## 2019-01-01 PROCEDURE — 21400000 ZZH R&B CCU UMMC

## 2019-01-01 PROCEDURE — 82810 BLOOD GASES O2 SAT ONLY: CPT | Performed by: STUDENT IN AN ORGANIZED HEALTH CARE EDUCATION/TRAINING PROGRAM

## 2019-01-01 PROCEDURE — 84550 ASSAY OF BLOOD/URIC ACID: CPT | Performed by: STUDENT IN AN ORGANIZED HEALTH CARE EDUCATION/TRAINING PROGRAM

## 2019-01-01 PROCEDURE — 97535 SELF CARE MNGMENT TRAINING: CPT | Mod: GO | Performed by: OCCUPATIONAL THERAPIST

## 2019-01-01 PROCEDURE — C1769 GUIDE WIRE: HCPCS | Performed by: INTERNAL MEDICINE

## 2019-01-01 PROCEDURE — 27210577 ZZ H INTRODUCER MICRO SET

## 2019-01-01 PROCEDURE — 93010 ELECTROCARDIOGRAM REPORT: CPT | Performed by: INTERNAL MEDICINE

## 2019-01-01 PROCEDURE — 84100 ASSAY OF PHOSPHORUS: CPT | Performed by: INTERNAL MEDICINE

## 2019-01-01 PROCEDURE — 86696 HERPES SIMPLEX TYPE 2 TEST: CPT | Performed by: STUDENT IN AN ORGANIZED HEALTH CARE EDUCATION/TRAINING PROGRAM

## 2019-01-01 PROCEDURE — 86900 BLOOD TYPING SEROLOGIC ABO: CPT | Performed by: INTERNAL MEDICINE

## 2019-01-01 PROCEDURE — 00000146 ZZHCL STATISTIC GLUCOSE BY METER IP

## 2019-01-01 PROCEDURE — 80307 DRUG TEST PRSMV CHEM ANLYZR: CPT | Performed by: INTERNAL MEDICINE

## 2019-01-01 PROCEDURE — 86480 TB TEST CELL IMMUN MEASURE: CPT | Performed by: INTERNAL MEDICINE

## 2019-01-01 PROCEDURE — 40000895 ZZH STATISTIC SLP IP EVAL DEFER

## 2019-01-01 PROCEDURE — 85597 PHOSPHOLIPID PLTLT NEUTRALIZ: CPT | Performed by: STUDENT IN AN ORGANIZED HEALTH CARE EDUCATION/TRAINING PROGRAM

## 2019-01-01 PROCEDURE — 85027 COMPLETE CBC AUTOMATED: CPT | Performed by: STUDENT IN AN ORGANIZED HEALTH CARE EDUCATION/TRAINING PROGRAM

## 2019-01-01 PROCEDURE — 97116 GAIT TRAINING THERAPY: CPT | Mod: GP

## 2019-01-01 PROCEDURE — 85347 COAGULATION TIME ACTIVATED: CPT

## 2019-01-01 PROCEDURE — 84466 ASSAY OF TRANSFERRIN: CPT | Performed by: STUDENT IN AN ORGANIZED HEALTH CARE EDUCATION/TRAINING PROGRAM

## 2019-01-01 PROCEDURE — 93922 UPR/L XTREMITY ART 2 LEVELS: CPT

## 2019-01-01 PROCEDURE — 00000401 ZZHCL STATISTIC THROMBIN TIME NC: Performed by: STUDENT IN AN ORGANIZED HEALTH CARE EDUCATION/TRAINING PROGRAM

## 2019-01-01 PROCEDURE — 84134 ASSAY OF PREALBUMIN: CPT | Performed by: STUDENT IN AN ORGANIZED HEALTH CARE EDUCATION/TRAINING PROGRAM

## 2019-01-01 PROCEDURE — 93451 RIGHT HEART CATH: CPT | Mod: 26 | Performed by: INTERNAL MEDICINE

## 2019-01-01 PROCEDURE — 85732 THROMBOPLASTIN TIME PARTIAL: CPT | Performed by: STUDENT IN AN ORGANIZED HEALTH CARE EDUCATION/TRAINING PROGRAM

## 2019-01-01 PROCEDURE — 83615 LACTATE (LD) (LDH) ENZYME: CPT | Performed by: STUDENT IN AN ORGANIZED HEALTH CARE EDUCATION/TRAINING PROGRAM

## 2019-01-01 PROCEDURE — 82805 BLOOD GASES W/O2 SATURATION: CPT | Performed by: STUDENT IN AN ORGANIZED HEALTH CARE EDUCATION/TRAINING PROGRAM

## 2019-01-01 PROCEDURE — 70450 CT HEAD/BRAIN W/O DYE: CPT

## 2019-01-01 PROCEDURE — 81001 URINALYSIS AUTO W/SCOPE: CPT | Performed by: INTERNAL MEDICINE

## 2019-01-01 PROCEDURE — 97165 OT EVAL LOW COMPLEX 30 MIN: CPT | Mod: GO | Performed by: OCCUPATIONAL THERAPIST

## 2019-01-01 PROCEDURE — 86376 MICROSOMAL ANTIBODY EACH: CPT | Performed by: INTERNAL MEDICINE

## 2019-01-01 PROCEDURE — 86704 HEP B CORE ANTIBODY TOTAL: CPT | Performed by: STUDENT IN AN ORGANIZED HEALTH CARE EDUCATION/TRAINING PROGRAM

## 2019-01-01 PROCEDURE — 27210305 ZZH CATH BALLOON IABP

## 2019-01-01 PROCEDURE — 36592 COLLECT BLOOD FROM PICC: CPT

## 2019-01-01 PROCEDURE — 86803 HEPATITIS C AB TEST: CPT | Performed by: STUDENT IN AN ORGANIZED HEALTH CARE EDUCATION/TRAINING PROGRAM

## 2019-01-01 PROCEDURE — 36569 INSJ PICC 5 YR+ W/O IMAGING: CPT

## 2019-01-01 PROCEDURE — 80076 HEPATIC FUNCTION PANEL: CPT | Performed by: STUDENT IN AN ORGANIZED HEALTH CARE EDUCATION/TRAINING PROGRAM

## 2019-01-01 PROCEDURE — 81240 F2 GENE: CPT | Performed by: INTERNAL MEDICINE

## 2019-01-01 PROCEDURE — 84436 ASSAY OF TOTAL THYROXINE: CPT | Performed by: INTERNAL MEDICINE

## 2019-01-01 PROCEDURE — 5A02210 ASSISTANCE WITH CARDIAC OUTPUT USING BALLOON PUMP, CONTINUOUS: ICD-10-PCS | Performed by: INTERNAL MEDICINE

## 2019-01-01 PROCEDURE — 99152 MOD SED SAME PHYS/QHP 5/>YRS: CPT | Performed by: INTERNAL MEDICINE

## 2019-01-01 PROCEDURE — 99291 CRITICAL CARE FIRST HOUR: CPT | Mod: 25 | Performed by: INTERNAL MEDICINE

## 2019-01-01 PROCEDURE — 99232 SBSQ HOSP IP/OBS MODERATE 35: CPT | Mod: GC | Performed by: INTERNAL MEDICINE

## 2019-01-01 PROCEDURE — 82803 BLOOD GASES ANY COMBINATION: CPT | Performed by: STUDENT IN AN ORGANIZED HEALTH CARE EDUCATION/TRAINING PROGRAM

## 2019-01-01 PROCEDURE — 84480 ASSAY TRIIODOTHYRONINE (T3): CPT | Performed by: INTERNAL MEDICINE

## 2019-01-01 PROCEDURE — 86780 TREPONEMA PALLIDUM: CPT | Performed by: STUDENT IN AN ORGANIZED HEALTH CARE EDUCATION/TRAINING PROGRAM

## 2019-01-01 PROCEDURE — 4A023N6 MEASUREMENT OF CARDIAC SAMPLING AND PRESSURE, RIGHT HEART, PERCUTANEOUS APPROACH: ICD-10-PCS | Performed by: INTERNAL MEDICINE

## 2019-01-01 PROCEDURE — 27210195 ZZH KIT POWER PICC DOUBLE LUMEN

## 2019-01-01 PROCEDURE — 40000193 ZZH STATISTIC PT WARD VISIT

## 2019-01-01 PROCEDURE — 84439 ASSAY OF FREE THYROXINE: CPT | Performed by: INTERNAL MEDICINE

## 2019-01-01 PROCEDURE — 87186 SC STD MICRODIL/AGAR DIL: CPT | Performed by: INTERNAL MEDICINE

## 2019-01-01 PROCEDURE — 82803 BLOOD GASES ANY COMBINATION: CPT

## 2019-01-01 PROCEDURE — 33968 REMOVE AORTIC ASSIST DEVICE: CPT

## 2019-01-01 PROCEDURE — 83605 ASSAY OF LACTIC ACID: CPT | Performed by: STUDENT IN AN ORGANIZED HEALTH CARE EDUCATION/TRAINING PROGRAM

## 2019-01-01 PROCEDURE — C1725 CATH, TRANSLUMIN NON-LASER: HCPCS | Performed by: INTERNAL MEDICINE

## 2019-01-01 PROCEDURE — 71046 X-RAY EXAM CHEST 2 VIEWS: CPT

## 2019-01-01 PROCEDURE — 83550 IRON BINDING TEST: CPT | Performed by: STUDENT IN AN ORGANIZED HEALTH CARE EDUCATION/TRAINING PROGRAM

## 2019-01-01 PROCEDURE — 76700 US EXAM ABDOM COMPLETE: CPT

## 2019-01-01 PROCEDURE — 82140 ASSAY OF AMMONIA: CPT | Performed by: STUDENT IN AN ORGANIZED HEALTH CARE EDUCATION/TRAINING PROGRAM

## 2019-01-01 PROCEDURE — 40000281 ZZH STATISTIC TRANSPORT TIME EA 15 MIN

## 2019-01-01 PROCEDURE — 82805 BLOOD GASES W/O2 SATURATION: CPT

## 2019-01-01 PROCEDURE — 84443 ASSAY THYROID STIM HORMONE: CPT | Performed by: INTERNAL MEDICINE

## 2019-01-01 PROCEDURE — 86777 TOXOPLASMA ANTIBODY: CPT | Performed by: STUDENT IN AN ORGANIZED HEALTH CARE EDUCATION/TRAINING PROGRAM

## 2019-01-01 PROCEDURE — 85025 COMPLETE CBC W/AUTO DIFF WBC: CPT | Performed by: INTERNAL MEDICINE

## 2019-01-01 PROCEDURE — 40000193 ZZH STATISTIC PT WARD VISIT: Performed by: REHABILITATION PRACTITIONER

## 2019-01-01 PROCEDURE — 83540 ASSAY OF IRON: CPT | Performed by: STUDENT IN AN ORGANIZED HEALTH CARE EDUCATION/TRAINING PROGRAM

## 2019-01-01 PROCEDURE — 85525 HEPARIN NEUTRALIZATION: CPT | Performed by: STUDENT IN AN ORGANIZED HEALTH CARE EDUCATION/TRAINING PROGRAM

## 2019-01-01 PROCEDURE — 27210794 ZZH OR GENERAL SUPPLY STERILE: Performed by: INTERNAL MEDICINE

## 2019-01-01 PROCEDURE — 94618 PULMONARY STRESS TESTING: CPT | Performed by: OCCUPATIONAL THERAPIST

## 2019-01-01 PROCEDURE — 25800025 ZZH RX 258: Performed by: STUDENT IN AN ORGANIZED HEALTH CARE EDUCATION/TRAINING PROGRAM

## 2019-01-01 PROCEDURE — 84439 ASSAY OF FREE THYROXINE: CPT | Performed by: STUDENT IN AN ORGANIZED HEALTH CARE EDUCATION/TRAINING PROGRAM

## 2019-01-01 PROCEDURE — 84481 FREE ASSAY (FT-3): CPT | Performed by: INTERNAL MEDICINE

## 2019-01-01 PROCEDURE — 97530 THERAPEUTIC ACTIVITIES: CPT | Mod: GP

## 2019-01-01 PROCEDURE — 86695 HERPES SIMPLEX TYPE 1 TEST: CPT | Performed by: STUDENT IN AN ORGANIZED HEALTH CARE EDUCATION/TRAINING PROGRAM

## 2019-01-01 PROCEDURE — 33967 INSERT I-AORT PERCUT DEVICE: CPT | Mod: GC | Performed by: INTERNAL MEDICINE

## 2019-01-01 PROCEDURE — 81241 F5 GENE: CPT | Performed by: INTERNAL MEDICINE

## 2019-01-01 PROCEDURE — 12000012 ZZH R&B MS OVERFLOW UMMC

## 2019-01-01 PROCEDURE — 86905 BLOOD TYPING RBC ANTIGENS: CPT | Performed by: INTERNAL MEDICINE

## 2019-01-01 PROCEDURE — 86140 C-REACTIVE PROTEIN: CPT | Performed by: STUDENT IN AN ORGANIZED HEALTH CARE EDUCATION/TRAINING PROGRAM

## 2019-01-01 PROCEDURE — 93925 LOWER EXTREMITY STUDY: CPT

## 2019-01-01 PROCEDURE — 40000141 ZZH STATISTIC PERIPHERAL IV START W/O US GUIDANCE

## 2019-01-01 PROCEDURE — 93880 EXTRACRANIAL BILAT STUDY: CPT

## 2019-01-01 PROCEDURE — 87086 URINE CULTURE/COLONY COUNT: CPT | Performed by: INTERNAL MEDICINE

## 2019-01-01 PROCEDURE — 99153 MOD SED SAME PHYS/QHP EA: CPT

## 2019-01-01 RX ORDER — FUROSEMIDE 10 MG/ML
20 INJECTION INTRAMUSCULAR; INTRAVENOUS ONCE
Status: COMPLETED | OUTPATIENT
Start: 2019-01-01 | End: 2019-01-01

## 2019-01-01 RX ORDER — FLUTICASONE PROPIONATE 50 MCG
1 SPRAY, SUSPENSION (ML) NASAL DAILY
Status: DISCONTINUED | OUTPATIENT
Start: 2019-01-01 | End: 2019-01-01 | Stop reason: HOSPADM

## 2019-01-01 RX ORDER — MAGNESIUM SULFATE 1 G/100ML
1 INJECTION INTRAVENOUS ONCE
Status: COMPLETED | OUTPATIENT
Start: 2019-01-01 | End: 2019-01-01

## 2019-01-01 RX ORDER — WARFARIN SODIUM 3 MG/1
3 TABLET ORAL
Status: COMPLETED | OUTPATIENT
Start: 2019-01-01 | End: 2019-01-01

## 2019-01-01 RX ORDER — POTASSIUM CHLORIDE 29.8 MG/ML
20 INJECTION INTRAVENOUS ONCE
Status: DISCONTINUED | OUTPATIENT
Start: 2019-01-01 | End: 2019-01-01

## 2019-01-01 RX ORDER — FUROSEMIDE 20 MG
20 TABLET ORAL ONCE
Status: COMPLETED | OUTPATIENT
Start: 2019-01-01 | End: 2019-01-01

## 2019-01-01 RX ORDER — GUAIFENESIN 600 MG/1
600 TABLET, EXTENDED RELEASE ORAL 2 TIMES DAILY
Status: DISCONTINUED | OUTPATIENT
Start: 2019-01-01 | End: 2019-01-01 | Stop reason: CLARIF

## 2019-01-01 RX ORDER — POTASSIUM CHLORIDE 750 MG/1
40 TABLET, EXTENDED RELEASE ORAL ONCE
Status: COMPLETED | OUTPATIENT
Start: 2019-01-01 | End: 2019-01-01

## 2019-01-01 RX ORDER — NICOTINE POLACRILEX 4 MG
15-30 LOZENGE BUCCAL
Status: DISCONTINUED | OUTPATIENT
Start: 2019-01-01 | End: 2019-01-01 | Stop reason: HOSPADM

## 2019-01-01 RX ORDER — POTASSIUM CHLORIDE 1.5 G/1.58G
20-40 POWDER, FOR SOLUTION ORAL
Status: DISCONTINUED | OUTPATIENT
Start: 2019-01-01 | End: 2019-01-01

## 2019-01-01 RX ORDER — LORAZEPAM 2 MG/ML
1 INJECTION INTRAMUSCULAR EVERY 4 HOURS PRN
Status: DISCONTINUED | OUTPATIENT
Start: 2019-01-01 | End: 2019-01-01 | Stop reason: HOSPADM

## 2019-01-01 RX ORDER — FENTANYL CITRATE 50 UG/ML
50 INJECTION, SOLUTION INTRAMUSCULAR; INTRAVENOUS EVERY 5 MIN PRN
Status: DISPENSED | OUTPATIENT
Start: 2019-01-01 | End: 2019-01-01

## 2019-01-01 RX ORDER — FUROSEMIDE 10 MG/ML
40 INJECTION INTRAMUSCULAR; INTRAVENOUS ONCE
Status: COMPLETED | OUTPATIENT
Start: 2019-01-01 | End: 2019-01-01

## 2019-01-01 RX ORDER — HYDRALAZINE HYDROCHLORIDE 10 MG/1
10 TABLET, FILM COATED ORAL EVERY 8 HOURS SCHEDULED
Status: DISCONTINUED | OUTPATIENT
Start: 2019-01-01 | End: 2019-01-01 | Stop reason: HOSPADM

## 2019-01-01 RX ORDER — RANOLAZINE 500 MG/1
500 TABLET, EXTENDED RELEASE ORAL ONCE
Status: COMPLETED | OUTPATIENT
Start: 2019-01-01 | End: 2019-01-01

## 2019-01-01 RX ORDER — FENTANYL CITRATE 50 UG/ML
INJECTION, SOLUTION INTRAMUSCULAR; INTRAVENOUS
Status: DISCONTINUED | OUTPATIENT
Start: 2019-01-01 | End: 2019-01-01 | Stop reason: HOSPADM

## 2019-01-01 RX ORDER — CIPROFLOXACIN 250 MG/1
250 TABLET, FILM COATED ORAL EVERY 12 HOURS SCHEDULED
Status: DISCONTINUED | OUTPATIENT
Start: 2019-01-01 | End: 2019-01-01

## 2019-01-01 RX ORDER — POTASSIUM CHLORIDE 7.45 MG/ML
10 INJECTION INTRAVENOUS
Status: DISCONTINUED | OUTPATIENT
Start: 2019-01-01 | End: 2019-01-01

## 2019-01-01 RX ORDER — RANOLAZINE 500 MG/1
500 TABLET, EXTENDED RELEASE ORAL 2 TIMES DAILY
Status: DISCONTINUED | OUTPATIENT
Start: 2019-01-01 | End: 2019-01-01

## 2019-01-01 RX ORDER — AMIODARONE HYDROCHLORIDE 200 MG/1
400 TABLET ORAL DAILY
Status: DISCONTINUED | OUTPATIENT
Start: 2019-01-01 | End: 2019-01-01 | Stop reason: HOSPADM

## 2019-01-01 RX ORDER — CEFDINIR 300 MG/1
300 CAPSULE ORAL EVERY 24 HOURS
Status: COMPLETED | OUTPATIENT
Start: 2019-01-01 | End: 2019-01-01

## 2019-01-01 RX ORDER — MAGNESIUM SULFATE HEPTAHYDRATE 40 MG/ML
2 INJECTION, SOLUTION INTRAVENOUS DAILY PRN
Status: DISCONTINUED | OUTPATIENT
Start: 2019-01-01 | End: 2019-01-01 | Stop reason: HOSPADM

## 2019-01-01 RX ORDER — AMIODARONE HYDROCHLORIDE 200 MG/1
400 TABLET ORAL 2 TIMES DAILY
Status: DISCONTINUED | OUTPATIENT
Start: 2019-01-01 | End: 2019-01-01

## 2019-01-01 RX ORDER — DOCUSATE SODIUM 100 MG/1
100 CAPSULE, LIQUID FILLED ORAL 2 TIMES DAILY PRN
Status: DISCONTINUED | OUTPATIENT
Start: 2019-01-01 | End: 2019-01-01 | Stop reason: HOSPADM

## 2019-01-01 RX ORDER — CARVEDILOL 3.12 MG/1
3.12 TABLET ORAL 2 TIMES DAILY WITH MEALS
Status: DISCONTINUED | OUTPATIENT
Start: 2019-01-01 | End: 2019-01-01

## 2019-01-01 RX ORDER — AMIODARONE HYDROCHLORIDE 200 MG/1
200 TABLET ORAL DAILY
COMMUNITY

## 2019-01-01 RX ORDER — DEXTROSE MONOHYDRATE 25 G/50ML
25-50 INJECTION, SOLUTION INTRAVENOUS
Status: DISCONTINUED | OUTPATIENT
Start: 2019-01-01 | End: 2019-01-01 | Stop reason: HOSPADM

## 2019-01-01 RX ORDER — POTASSIUM CHLORIDE 750 MG/1
20-40 TABLET, EXTENDED RELEASE ORAL
Status: DISCONTINUED | OUTPATIENT
Start: 2019-01-01 | End: 2019-01-01

## 2019-01-01 RX ORDER — POTASSIUM CL/LIDO/0.9 % NACL 10MEQ/0.1L
10 INTRAVENOUS SOLUTION, PIGGYBACK (ML) INTRAVENOUS
Status: DISCONTINUED | OUTPATIENT
Start: 2019-01-01 | End: 2019-01-01

## 2019-01-01 RX ORDER — WARFARIN SODIUM 4 MG/1
4 TABLET ORAL
Status: COMPLETED | OUTPATIENT
Start: 2019-01-01 | End: 2019-01-01

## 2019-01-01 RX ORDER — PANTOPRAZOLE SODIUM 40 MG/1
40 TABLET, DELAYED RELEASE ORAL
Status: DISCONTINUED | OUTPATIENT
Start: 2019-01-01 | End: 2019-01-01

## 2019-01-01 RX ORDER — BACLOFEN 10 MG/1
2.5 TABLET ORAL 2 TIMES DAILY PRN
Status: DISCONTINUED | OUTPATIENT
Start: 2019-01-01 | End: 2019-01-01 | Stop reason: HOSPADM

## 2019-01-01 RX ORDER — WARFARIN SODIUM 1 MG/1
1 TABLET ORAL
Status: COMPLETED | OUTPATIENT
Start: 2019-01-01 | End: 2019-01-01

## 2019-01-01 RX ORDER — ESMOLOL HYDROCHLORIDE 20 MG/ML
50-300 INJECTION, SOLUTION INTRAVENOUS CONTINUOUS
Status: DISCONTINUED | OUTPATIENT
Start: 2019-01-01 | End: 2019-01-01

## 2019-01-01 RX ORDER — RANOLAZINE 500 MG/1
1000 TABLET, EXTENDED RELEASE ORAL 2 TIMES DAILY
Status: DISCONTINUED | OUTPATIENT
Start: 2019-01-01 | End: 2019-01-01 | Stop reason: HOSPADM

## 2019-01-01 RX ORDER — BUMETANIDE 0.25 MG/ML
1 INJECTION INTRAMUSCULAR; INTRAVENOUS ONCE
Status: COMPLETED | OUTPATIENT
Start: 2019-01-01 | End: 2019-01-01

## 2019-01-01 RX ORDER — FENTANYL CITRATE 50 UG/ML
INJECTION, SOLUTION INTRAMUSCULAR; INTRAVENOUS
Status: COMPLETED
Start: 2019-01-01 | End: 2019-01-01

## 2019-01-01 RX ORDER — NALOXONE HYDROCHLORIDE 0.4 MG/ML
.1-.4 INJECTION, SOLUTION INTRAMUSCULAR; INTRAVENOUS; SUBCUTANEOUS
Status: DISCONTINUED | OUTPATIENT
Start: 2019-01-01 | End: 2019-01-01

## 2019-01-01 RX ORDER — FENTANYL CITRATE 50 UG/ML
25 INJECTION, SOLUTION INTRAMUSCULAR; INTRAVENOUS
Status: DISCONTINUED | OUTPATIENT
Start: 2019-01-01 | End: 2019-01-01

## 2019-01-01 RX ORDER — TORSEMIDE 20 MG/1
40 TABLET ORAL ONCE
Status: COMPLETED | OUTPATIENT
Start: 2019-01-01 | End: 2019-01-01

## 2019-01-01 RX ORDER — MAGNESIUM SULFATE HEPTAHYDRATE 40 MG/ML
4 INJECTION, SOLUTION INTRAVENOUS EVERY 4 HOURS PRN
Status: DISCONTINUED | OUTPATIENT
Start: 2019-01-01 | End: 2019-01-01 | Stop reason: HOSPADM

## 2019-01-01 RX ORDER — HYDROMORPHONE HYDROCHLORIDE 1 MG/ML
0.5 INJECTION, SOLUTION INTRAMUSCULAR; INTRAVENOUS; SUBCUTANEOUS ONCE
Status: COMPLETED | OUTPATIENT
Start: 2019-01-01 | End: 2019-01-01

## 2019-01-01 RX ORDER — WARFARIN SODIUM 2.5 MG/1
2.5 TABLET ORAL
Status: DISCONTINUED | OUTPATIENT
Start: 2019-01-01 | End: 2019-01-01

## 2019-01-01 RX ORDER — TORSEMIDE 20 MG/1
20 TABLET ORAL DAILY
Status: DISCONTINUED | OUTPATIENT
Start: 2019-01-01 | End: 2019-01-01

## 2019-01-01 RX ORDER — POTASSIUM CHLORIDE 29.8 MG/ML
20 INJECTION INTRAVENOUS
Status: DISCONTINUED | OUTPATIENT
Start: 2019-01-01 | End: 2019-01-01

## 2019-01-01 RX ORDER — CEFAZOLIN SODIUM 1 G/3ML
1 INJECTION, POWDER, FOR SOLUTION INTRAMUSCULAR; INTRAVENOUS EVERY 12 HOURS
Status: DISCONTINUED | OUTPATIENT
Start: 2019-01-01 | End: 2019-01-01

## 2019-01-01 RX ORDER — LIDOCAINE 40 MG/G
CREAM TOPICAL
Status: DISCONTINUED | OUTPATIENT
Start: 2019-01-01 | End: 2019-01-01

## 2019-01-01 RX ORDER — FUROSEMIDE 20 MG
20 TABLET ORAL DAILY
Status: DISCONTINUED | OUTPATIENT
Start: 2019-01-01 | End: 2019-01-01

## 2019-01-01 RX ORDER — SODIUM CHLORIDE 9 MG/ML
INJECTION, SOLUTION INTRAVENOUS CONTINUOUS
Status: DISCONTINUED | OUTPATIENT
Start: 2019-01-01 | End: 2019-01-01

## 2019-01-01 RX ORDER — SODIUM POLYSTYRENE SULFONATE 15 G/60ML
30 SUSPENSION ORAL; RECTAL ONCE
Status: DISCONTINUED | OUTPATIENT
Start: 2019-01-01 | End: 2019-01-01

## 2019-01-01 RX ORDER — TORSEMIDE 20 MG/1
40 TABLET ORAL
Status: DISCONTINUED | OUTPATIENT
Start: 2019-01-01 | End: 2019-01-01 | Stop reason: HOSPADM

## 2019-01-01 RX ORDER — BUPIVACAINE HYDROCHLORIDE 2.5 MG/ML
INJECTION, SOLUTION EPIDURAL; INFILTRATION; INTRACAUDAL PRN
Status: DISCONTINUED | OUTPATIENT
Start: 2019-01-01 | End: 2019-01-01

## 2019-01-01 RX ORDER — HYDROMORPHONE HYDROCHLORIDE 1 MG/ML
INJECTION, SOLUTION INTRAMUSCULAR; INTRAVENOUS; SUBCUTANEOUS
Status: COMPLETED
Start: 2019-01-01 | End: 2019-01-01

## 2019-01-01 RX ORDER — NALOXONE HYDROCHLORIDE 0.4 MG/ML
.1-.4 INJECTION, SOLUTION INTRAMUSCULAR; INTRAVENOUS; SUBCUTANEOUS
Status: DISCONTINUED | OUTPATIENT
Start: 2019-01-01 | End: 2019-01-01 | Stop reason: HOSPADM

## 2019-01-01 RX ORDER — ALBUMIN, HUMAN INJ 5% 5 %
SOLUTION INTRAVENOUS
Status: DISCONTINUED
Start: 2019-01-01 | End: 2019-01-01 | Stop reason: HOSPADM

## 2019-01-01 RX ORDER — LIDOCAINE 4 G/G
1 PATCH TOPICAL
Status: DISCONTINUED | OUTPATIENT
Start: 2019-01-01 | End: 2019-01-01 | Stop reason: HOSPADM

## 2019-01-01 RX ORDER — HEPARIN SODIUM,PORCINE 10 UNIT/ML
2-5 VIAL (ML) INTRAVENOUS
Status: COMPLETED | OUTPATIENT
Start: 2019-01-01 | End: 2019-01-01

## 2019-01-01 RX ORDER — DEXTROSE MONOHYDRATE 25 G/50ML
25 INJECTION, SOLUTION INTRAVENOUS ONCE
Status: COMPLETED | OUTPATIENT
Start: 2019-01-01 | End: 2019-01-01

## 2019-01-01 RX ORDER — SODIUM POLYSTYRENE SULFONATE 4.1 MEQ/G
30 POWDER, FOR SUSPENSION ORAL; RECTAL ONCE
Status: DISCONTINUED | OUTPATIENT
Start: 2019-01-01 | End: 2019-01-01 | Stop reason: HOSPADM

## 2019-01-01 RX ORDER — ATROPINE SULFATE 0.1 MG/ML
0.4 INJECTION INTRAVENOUS ONCE
Status: DISCONTINUED | OUTPATIENT
Start: 2019-01-01 | End: 2019-01-01

## 2019-01-01 RX ORDER — CYCLOBENZAPRINE HCL 5 MG
10 TABLET ORAL 3 TIMES DAILY PRN
Status: DISCONTINUED | OUTPATIENT
Start: 2019-01-01 | End: 2019-01-01 | Stop reason: HOSPADM

## 2019-01-01 RX ORDER — DOCUSATE SODIUM 100 MG/1
100 CAPSULE, LIQUID FILLED ORAL 2 TIMES DAILY
Status: DISCONTINUED | OUTPATIENT
Start: 2019-01-01 | End: 2019-01-01

## 2019-01-01 RX ORDER — WARFARIN SODIUM 2 MG/1
2 TABLET ORAL
Status: DISCONTINUED | OUTPATIENT
Start: 2019-01-01 | End: 2019-01-01

## 2019-01-01 RX ORDER — FUROSEMIDE 40 MG
40 TABLET ORAL
Status: DISCONTINUED | OUTPATIENT
Start: 2019-01-01 | End: 2019-01-01

## 2019-01-01 RX ORDER — HEPARIN SODIUM 10000 [USP'U]/100ML
0-3500 INJECTION, SOLUTION INTRAVENOUS CONTINUOUS
Status: DISCONTINUED | OUTPATIENT
Start: 2019-01-01 | End: 2019-01-01

## 2019-01-01 RX ORDER — LIDOCAINE HYDROCHLORIDE 10 MG/ML
INJECTION, SOLUTION EPIDURAL; INFILTRATION; INTRACAUDAL; PERINEURAL
Status: DISCONTINUED | OUTPATIENT
Start: 2019-01-01 | End: 2019-01-01 | Stop reason: HOSPADM

## 2019-01-01 RX ORDER — FUROSEMIDE 40 MG
40 TABLET ORAL DAILY
Status: DISCONTINUED | OUTPATIENT
Start: 2019-01-01 | End: 2019-01-01

## 2019-01-01 RX ADMIN — RANOLAZINE 500 MG: 500 TABLET, FILM COATED, EXTENDED RELEASE ORAL at 23:03

## 2019-01-01 RX ADMIN — HYDRALAZINE HYDROCHLORIDE 10 MG: 10 TABLET, FILM COATED ORAL at 06:50

## 2019-01-01 RX ADMIN — RANOLAZINE 1000 MG: 500 TABLET, FILM COATED, EXTENDED RELEASE ORAL at 20:57

## 2019-01-01 RX ADMIN — PANTOPRAZOLE SODIUM 40 MG: 40 TABLET, DELAYED RELEASE ORAL at 07:49

## 2019-01-01 RX ADMIN — RANOLAZINE 1000 MG: 500 TABLET, FILM COATED, EXTENDED RELEASE ORAL at 22:41

## 2019-01-01 RX ADMIN — WARFARIN SODIUM 4 MG: 4 TABLET ORAL at 18:20

## 2019-01-01 RX ADMIN — AMIODARONE HYDROCHLORIDE 400 MG: 200 TABLET ORAL at 07:56

## 2019-01-01 RX ADMIN — AMIODARONE HYDROCHLORIDE 400 MG: 200 TABLET ORAL at 07:49

## 2019-01-01 RX ADMIN — ASPIRIN 81 MG CHEWABLE TABLET 81 MG: 81 TABLET CHEWABLE at 09:15

## 2019-01-01 RX ADMIN — CEFDINIR 300 MG: 300 CAPSULE ORAL at 20:45

## 2019-01-01 RX ADMIN — RANOLAZINE 500 MG: 500 TABLET, FILM COATED, EXTENDED RELEASE ORAL at 19:27

## 2019-01-01 RX ADMIN — POTASSIUM CHLORIDE 20 MEQ: 29.8 INJECTION, SOLUTION INTRAVENOUS at 04:28

## 2019-01-01 RX ADMIN — FUROSEMIDE 20 MG: 10 INJECTION, SOLUTION INTRAVENOUS at 13:59

## 2019-01-01 RX ADMIN — ALTEPLASE 2 MG: 2.2 INJECTION, POWDER, LYOPHILIZED, FOR SOLUTION INTRAVENOUS at 17:35

## 2019-01-01 RX ADMIN — AMIODARONE HYDROCHLORIDE 400 MG: 200 TABLET ORAL at 19:57

## 2019-01-01 RX ADMIN — SERTRALINE HYDROCHLORIDE 50 MG: 50 TABLET ORAL at 10:23

## 2019-01-01 RX ADMIN — RANOLAZINE 1000 MG: 500 TABLET, FILM COATED, EXTENDED RELEASE ORAL at 08:19

## 2019-01-01 RX ADMIN — WARFARIN SODIUM 1 MG: 1 TABLET ORAL at 17:18

## 2019-01-01 RX ADMIN — CEFAZOLIN SODIUM 1 G: 1 INJECTION, POWDER, FOR SOLUTION INTRAMUSCULAR; INTRAVENOUS at 20:27

## 2019-01-01 RX ADMIN — CEFAZOLIN SODIUM 1 G: 1 INJECTION, POWDER, FOR SOLUTION INTRAMUSCULAR; INTRAVENOUS at 08:31

## 2019-01-01 RX ADMIN — HEPARIN SODIUM 700 UNITS/HR: 10000 INJECTION, SOLUTION INTRAVENOUS at 03:11

## 2019-01-01 RX ADMIN — CEFAZOLIN SODIUM 1 G: 1 INJECTION, POWDER, FOR SOLUTION INTRAMUSCULAR; INTRAVENOUS at 19:48

## 2019-01-01 RX ADMIN — AMIODARONE HYDROCHLORIDE 400 MG: 200 TABLET ORAL at 08:47

## 2019-01-01 RX ADMIN — BUMETANIDE 1 MG/HR: 0.25 INJECTION INTRAMUSCULAR; INTRAVENOUS at 00:00

## 2019-01-01 RX ADMIN — FENTANYL CITRATE 50 MCG: 50 INJECTION, SOLUTION INTRAMUSCULAR; INTRAVENOUS at 12:13

## 2019-01-01 RX ADMIN — TORSEMIDE 40 MG: 20 TABLET ORAL at 08:27

## 2019-01-01 RX ADMIN — POTASSIUM CHLORIDE 20 MEQ: 29.8 INJECTION, SOLUTION INTRAVENOUS at 19:02

## 2019-01-01 RX ADMIN — POTASSIUM CHLORIDE 20 MEQ: 750 TABLET, EXTENDED RELEASE ORAL at 13:13

## 2019-01-01 RX ADMIN — Medication 5 MG: at 21:20

## 2019-01-01 RX ADMIN — DOCUSATE SODIUM 100 MG: 100 CAPSULE, LIQUID FILLED ORAL at 07:56

## 2019-01-01 RX ADMIN — FLUTICASONE PROPIONATE 1 SPRAY: 50 SPRAY, METERED NASAL at 02:15

## 2019-01-01 RX ADMIN — CARVEDILOL 3.12 MG: 3.12 TABLET, FILM COATED ORAL at 08:18

## 2019-01-01 RX ADMIN — CARVEDILOL 3.12 MG: 3.12 TABLET, FILM COATED ORAL at 14:15

## 2019-01-01 RX ADMIN — RANOLAZINE 500 MG: 500 TABLET, FILM COATED, EXTENDED RELEASE ORAL at 08:31

## 2019-01-01 RX ADMIN — ASPIRIN 81 MG CHEWABLE TABLET 81 MG: 81 TABLET CHEWABLE at 07:55

## 2019-01-01 RX ADMIN — RANOLAZINE 1000 MG: 500 TABLET, FILM COATED, EXTENDED RELEASE ORAL at 20:43

## 2019-01-01 RX ADMIN — ASPIRIN 81 MG CHEWABLE TABLET 81 MG: 81 TABLET CHEWABLE at 07:29

## 2019-01-01 RX ADMIN — RANOLAZINE 500 MG: 500 TABLET, FILM COATED, EXTENDED RELEASE ORAL at 20:43

## 2019-01-01 RX ADMIN — BUMETANIDE 1 MG: 0.25 INJECTION INTRAMUSCULAR; INTRAVENOUS at 19:58

## 2019-01-01 RX ADMIN — LORAZEPAM 1 MG: 2 INJECTION, SOLUTION INTRAMUSCULAR; INTRAVENOUS at 03:36

## 2019-01-01 RX ADMIN — SERTRALINE HYDROCHLORIDE 50 MG: 50 TABLET ORAL at 07:29

## 2019-01-01 RX ADMIN — RANOLAZINE 1000 MG: 500 TABLET, FILM COATED, EXTENDED RELEASE ORAL at 07:50

## 2019-01-01 RX ADMIN — RANOLAZINE 500 MG: 500 TABLET, FILM COATED, EXTENDED RELEASE ORAL at 07:56

## 2019-01-01 RX ADMIN — ASPIRIN 81 MG CHEWABLE TABLET 81 MG: 81 TABLET CHEWABLE at 08:00

## 2019-01-01 RX ADMIN — WARFARIN SODIUM 4 MG: 4 TABLET ORAL at 18:32

## 2019-01-01 RX ADMIN — RANOLAZINE 500 MG: 500 TABLET, FILM COATED, EXTENDED RELEASE ORAL at 07:32

## 2019-01-01 RX ADMIN — AMIODARONE HYDROCHLORIDE 150 MG: 1.5 INJECTION, SOLUTION INTRAVENOUS at 02:43

## 2019-01-01 RX ADMIN — FUROSEMIDE 20 MG: 20 TABLET ORAL at 07:56

## 2019-01-01 RX ADMIN — FENTANYL CITRATE 25 MCG: 50 INJECTION INTRAMUSCULAR; INTRAVENOUS at 16:39

## 2019-01-01 RX ADMIN — POTASSIUM CHLORIDE 20 MEQ: 29.8 INJECTION, SOLUTION INTRAVENOUS at 04:25

## 2019-01-01 RX ADMIN — RANOLAZINE 500 MG: 500 TABLET, FILM COATED, EXTENDED RELEASE ORAL at 20:39

## 2019-01-01 RX ADMIN — FUROSEMIDE 20 MG: 20 TABLET ORAL at 15:52

## 2019-01-01 RX ADMIN — RANOLAZINE 500 MG: 500 TABLET, FILM COATED, EXTENDED RELEASE ORAL at 09:15

## 2019-01-01 RX ADMIN — DEXTROSE MONOHYDRATE 25 G: 500 INJECTION PARENTERAL at 01:01

## 2019-01-01 RX ADMIN — SERTRALINE HYDROCHLORIDE 50 MG: 50 TABLET ORAL at 08:56

## 2019-01-01 RX ADMIN — FENTANYL CITRATE 25 MCG: 50 INJECTION INTRAMUSCULAR; INTRAVENOUS at 16:42

## 2019-01-01 RX ADMIN — RANOLAZINE 1000 MG: 500 TABLET, FILM COATED, EXTENDED RELEASE ORAL at 19:57

## 2019-01-01 RX ADMIN — ASPIRIN 81 MG CHEWABLE TABLET 81 MG: 81 TABLET CHEWABLE at 08:31

## 2019-01-01 RX ADMIN — POTASSIUM CHLORIDE 20 MEQ: 29.8 INJECTION, SOLUTION INTRAVENOUS at 15:12

## 2019-01-01 RX ADMIN — POTASSIUM CHLORIDE 20 MEQ: 29.8 INJECTION, SOLUTION INTRAVENOUS at 17:41

## 2019-01-01 RX ADMIN — SERTRALINE HYDROCHLORIDE 50 MG: 50 TABLET ORAL at 08:27

## 2019-01-01 RX ADMIN — TORSEMIDE 40 MG: 20 TABLET ORAL at 13:51

## 2019-01-01 RX ADMIN — RANOLAZINE 1000 MG: 500 TABLET, FILM COATED, EXTENDED RELEASE ORAL at 08:48

## 2019-01-01 RX ADMIN — WARFARIN SODIUM 4 MG: 4 TABLET ORAL at 17:47

## 2019-01-01 RX ADMIN — WARFARIN SODIUM 4 MG: 4 TABLET ORAL at 18:47

## 2019-01-01 RX ADMIN — ASPIRIN 81 MG CHEWABLE TABLET 81 MG: 81 TABLET CHEWABLE at 08:47

## 2019-01-01 RX ADMIN — RANOLAZINE 500 MG: 500 TABLET, FILM COATED, EXTENDED RELEASE ORAL at 07:31

## 2019-01-01 RX ADMIN — POTASSIUM CHLORIDE 20 MEQ: 750 TABLET, EXTENDED RELEASE ORAL at 18:15

## 2019-01-01 RX ADMIN — FUROSEMIDE 40 MG: 40 TABLET ORAL at 09:15

## 2019-01-01 RX ADMIN — HEPARIN SODIUM 700 UNITS/HR: 10000 INJECTION, SOLUTION INTRAVENOUS at 17:41

## 2019-01-01 RX ADMIN — HYDRALAZINE HYDROCHLORIDE 10 MG: 10 TABLET, FILM COATED ORAL at 22:34

## 2019-01-01 RX ADMIN — AMIODARONE HYDROCHLORIDE 400 MG: 200 TABLET ORAL at 08:00

## 2019-01-01 RX ADMIN — FUROSEMIDE 20 MG: 10 INJECTION, SOLUTION INTRAVENOUS at 22:56

## 2019-01-01 RX ADMIN — MAGNESIUM SULFATE HEPTAHYDRATE 2 G: 40 INJECTION, SOLUTION INTRAVENOUS at 00:00

## 2019-01-01 RX ADMIN — SODIUM PHOSPHATE, MONOBASIC, MONOHYDRATE AND SODIUM PHOSPHATE, DIBASIC, ANHYDROUS 10 MMOL: 276; 142 INJECTION, SOLUTION INTRAVENOUS at 08:30

## 2019-01-01 RX ADMIN — PANTOPRAZOLE SODIUM 40 MG: 40 TABLET, DELAYED RELEASE ORAL at 15:12

## 2019-01-01 RX ADMIN — POTASSIUM CHLORIDE 20 MEQ: 29.8 INJECTION, SOLUTION INTRAVENOUS at 04:26

## 2019-01-01 RX ADMIN — AMIODARONE HYDROCHLORIDE 400 MG: 200 TABLET ORAL at 09:15

## 2019-01-01 RX ADMIN — WARFARIN SODIUM 1 MG: 1 TABLET ORAL at 18:35

## 2019-01-01 RX ADMIN — ASPIRIN 81 MG CHEWABLE TABLET 81 MG: 81 TABLET CHEWABLE at 07:26

## 2019-01-01 RX ADMIN — RANOLAZINE 500 MG: 500 TABLET, FILM COATED, EXTENDED RELEASE ORAL at 15:49

## 2019-01-01 RX ADMIN — CEFDINIR 300 MG: 300 CAPSULE ORAL at 22:29

## 2019-01-01 RX ADMIN — CYCLOBENZAPRINE HYDROCHLORIDE 10 MG: 10 TABLET, FILM COATED ORAL at 21:20

## 2019-01-01 RX ADMIN — POTASSIUM CHLORIDE 20 MEQ: 29.8 INJECTION, SOLUTION INTRAVENOUS at 05:39

## 2019-01-01 RX ADMIN — SERTRALINE HYDROCHLORIDE 50 MG: 50 TABLET ORAL at 07:56

## 2019-01-01 RX ADMIN — HYDRALAZINE HYDROCHLORIDE 10 MG: 10 TABLET, FILM COATED ORAL at 14:15

## 2019-01-01 RX ADMIN — FUROSEMIDE 20 MG: 20 TABLET ORAL at 07:29

## 2019-01-01 RX ADMIN — AMIODARONE HYDROCHLORIDE 400 MG: 200 TABLET ORAL at 08:55

## 2019-01-01 RX ADMIN — BUMETANIDE 1 MG: 0.25 INJECTION INTRAMUSCULAR; INTRAVENOUS at 18:34

## 2019-01-01 RX ADMIN — POTASSIUM CHLORIDE 20 MEQ: 750 TABLET, EXTENDED RELEASE ORAL at 17:54

## 2019-01-01 RX ADMIN — GUAIFENESIN 600 MG: 600 TABLET, EXTENDED RELEASE ORAL at 13:35

## 2019-01-01 RX ADMIN — RANOLAZINE 500 MG: 500 TABLET, FILM COATED, EXTENDED RELEASE ORAL at 07:57

## 2019-01-01 RX ADMIN — HYDROMORPHONE HYDROCHLORIDE 0.5 MG: 1 INJECTION, SOLUTION INTRAMUSCULAR; INTRAVENOUS; SUBCUTANEOUS at 03:47

## 2019-01-01 RX ADMIN — ASPIRIN 81 MG CHEWABLE TABLET 81 MG: 81 TABLET CHEWABLE at 08:55

## 2019-01-01 RX ADMIN — FUROSEMIDE 40 MG: 40 TABLET ORAL at 08:31

## 2019-01-01 RX ADMIN — RANOLAZINE 500 MG: 500 TABLET, FILM COATED, EXTENDED RELEASE ORAL at 21:06

## 2019-01-01 RX ADMIN — GUAIFENESIN 600 MG: 600 TABLET, EXTENDED RELEASE ORAL at 07:31

## 2019-01-01 RX ADMIN — HEPARIN SODIUM 700 UNITS/HR: 10000 INJECTION, SOLUTION INTRAVENOUS at 20:09

## 2019-01-01 RX ADMIN — AMIODARONE HYDROCHLORIDE 400 MG: 200 TABLET ORAL at 07:40

## 2019-01-01 RX ADMIN — HYDRALAZINE HYDROCHLORIDE 10 MG: 10 TABLET, FILM COATED ORAL at 11:54

## 2019-01-01 RX ADMIN — ASPIRIN 81 MG CHEWABLE TABLET 81 MG: 81 TABLET CHEWABLE at 07:40

## 2019-01-01 RX ADMIN — CEFAZOLIN SODIUM 1 G: 1 INJECTION, POWDER, FOR SOLUTION INTRAMUSCULAR; INTRAVENOUS at 19:27

## 2019-01-01 RX ADMIN — SERTRALINE HYDROCHLORIDE 50 MG: 50 TABLET ORAL at 08:31

## 2019-01-01 RX ADMIN — AMIODARONE HYDROCHLORIDE 400 MG: 200 TABLET ORAL at 07:55

## 2019-01-01 RX ADMIN — HYDRALAZINE HYDROCHLORIDE 10 MG: 10 TABLET, FILM COATED ORAL at 21:20

## 2019-01-01 RX ADMIN — CEFAZOLIN SODIUM 1 G: 1 INJECTION, POWDER, FOR SOLUTION INTRAMUSCULAR; INTRAVENOUS at 09:22

## 2019-01-01 RX ADMIN — AMIODARONE HYDROCHLORIDE 1 MG/MIN: 50 INJECTION, SOLUTION INTRAVENOUS at 02:54

## 2019-01-01 RX ADMIN — CEFAZOLIN SODIUM 1 G: 1 INJECTION, POWDER, FOR SOLUTION INTRAMUSCULAR; INTRAVENOUS at 07:53

## 2019-01-01 RX ADMIN — SERTRALINE HYDROCHLORIDE 50 MG: 50 TABLET ORAL at 07:40

## 2019-01-01 RX ADMIN — BUPIVACAINE HYDROCHLORIDE 30 ML: 2.5 INJECTION, SOLUTION EPIDURAL; INFILTRATION; INTRACAUDAL at 12:36

## 2019-01-01 RX ADMIN — RANOLAZINE 500 MG: 500 TABLET, FILM COATED, EXTENDED RELEASE ORAL at 20:31

## 2019-01-01 RX ADMIN — ASPIRIN 81 MG CHEWABLE TABLET 81 MG: 81 TABLET CHEWABLE at 07:56

## 2019-01-01 RX ADMIN — RANOLAZINE 1000 MG: 500 TABLET, FILM COATED, EXTENDED RELEASE ORAL at 20:06

## 2019-01-01 RX ADMIN — FUROSEMIDE 20 MG: 10 INJECTION, SOLUTION INTRAVENOUS at 06:52

## 2019-01-01 RX ADMIN — MAGNESIUM SULFATE IN DEXTROSE 1 G: 10 INJECTION, SOLUTION INTRAVENOUS at 22:16

## 2019-01-01 RX ADMIN — HEPARIN SODIUM 700 UNITS/HR: 10000 INJECTION, SOLUTION INTRAVENOUS at 12:51

## 2019-01-01 RX ADMIN — GUAIFENESIN 600 MG: 600 TABLET, EXTENDED RELEASE ORAL at 19:27

## 2019-01-01 RX ADMIN — SERTRALINE HYDROCHLORIDE 50 MG: 50 TABLET ORAL at 08:13

## 2019-01-01 RX ADMIN — POTASSIUM CHLORIDE 20 MEQ: 750 TABLET, EXTENDED RELEASE ORAL at 17:06

## 2019-01-01 RX ADMIN — SODIUM PHOSPHATE, MONOBASIC, MONOHYDRATE AND SODIUM PHOSPHATE, DIBASIC, ANHYDROUS 10 MMOL: 276; 142 INJECTION, SOLUTION INTRAVENOUS at 05:41

## 2019-01-01 RX ADMIN — CEFDINIR 300 MG: 300 CAPSULE ORAL at 20:37

## 2019-01-01 RX ADMIN — SERTRALINE HYDROCHLORIDE 50 MG: 50 TABLET ORAL at 09:15

## 2019-01-01 RX ADMIN — AMIODARONE HYDROCHLORIDE 400 MG: 200 TABLET ORAL at 07:29

## 2019-01-01 RX ADMIN — AMIODARONE HYDROCHLORIDE 400 MG: 200 TABLET ORAL at 08:17

## 2019-01-01 RX ADMIN — AMIODARONE HYDROCHLORIDE 400 MG: 200 TABLET ORAL at 07:26

## 2019-01-01 RX ADMIN — LIDOCAINE HYDROCHLORIDE 5 ML: 10 INJECTION, SOLUTION EPIDURAL; INFILTRATION; INTRACAUDAL; PERINEURAL at 11:47

## 2019-01-01 RX ADMIN — Medication 6.25 MG: at 21:31

## 2019-01-01 RX ADMIN — Medication 5 ML: at 17:34

## 2019-01-01 RX ADMIN — CEFDINIR 300 MG: 300 CAPSULE ORAL at 20:39

## 2019-01-01 RX ADMIN — POTASSIUM CHLORIDE 20 MEQ: 750 TABLET, EXTENDED RELEASE ORAL at 04:53

## 2019-01-01 RX ADMIN — WARFARIN SODIUM 3 MG: 3 TABLET ORAL at 17:36

## 2019-01-01 RX ADMIN — HEPARIN SODIUM 700 UNITS/HR: 10000 INJECTION, SOLUTION INTRAVENOUS at 00:17

## 2019-01-01 RX ADMIN — RANOLAZINE 500 MG: 500 TABLET, FILM COATED, EXTENDED RELEASE ORAL at 08:13

## 2019-01-01 RX ADMIN — DOCUSATE SODIUM 100 MG: 100 CAPSULE, LIQUID FILLED ORAL at 19:57

## 2019-01-01 RX ADMIN — AMIODARONE HYDROCHLORIDE 400 MG: 200 TABLET ORAL at 08:13

## 2019-01-01 RX ADMIN — FUROSEMIDE 40 MG: 40 TABLET ORAL at 08:13

## 2019-01-01 RX ADMIN — ASPIRIN 81 MG CHEWABLE TABLET 81 MG: 81 TABLET CHEWABLE at 08:12

## 2019-01-01 RX ADMIN — LIDOCAINE 1 PATCH: 560 PATCH PERCUTANEOUS; TOPICAL; TRANSDERMAL at 17:06

## 2019-01-01 RX ADMIN — GUAIFENESIN 600 MG: 600 TABLET, EXTENDED RELEASE ORAL at 08:31

## 2019-01-01 RX ADMIN — WARFARIN SODIUM 3 MG: 3 TABLET ORAL at 17:56

## 2019-01-01 RX ADMIN — FUROSEMIDE 40 MG: 10 INJECTION, SOLUTION INTRAVENOUS at 01:35

## 2019-01-01 RX ADMIN — GUAIFENESIN 600 MG: 600 TABLET, EXTENDED RELEASE ORAL at 20:31

## 2019-01-01 RX ADMIN — RANOLAZINE 1000 MG: 500 TABLET, FILM COATED, EXTENDED RELEASE ORAL at 20:38

## 2019-01-01 RX ADMIN — ASPIRIN 81 MG CHEWABLE TABLET 81 MG: 81 TABLET CHEWABLE at 07:49

## 2019-01-01 RX ADMIN — DOCUSATE SODIUM 100 MG: 100 CAPSULE, LIQUID FILLED ORAL at 19:29

## 2019-01-01 RX ADMIN — TORSEMIDE 40 MG: 20 TABLET ORAL at 22:34

## 2019-01-01 RX ADMIN — RANOLAZINE 500 MG: 500 TABLET, FILM COATED, EXTENDED RELEASE ORAL at 19:30

## 2019-01-01 RX ADMIN — SODIUM CHLORIDE, POTASSIUM CHLORIDE, SODIUM LACTATE AND CALCIUM CHLORIDE 250 ML: 600; 310; 30; 20 INJECTION, SOLUTION INTRAVENOUS at 02:46

## 2019-01-01 RX ADMIN — FUROSEMIDE 20 MG: 20 TABLET ORAL at 14:09

## 2019-01-01 RX ADMIN — CARVEDILOL 3.12 MG: 3.12 TABLET, FILM COATED ORAL at 17:18

## 2019-01-01 RX ADMIN — HUMAN INSULIN 5 UNITS: 100 INJECTION, SOLUTION SUBCUTANEOUS at 01:00

## 2019-01-01 RX ADMIN — RANOLAZINE 1000 MG: 500 TABLET, FILM COATED, EXTENDED RELEASE ORAL at 08:01

## 2019-01-01 RX ADMIN — TORSEMIDE 20 MG: 20 TABLET ORAL at 12:39

## 2019-01-01 RX ADMIN — FUROSEMIDE 40 MG: 10 INJECTION, SOLUTION INTRAVENOUS at 23:32

## 2019-01-01 RX ADMIN — SERTRALINE HYDROCHLORIDE 50 MG: 50 TABLET ORAL at 08:00

## 2019-01-01 RX ADMIN — AMIODARONE HYDROCHLORIDE 400 MG: 200 TABLET ORAL at 08:31

## 2019-01-01 RX ADMIN — RANOLAZINE 500 MG: 500 TABLET, FILM COATED, EXTENDED RELEASE ORAL at 08:56

## 2019-01-01 RX ADMIN — DEXTROSE MONOHYDRATE 25 ML: 500 INJECTION PARENTERAL at 02:57

## 2019-01-01 RX ADMIN — SERTRALINE HYDROCHLORIDE 50 MG: 50 TABLET ORAL at 07:49

## 2019-01-01 RX ADMIN — DOCUSATE SODIUM 100 MG: 100 CAPSULE, LIQUID FILLED ORAL at 20:08

## 2019-01-01 RX ADMIN — AMIODARONE HYDROCHLORIDE 400 MG: 200 TABLET ORAL at 19:27

## 2019-01-01 RX ADMIN — FUROSEMIDE 40 MG: 40 TABLET ORAL at 07:30

## 2019-01-01 RX ADMIN — BUMETANIDE 1 MG: 0.25 INJECTION INTRAMUSCULAR; INTRAVENOUS at 01:30

## 2019-01-01 RX ADMIN — ASPIRIN 81 MG CHEWABLE TABLET 81 MG: 81 TABLET CHEWABLE at 08:17

## 2019-01-01 RX ADMIN — CYCLOBENZAPRINE HYDROCHLORIDE 10 MG: 10 TABLET, FILM COATED ORAL at 16:05

## 2019-01-01 RX ADMIN — DOCUSATE SODIUM 100 MG: 100 CAPSULE, LIQUID FILLED ORAL at 07:40

## 2019-01-01 RX ADMIN — ASPIRIN 81 MG CHEWABLE TABLET 81 MG: 81 TABLET CHEWABLE at 10:23

## 2019-01-01 RX ADMIN — SERTRALINE HYDROCHLORIDE 50 MG: 50 TABLET ORAL at 08:47

## 2019-01-01 RX ADMIN — FUROSEMIDE 20 MG: 10 INJECTION, SOLUTION INTRAVENOUS at 10:30

## 2019-01-01 RX ADMIN — SERTRALINE HYDROCHLORIDE 50 MG: 50 TABLET ORAL at 07:31

## 2019-01-01 RX ADMIN — WARFARIN SODIUM 3 MG: 3 TABLET ORAL at 17:06

## 2019-01-01 RX ADMIN — HEPARIN SODIUM 550 UNITS/HR: 10000 INJECTION, SOLUTION INTRAVENOUS at 12:34

## 2019-01-01 RX ADMIN — AMIODARONE HYDROCHLORIDE 1 MG/MIN: 50 INJECTION, SOLUTION INTRAVENOUS at 06:47

## 2019-01-01 RX ADMIN — LIDOCAINE 1 PATCH: 560 PATCH PERCUTANEOUS; TOPICAL; TRANSDERMAL at 18:16

## 2019-01-01 RX ADMIN — MAGNESIUM SULFATE IN WATER 2 G: 40 INJECTION, SOLUTION INTRAVENOUS at 05:41

## 2019-01-01 RX ADMIN — AMIODARONE HYDROCHLORIDE 400 MG: 200 TABLET ORAL at 20:08

## 2019-01-01 RX ADMIN — Medication 5 MG: at 22:50

## 2019-01-01 RX ADMIN — SODIUM NITROPRUSSIDE 0.25 MCG/KG/MIN: 25 INJECTION INTRAVENOUS at 07:53

## 2019-01-01 RX ADMIN — SERTRALINE HYDROCHLORIDE 50 MG: 50 TABLET ORAL at 07:55

## 2019-01-01 RX ADMIN — RANOLAZINE 500 MG: 500 TABLET, FILM COATED, EXTENDED RELEASE ORAL at 20:37

## 2019-01-01 RX ADMIN — RANOLAZINE 1000 MG: 500 TABLET, FILM COATED, EXTENDED RELEASE ORAL at 07:55

## 2019-01-01 RX ADMIN — CEFAZOLIN SODIUM 1 G: 1 INJECTION, POWDER, FOR SOLUTION INTRAMUSCULAR; INTRAVENOUS at 07:28

## 2019-01-01 RX ADMIN — HYDRALAZINE HYDROCHLORIDE 10 MG: 10 TABLET, FILM COATED ORAL at 22:50

## 2019-01-01 RX ADMIN — BUMETANIDE 1 MG: 0.25 INJECTION INTRAMUSCULAR; INTRAVENOUS at 08:47

## 2019-01-01 RX ADMIN — RANOLAZINE 500 MG: 500 TABLET, FILM COATED, EXTENDED RELEASE ORAL at 07:58

## 2019-01-01 ASSESSMENT — ACTIVITIES OF DAILY LIVING (ADL)
ADLS_ACUITY_SCORE: 16
ADLS_ACUITY_SCORE: 16
ADLS_ACUITY_SCORE: 15
ADLS_ACUITY_SCORE: 13
ADLS_ACUITY_SCORE: 15
ADLS_ACUITY_SCORE: 15
PREVIOUS_RESPONSIBILITIES: MEAL PREP;HOUSEKEEPING;LAUNDRY;SHOPPING;YARDWORK;MEDICATION MANAGEMENT;FINANCES;DRIVING
ADLS_ACUITY_SCORE: 13
ADLS_ACUITY_SCORE: 15
ADLS_ACUITY_SCORE: 17
ADLS_ACUITY_SCORE: 15
ADLS_ACUITY_SCORE: 15
ADLS_ACUITY_SCORE: 16
ADLS_ACUITY_SCORE: 15
ADLS_ACUITY_SCORE: 15
ADLS_ACUITY_SCORE: 17
ADLS_ACUITY_SCORE: 16
ADLS_ACUITY_SCORE: 15
ADLS_ACUITY_SCORE: 13
ADLS_ACUITY_SCORE: 15
ADLS_ACUITY_SCORE: 16
ADLS_ACUITY_SCORE: 15
ADLS_ACUITY_SCORE: 16
ADLS_ACUITY_SCORE: 15
ADLS_ACUITY_SCORE: 16
ADLS_ACUITY_SCORE: 15
ADLS_ACUITY_SCORE: 17
ADLS_ACUITY_SCORE: 15
ADLS_ACUITY_SCORE: 16
ADLS_ACUITY_SCORE: 15
ADLS_ACUITY_SCORE: 13
ADLS_ACUITY_SCORE: 15
ADLS_ACUITY_SCORE: 15
ADLS_ACUITY_SCORE: 16
ADLS_ACUITY_SCORE: 16
ADLS_ACUITY_SCORE: 15
ADLS_ACUITY_SCORE: 15
ADLS_ACUITY_SCORE: 16
ADLS_ACUITY_SCORE: 13
ADLS_ACUITY_SCORE: 15
ADLS_ACUITY_SCORE: 18
ADLS_ACUITY_SCORE: 17
ADLS_ACUITY_SCORE: 15
ADLS_ACUITY_SCORE: 13
ADLS_ACUITY_SCORE: 15
ADLS_ACUITY_SCORE: 16
ADLS_ACUITY_SCORE: 14
ADLS_ACUITY_SCORE: 15
ADLS_ACUITY_SCORE: 13
ADLS_ACUITY_SCORE: 16
ADLS_ACUITY_SCORE: 15
ADLS_ACUITY_SCORE: 15
ADLS_ACUITY_SCORE: 16
ADLS_ACUITY_SCORE: 13
ADLS_ACUITY_SCORE: 15
ADLS_ACUITY_SCORE: 15
ADLS_ACUITY_SCORE: 13
ADLS_ACUITY_SCORE: 15
ADLS_ACUITY_SCORE: 16
ADLS_ACUITY_SCORE: 17
ADLS_ACUITY_SCORE: 16
ADLS_ACUITY_SCORE: 15
ADLS_ACUITY_SCORE: 15
ADLS_ACUITY_SCORE: 17
ADLS_ACUITY_SCORE: 15

## 2019-01-01 ASSESSMENT — MIFFLIN-ST. JEOR
SCORE: 912.5
SCORE: 943.5
SCORE: 831.5
SCORE: 916.44
SCORE: 867.5
SCORE: 918.7
SCORE: 929.5
SCORE: 915.5
SCORE: 897.3
SCORE: 910.5
SCORE: 895.5
SCORE: 859.5
SCORE: 906.91
SCORE: 938.5

## 2019-01-01 NOTE — PLAN OF CARE
SLP consult received. RN reports the patient has passed her swallowing screen and is swallowing without any difficulty so SLP evaluation is not indicated. SLP will sign off at this time, please reconsult if needed.

## 2019-01-01 NOTE — PROGRESS NOTES
[facility] Cardiology Progress Note           Assessment and Plan:   Nicolette Adan is a 68 year old female with history of HFrEF 2/2 nonischemic dilated cardiomyopathy (EF 15%, 12/21/18), rheumatic heart disease s/p mechanical MV and AV replacement, atrial fibrillation, and sudden cardiac arrest ventricular fibrillation in 2008 s/p dual chamber ICD with recurrent ICD shocks,  She presented to [facility] for epigastric discomfort and was found to have recurrent VT with ICD shocks. Transferred to [facility] with refractory VT and for consideration for advanced therapies.    Since patient has been here she has been quite regarding VT.  IABP was placed due to hypotension despite vasopressin.  She was extubated 12/31.  No real sustained ectopy while weaning IABP, but will remain in place until AM.     # VT storm  # Hx of cardiac arrest 2/2 VF (2008), s/p dual chamber ICD with recurrent shocks  # Prolonged QT  Multiple recent shocks from ICD including few days prior had syncopal episode and was shocked. Prolonged QT seen at outside hospital - PTA meds stopped at OSH: prozac, lexapro, digoxin. Poor candidate for VT ablation due to mechanical aortic and mitral valves. Angiogram done on 12/24/18 with normal coronary arteries. Transferred on amiodarone drip, lidocaine gtt and propranolol TID.  Lidocaine stopped due to elevated level.  IABP placed on admission here 12/30   - weaning IABP, potentially could remove this tomorrow   - attempted to pace at higher atrial rate, however MAP did not improve and her VT is quite slow    - switch amiodarone gtt to oral 400mg BID   - swan daphne catheter, adjusted with fluoro   - Vasopressin as needed   - Monitor lytes, on replacement protocol   - pacer interrogation and EP consult     # HFrEF exacerbation, EF 15% 12/21/18  # Non ischemic dilated cardiomyopathy  Sadaf at outside hospital 1.9 with CO 2.75.  BNP on admission 5K with trop 0.1. On lasix drip prior to transfer.   Angiogram without obstructive disease   - Continue ASA   - Previously on coreg and losartan - both stopped recently due to hypotension   - given 20mg IV lasix with brisk response   - TTE as below    # Rheumatic heart disease   # Mitral valve replacement, 29mm St. Reece 1989  # Aortic valve replacement, 23 mm St. Reece 1998  # Atrial fibrillation, CHADSVASc ~ 4, on warfarin goal 2.5-3.5  Previously on warfarin. Paced on tele. INR 2.4 on admission. holding warfarin   - Heparin gtt   - Monitor INR     # NANCY on CKD III  # Hyponatremia  Cr 2.19 on admission. Baseline 1.4. Lactate WNL. Previously on lasix gtt. Electrolyte imbalance likely secondary to hypervolemia. Previously on digoxin which was stopped due to her NANCY.    - Monitor cr   - Cautious use of fluids vs diuresis, monitor CVP for fluid status     # Anemia  Secondary to CKD and chronic disease. No sign of active bleed   - Monitor CBC     Chronic issues:   - Depression: Continue sertraline   - DM: Monitor sugars, hypoglycemia protocol     CODE: FULL  Diet/IVF: speech study  DVT ppx: Heparin gtt  Disposition/Admission Status: Inpatient     Patient was discussed with Dr. Greenwood.  Family updated in person today.    Jean Marie Duke MD PGY5  Cardiology Fellow       Subjective:     Extubated.  Feels ok.  Hoarse voice.  No chest discomfort          Medications:       aspirin  81 mg Oral Daily     docusate  100 mg Oral or Feeding Tube BID     pantoprazole  40 mg Per NG tube QAM AC     sertraline  50 mg Oral Daily     sodium chloride (PF)  3 mL Intracatheter Q8H       amiodarone 0.5 mg/min (01/01/19 0700)     HEParin 700 Units/hr (01/01/19 0700)     - MEDICATION INSTRUCTIONS -       - MEDICATION INSTRUCTIONS -       vasopressin (PITRESSIN) infusion ADULT (40 mL) Stopped (01/01/19 0200)            Objective:          Physical Exam:   Temp:  [97  F (36.1  C)-99.7  F (37.6  C)] 97.9  F (36.6  C)  Heart Rate:  [69-71] 69  Resp:  [16-24] 16  MAP:  [58 mmHg-79 mmHg] 64  mmHg  Arterial Line BP: ()/(36-60) 98/43  FiO2 (%):  [40 %] 40 %  SpO2:  [91 %-100 %] 99 %  I/O last 3 completed shifts:  In: 1314.43 [P.O.:240; I.V.:984.43; NG/GT:90]  Out: 695 [Urine:695]    Vitals:    12/29/18 2100 12/30/18 0000 12/31/18 0000 01/01/19 0400   Weight: 49 kg (108 lb 0.4 oz) 49 kg (108 lb 0.4 oz) 48.1 kg (106 lb 0.7 oz) 47.8 kg (105 lb 6.1 oz)     CVP 14  PA 46/24  MVO2 66  CO/CI 4.3/3    Augmented MAP 75    GEN: alert and oriented  HEENT:  swan  CV:  Regular rate and rhythm, holosystolic murmur, click  LUNGS:  Extubated, on nasal canula, inspiratory rales at bases  GROIN: femoral IABP  ABD:  Active bowel sounds, soft, non-tender/non-distended.  No rebound/guarding/rigidity.  EXT:  No edema or cyanosis.  Hands/feet warm to touch with good signs of peripheral perfusion.          Data:   BMP  Recent Labs   Lab 01/01/19  0326 12/31/18  2048 12/31/18  1952 12/31/18  1536  12/31/18  0700  12/30/18  1633     --   --  134  --  131*  --  131*   POTASSIUM 4.2 4.2 2.0* 4.7   < > 4.1  Canceled, Test credited   < > 3.8   CHLORIDE 101  --   --  102  --  97  --  94   KARINA 8.1*  --   --  7.5*  --  8.2*  --  8.2*   CO2 27  --   --  27  --  24  --  27   BUN 28  --   --  28  --  27  --  31*   CR 1.42*  --   --  1.42*  --  1.58*  --  1.96*   GLC 75  --   --  118*  --  102*  --  110*    < > = values in this interval not displayed.     LFTs  Recent Labs   Lab 12/30/18  1150 12/30/18  0110   ALKPHOS 53 57   AST 45 56*   ALT 84* 92*   BILITOTAL 1.2 1.0   PROTTOTAL 7.0 7.2   ALBUMIN 3.6 3.7      CBC  Recent Labs   Lab 12/30/18  1150 12/30/18  0447 12/30/18  0110   WBC 9.2 8.6 4.9   RBC 3.14* 3.20* 3.23*   HGB 9.3* 9.2* 9.3*   HCT 27.7* 28.4* 29.1*   MCV 88 89 90   MCH 29.6 28.8 28.8   MCHC 33.6 32.4 32.0   RDW 15.2* 14.8 14.7    170 153     INR  Recent Labs   Lab 01/01/19  0326 12/30/18  1150 12/30/18  0110   INR 1.85* 2.44* 2.49*     Echo 12/30  Left Ventricle  Left ventricular wall thickness is  normal. Severe left ventricular dilation is  present. Severely (EF 10-20%) reduced left ventricular function is present.  Diastolic function not assessed due to presence of prosthetic mitral valve.  Severe diffuse hypokinesis is present.     Right Ventricle  Mild to moderate right ventricular dilation is present. Global right  ventricular function is moderately to severely reduced. A pacemaker lead is  noted in the right ventricle.     Atria  The atria cannot be assessed.     Mitral Valve  s/p 29mm St. Reece MVR(1989). The mean mitral valve gradient is 3.8 mmHg.  Prosthetic valve is well seated, no paravalvular leak and has normal  gradients.        Aortic Valve  s/p 23 mm St. Reece AVR (1998). increased gradient across AV Mean gradient of  23mmHg, peak gradient of 43, Peak velocity of 3.2m/s.     Tricuspid Valve  The tricuspid valve is normal. Mild tricuspid insufficiency is present. Right  ventricular systolic pressure is 25mmHg above the right atrial pressure.     Pulmonic Valve  The pulmonic valve is normal. Trace pulmonic insufficiency is present.     Vessels  Visualized poriton of the aorta are normal in size (proximal ascending aorta  measures 3cm). The pulmonary artery is normal. Unable to assess mean RA  pressure given the patient is on a ventilator. IVC is dilated.     Pericardium  No pericardial effusion is present.

## 2019-01-01 NOTE — PLAN OF CARE
D: VT/Cardiogenic shock  I/A: Neuro: A&O x4; Calls appropriately  Resp: Sats >90% on 2L NC. Denies shortness of breath  Card: Paced @70p; MAP goal >60. Vaso titrated off  Balloon Pump: 1:2; Augmented diastolic ~120-130; Augmentation 80%  Hemodynamics: CVP 14; PA 46/24; SvO2 66; CI 3; CO 4.3;   GI: 2g Na diet  : Dukes; UOP 20-30; Cards aware - continue to monitor    P: Continue to monitor; possible IABP removal today.

## 2019-01-01 NOTE — PROGRESS NOTES
SPIRITUAL HEALTH SERVICES  SPIRITUAL ASSESSMENT Progress Note  [facility] 4E     Pt had not been screened for Spiritual Health Services. I introduced myself to pt's family outside of room, oriented them to Spiritual Health Services. Family declined  support at this time, know they can request it at any time.    PLAN: no further needs indicated at this time;  support available if requested.    Haroon Velasquez) Jonh Rogers M.Div., [facility]  Staff   Pager [phone]

## 2019-01-02 NOTE — PROGRESS NOTES
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 Transferred to [facility] with refractory VT and for consideration for advanced therapies.    Since patient has been here she has been quite regarding VT.  IABP was placed due to hypotension despite vasopressin.  She was extubated 12/31.  Morning of 1/2, patient had 60 second run of VT.  EKG not able to capture run, but ectopy appeared to be RBBB in nature, inferior axis, potentially LVOT focus.     # VT storm  # Hx of cardiac arrest 2/2 VF (2008), s/p dual chamber ICD with recurrent shocks  # Prolonged QT  Multiple recent shocks from ICD including few days prior had syncopal episode and was shocked. Prolonged QT seen at outside hospital - PTA meds stopped at OSH: prozac, lexapro, digoxin. Poor candidate for VT ablation due to mechanical aortic and mitral valves. Angiogram done on 12/24/18 with normal coronary arteries. Transferred on amiodarone drip, lidocaine gtt and propranolol TID.  Lidocaine stopped due to elevated level.  IABP placed on admission here 12/30   - weaning IABP, potentially could remove this today   - attempted to pace at higher atrial rate, however MAP did not improve and her VT is quite slow    - switch amiodarone gtt to oral 400mg BID   - swan daphne catheter, adjusted with fluoro   - Vasopressin as needed   - Monitor lytes, on replacement protocol   - EP consult     # HFrEF exacerbation, EF 15% 12/21/18  # Non ischemic dilated cardiomyopathy   - Continue ASA   - Previously on coreg and losartan - both  stopped recently due to hypotension   - given 20mg IV lasix with brisk response   - TTE as below    # Rheumatic heart disease   # Mitral valve replacement, 29mm St. Reece 1989  # Aortic valve replacement, 23 mm St. Reece 1998  # Atrial fibrillation, CHADSVASc ~ 4, on warfarin goal 2.5-3.5  Previously on warfarin. Paced on tele. INR 2.4 on admission. holding warfarin   - Heparin gtt   - Monitor INR     # NANCY on CKD III  # Hyponatremia  Cr 2.19 on admission. Baseline 1.4. Lactate WNL. Previously on lasix gtt. Electrolyte imbalance likely secondary to hypervolemia. Previously on digoxin which was stopped due to her NANCY.    - Monitor cr   - Cautious use of fluids vs diuresis, monitor CVP for fluid status     # Anemia  Secondary to CKD and chronic disease. No sign of active bleed   - Monitor CBC     Chronic issues:   - Depression: Continue sertraline   - DM: Monitor sugars, hypoglycemia protocol   - Non-severe malnutrition in the context of acute on chronic illness - seeing nutrition here     CODE: FULL  Diet/IVF: speech study  DVT ppx: Heparin gtt  Disposition/Admission Status: Inpatient     Patient was discussed with Dr. Greenwood.  Family updated in person today.    Jean Marie Duke MD PGY5  Cardiology Fellow       Subjective:     Extubated.  Feels ok.  Hoarse voice.  No chest discomfort          Medications:       amiodarone  400 mg Oral BID     aspirin  81 mg Oral Daily     docusate sodium  100 mg Oral BID     pantoprazole  40 mg Oral QAM AC     sertraline  50 mg Oral Daily     sodium chloride (PF)  3 mL Intracatheter Q8H       HEParin Stopped (01/02/19 0430)     - MEDICATION INSTRUCTIONS -       - MEDICATION INSTRUCTIONS -       vasopressin (PITRESSIN) infusion ADULT (40 mL) Stopped (01/01/19 0200)            Objective:          Physical Exam:   Temp:  [97.7  F (36.5  C)-99.7  F (37.6  C)] 98.2  F (36.8  C)  Heart Rate:  [69-70] 70  Resp:  [14-22] 14  MAP:  [55 mmHg-81 mmHg] 70 mmHg  Arterial Line BP: ()/(9-56)  91/41  SpO2:  [92 %-100 %] 94 %  I/O last 3 completed shifts:  In: 1370.5 [P.O.:600; I.V.:770.5]  Out: 2567 [Urine:2567]    Vitals:    12/29/18 2100 12/30/18 0000 12/31/18 0000 01/01/19 0400   Weight: 49 kg (108 lb 0.4 oz) 49 kg (108 lb 0.4 oz) 48.1 kg (106 lb 0.7 oz) 47.8 kg (105 lb 6.1 oz)    01/02/19 0400   Weight: 48.7 kg (107 lb 5.8 oz)     CVP 14  PA 46/24  MVO2 66  CO/CI 4.3/3    Augmented MAP 75    GEN: alert and oriented  HEENT:  swan  CV:  Regular rate and rhythm, holosystolic murmur, click  LUNGS:  Extubated, on nasal canula, inspiratory rales at bases  GROIN: femoral IABP  ABD:  Active bowel sounds, soft, non-tender/non-distended.  No rebound/guarding/rigidity.  EXT:  No edema or cyanosis.  Hands/feet warm to touch with good signs of peripheral perfusion.          Data:   BMP  Recent Labs   Lab 01/02/19  0345 01/01/19  1748 01/01/19  1119 01/01/19  0735 01/01/19  0326  12/31/18  1536    136  --   --  134  --  134   POTASSIUM 3.8 4.8 3.5 4.2 4.2   < > 4.7   CHLORIDE 100 102  --   --  101  --  102   KARINA 8.4* 8.5  --   --  8.1*  --  7.5*   CO2 29 26  --   --  27  --  27   BUN 26 28  --   --  28  --  28   CR 1.32* 1.36*  --   --  1.42*  --  1.42*   GLC 97 99  --   --  75  --  118*    < > = values in this interval not displayed.     LFTs  Recent Labs   Lab 12/30/18  1150 12/30/18  0110   ALKPHOS 53 57   AST 45 56*   ALT 84* 92*   BILITOTAL 1.2 1.0   PROTTOTAL 7.0 7.2   ALBUMIN 3.6 3.7      CBC  Recent Labs   Lab 01/02/19  0345 12/30/18  1150 12/30/18  0447 12/30/18  0110   WBC 9.0 9.2 8.6 4.9   RBC 3.12* 3.14* 3.20* 3.23*   HGB 9.0* 9.3* 9.2* 9.3*   HCT 29.5* 27.7* 28.4* 29.1*   MCV 95 88 89 90   MCH 28.8 29.6 28.8 28.8   MCHC 30.5* 33.6 32.4 32.0   RDW 15.4* 15.2* 14.8 14.7    183 170 153     INR  Recent Labs   Lab 01/02/19  0345 01/01/19  0326 12/31/18  0346 12/30/18  1150   INR 1.46* 1.85* 2.66* 2.44*     Echo 12/30  Left Ventricle  Left ventricular wall thickness is normal. Severe left  ventricular dilation is  present. Severely (EF 10-20%) reduced left ventricular function is present.  Diastolic function not assessed due to presence of prosthetic mitral valve.  Severe diffuse hypokinesis is present.     Right Ventricle  Mild to moderate right ventricular dilation is present. Global right  ventricular function is moderately to severely reduced. A pacemaker lead is  noted in the right ventricle.     Atria  The atria cannot be assessed.     Mitral Valve  s/p 29mm St. Reece MVR(1989). The mean mitral valve gradient is 3.8 mmHg.  Prosthetic valve is well seated, no paravalvular leak and has normal  gradients.        Aortic Valve  s/p 23 mm St. Reece AVR (1998). increased gradient across AV Mean gradient of  23mmHg, peak gradient of 43, Peak velocity of 3.2m/s.     Tricuspid Valve  The tricuspid valve is normal. Mild tricuspid insufficiency is present. Right  ventricular systolic pressure is 25mmHg above the right atrial pressure.     Pulmonic Valve  The pulmonic valve is normal. Trace pulmonic insufficiency is present.     Vessels  Visualized poriton of the aorta are normal in size (proximal ascending aorta  measures 3cm). The pulmonary artery is normal. Unable to assess mean RA  pressure given the patient is on a ventilator. IVC is dilated.     Pericardium  No pericardial effusion is present.

## 2019-01-02 NOTE — PROGRESS NOTES
[facility], [place]  Procedure Note           Intra-Aortic Balloon Pump Discontinuation:       Nicolette Adan  MRN# [id number]   January 2, 2019, 5:23 PM             IABP discontinued: January 2, 2019, 5:05 PM   Removed by: Dr. Duke   Catheter saved: No      Recorded by Julian Charles

## 2019-01-02 NOTE — PROGRESS NOTES
"CLINICAL NUTRITION SERVICES - ASSESSMENT NOTE     Nutrition Prescription    RECOMMENDATIONS FOR MDs/PROVIDERS TO ORDER:  Continue diet as ordered. Liberalize dietary restrictions if PO intake declines.     Malnutrition Status:    Non-severe malnutrition in the context of acute on chronic illness    Recommendations already ordered by Registered Dietitian (RD):  PRN Boost Plus    Future/Additional Recommendations:  Consider 100 mg thiamine daily for EF <30% if pt willing.      REASON FOR ASSESSMENT  Nicolette Adan is a/an 68 year old female assessed by the dietitian for Admission Nutrition Risk Screen for reduced oral intake over the last month    NUTRITION HISTORY  PMH HFrEF (EF 15%).  Pt presented to [facility] for epigastric pain on 12/20, then transferred to [facility] 12/30.  Pt follows a low sodium diet at home. She feels confident with this information, declines need for more education. She states her doctor PTA told her to follow a low potassium diet (she was told don't eat potatoes, tomatoes, bananas) presumably pt thought d/t kidney fx- she notes her K+ was last 4.9 PTA. She is wondering if now she can eat these foods since she has been needing K+ replacement.   She states her appetite at times has been reduced d/t feeling pressure in her abdomen but she has been eating OK recently. She normally eats 2 meals per day and at times has Premier Protein shakes.     CURRENT NUTRITION ORDERS  Diet: 2 g Sodium  Intake/Tolerance: only one meal documented on flowsheet- 50% last night. However she states she has been eating well, 3 meals per day. She thinks she's not getting very much protein but she did have chicken and yogurt yesterday.    LABS  Labs reviewed  K+ 4.1    MEDICATIONS  Medications reviewed    ANTHROPOMETRICS  Height: 152.4 cm (5' 0\")  Most Recent Weight: 48.7 kg (107 lb 5.8 oz)    IBW: 45.5 kg  BMI: Normal BMI  Weight History: Per Care Everywhere, pt was 50 kg on 12/20 --> 49.4 kg on 12/21 --> " "49.9 kg on 12/25. Lowest wt 47.8 kg on 1/1.   Wt Readings from Last 10 Encounters:   01/02/19 48.7 kg (107 lb 5.8 oz)     Dosing Weight: 48 kg    ASSESSED NUTRITION NEEDS  Estimated Energy Needs: 1440 - 1680 kcals/day (30 - 35 kcals/kg )  Justification: Heart failure + lower BMI  Estimated Protein Needs: 53 - 67 grams protein/day (1.1 - 1.4 grams of pro/kg)  Justification: Increased needs with cardiac status  Estimated Fluid Needs:  Per provider pending fluid status and desired fluid restriction    PHYSICAL FINDINGS  See malnutrition section below.     MALNUTRITION  % Intake: Decreased intake does not meet criteria  % Weight Loss: Weight loss does not meet criteria  Subcutaneous Fat Loss: Facial region, Upper arm, Lower arm and Thoracic/intercostal: mild  Muscle Loss: Temporal, Facial & jaw region, Thoracic region (clavicle, acromium bone, deltoid, trapezius, pectoral), Upper arm (bicep, tricep), Lower arm  (forearm) and Dorsal hand: moderate  Fluid Accumulation/Edema: None noted  Malnutrition Diagnosis: Non-severe malnutrition in the context of acute on chronic illness    NUTRITION DIAGNOSIS  Inadequate oral intake related to reduced appetite at times, dietary restrictions as evidenced by eating 2 meals per day PTA with reports of reduced appetite/intake, avoiding high K+ foods, reports of eating lower protein foods    INTERVENTIONS  Implementation  Nutrition Education: Provided education on eating adequately in hospital to maintain wt/LBM. Encouraged protein sources at meals, notified pt of ONS available if needed. Discussed w/ pt she does not currently need to aggressively restrict K+ at this time given her labs and needing K+ replacements. However, she should continue to monitor w/ her doctors in case this changes. Pt requested information on K+ in case she needs to limit again - provided handout \"managing potassium intake.\"   Medical food supplement therapy - PRN Boost     Goals  Patient to consume % of " nutritionally adequate meal trays TID, or the equivalent with supplements/snacks.     Monitoring/Evaluation  Progress toward goals will be monitored and evaluated per protocol.    Charmaine Sagastume RD, HYUN, [facility]  CVICU Dietitian  Pager: [phone]

## 2019-01-02 NOTE — PLAN OF CARE
No Neuro deficits. IABP changed to 1:1  and heparin held at 0430 In preparation for IABP removal this AM. Lasix given x 1 for low UOP with good results. BP stable with MAPs>65. K+ replaced. No BM. See flowsheets and EMAR for details. Continue plan of care. Update team with changes/concerns.

## 2019-01-02 NOTE — CONSULTS
EP Cardiology Consult                                                               2019  Nicolette Adan MRN: [id number]  Age: 68 year old, : 1950        Reason for consult:      Recurrent VT        Assessment and Recommendation:     67 yo with past medical history significant for HFrEF 2/2 nonischemic dilated cardiomyopathy (EF 15%, 18), rheumatic heart disease s/p mechanical MV and AV replacement, atrial fibrillation, and sudden cardiac arrest ventricular fibrillation in  s/p dual chamber ICD with recurrent ICD shocks who presented from [facility] for epigastric discomfort and found to have recurrent VT with ICD shocks. She was transferred for consideration for advanced therapies. She was placed on a balloon pump but doing better now. Coronaries are reportedly non-obstructive per reports from OSH. Unfortunately I do not have EKGs or ICD strips in front of me when she was in VT, and thus do not know whether these episodes were monomorphic or polymorphic. Obviously, it will be helpful to find these recordings of these events. In the interim, continue amiodarone drip. If recurrent VT you can rebolus. I agree with stopping lidocaine given supratherapeutic level. You can also try starting ranolazine if she continues to have more VT. I would say Ms. Adan could potentially be a ablation candidate, however she certainly has challenging substrate with her prior valve surgeries. This would could be considered in the future if medical management is unsuccessful.    1. Recurrent VT with VT storm  - history of NICM, rheumatic heart disease s/p MV and AV replacement  - AICD in place, ICD interrogation as below  - Would like to see EKG and strips if possible of VT episodes.   - Cont Amiodarone, rebolus if more VT  - Agree with holding lidocaine given supratherapeutic level  - Could consider starting ranolazine  - If medical therapy is unsuccessful in preventing further VT,  ablation could be further explored, however certainly could be quite difficult     Patient discussed with staff attending, Dr. Pang and the note reflects our joint plan. Thank you for consulting the cardiovascular services at the [facility]. Please do not hesitate to call with questions or concerns.     Javier Rothman MD    PGY-4, Cardiology Fellow  Pager: [phone]        History of Present Illness:     Ms. Nicolette Adan is a 67 yo with past medical history significant for HFrEF 2/2 nonischemic dilated cardiomyopathy (EF 15%, 12/21/18), rheumatic heart disease s/p mechanical MV and AV replacement, atrial fibrillation, and sudden cardiac arrest ventricular fibrillation in 2008 s/p dual chamber ICD with recurrent ICD shocks who presented from [facility] for epigastric discomfort and found to have recurrent VT with ICD shocks. She was transferred for consideration for advanced therapies. She was placed on a balloon pump but doing better now. She was recently extubated, with plans to have IABP likely out tomorrow.    Ms. Adan states she is feeling well. She denies chest pain, palpitations, SOB. She does not feel LH/dizzy. She states she has had the ICD go off only twice in the past prior to the most recent spell. First shortly after placement in which her ICD settings were changed, and another time in 2010 while traveling to [place], however she does not remember the details all that well.      Past Medical History:     Patient Active Problem List   Diagnosis     VT (ventricular tachycardia) (H)         Past Surgical History:      Past Surgical History:   Procedure Laterality Date     APPENDECTOMY       CARDIAC SURGERY           Social History:     Social History     Socioeconomic History     Marital status:      Spouse name: Not on file     Number of children: Not on file     Years of education: Not on file     Highest education level: Not on file   Social Needs      Financial resource strain: Not on file     Food insecurity - worry: Not on file     Food insecurity - inability: Not on file     Transportation needs - medical: Not on file     Transportation needs - non-medical: Not on file   Occupational History     Not on file   Tobacco Use     Smoking status: Never Smoker     Smokeless tobacco: Never Used   Substance and Sexual Activity     Alcohol use: Yes     Alcohol/week: 0.6 oz     Types: 1 Glasses of wine per week     Comment: occational     Drug use: No     Sexual activity: Not on file   Other Topics Concern     Not on file   Social History Narrative     Not on file         Family History:     No family history on file.      Allergies:     No Known Allergies      Medications:       No current facility-administered medications on file prior to encounter.   Current Outpatient Medications on File Prior to Encounter:  calcium-vitamin D 500-125 MG-UNIT TABS Take 1 tablet by mouth daily   CARVEDILOL PO Take 12.5 mg by mouth 2 times daily (with meals)   DIGOXIN PO Take 125 mcg by mouth   ESCITALOPRAM OXALATE PO Take 5 mg by mouth daily   Furosemide (LASIX PO) Take 2.5 mg by mouth Take with 2lb wt gain, take 2 tabs   LISINOPRIL PO Take 1.25 mg by mouth 2 times daily   Warfarin Sodium (COUMADIN PO)            Physical Exam:     B/P: 99/59, T: 99.3, P: Data Unavailable, R: 22    Wt Readings from Last 4 Encounters:   01/01/19 47.8 kg (105 lb 6.1 oz)         Intake/Output Summary (Last 24 hours) at 1/1/2019 1837  Last data filed at 1/1/2019 1824  Gross per 24 hour   Intake 1228.18 ml   Output 1762 ml   Net -533.82 ml       Gen: AA&Ox3, no acute distress  HEENT:AT/ NC, PERRL b/l, EOM grossly intact, mucous membranes pink, moist without plaque or exudate   PULM/THORAX: Clear to auscultation bilaterally, no rales/rhonchi/wheezes  CV:RRR, S1 and S2 appreciated, no extra heart sounds, murmurs or rub auscultated.  ABD: soft, nontender, nondistended. Normoactive bowel sounds x 4, no HSM  appreciated.  EXT: IABP. No edema, clubbing or cyanosis.   NEURO: CN II-XII intact      Data:     Labs Reviewed on Admission    Troponin Lab Results   Component Value Date    TROPI 0.102 (H) 12/30/2018    TROPI 0.106 (H) 12/30/2018    TROPI 0.040 (H) 09/08/2018    TROPI 0.037 (H) 09/08/2018     BMP  Recent Labs   Lab 01/01/19  1748 01/01/19  1119 01/01/19  0735 01/01/19  0326  12/31/18  1536  12/31/18  0700     --   --  134  --  134  --  131*   POTASSIUM 4.8 3.5 4.2 4.2   < > 4.7   < > 4.1  Canceled, Test credited   CHLORIDE 102  --   --  101  --  102  --  97   KARINA 8.5  --   --  8.1*  --  7.5*  --  8.2*   CO2 26  --   --  27  --  27  --  24   BUN 28  --   --  28  --  28  --  27   CR 1.36*  --   --  1.42*  --  1.42*  --  1.58*   GLC 99  --   --  75  --  118*  --  102*    < > = values in this interval not displayed.     CBC  Recent Labs   Lab 12/30/18  1150 12/30/18  0447 12/30/18  0110   WBC 9.2 8.6 4.9   RBC 3.14* 3.20* 3.23*   HGB 9.3* 9.2* 9.3*   HCT 27.7* 28.4* 29.1*   MCV 88 89 90   MCH 29.6 28.8 28.8   MCHC 33.6 32.4 32.0   RDW 15.2* 14.8 14.7    170 153     INR  Recent Labs   Lab 01/01/19  0326 12/31/18  0346 12/30/18  1150 12/30/18  0110   INR 1.85* 2.66* 2.44* 2.49*      Hepatic Panel   Lab Results   Component Value Date    AST 45 12/30/2018     Lab Results   Component Value Date    ALT 84 12/30/2018     No results found for: BILICONJ   Lab Results   Component Value Date    BILITOTAL 1.2 12/30/2018     Lab Results   Component Value Date    ALBUMIN 3.6 12/30/2018     Lab Results   Component Value Date    PROTTOTAL 7.0 12/30/2018      Lab Results   Component Value Date    ALKPHOS 53 12/30/2018           Most Recent Imaging:     ECG:      Echo: 12/30/18  Interpretation Summary  Severely (EF 10-20%) reduced left ventricular function is present.Severe left  ventricular dilation is present.  Global right ventricular function is moderately to severely reduced.Mild to  moderate right ventricular  dilation is present.  s/p 29mm St. Reece MVR(1989)  s/p 23 mm St. Reece AVR (1998). There is an increased gradient across AV Mean  gradient of 23mmHg, peak gradient of 43, Peak velocity of 3.2m/s    ICD interrogation: 12/31/18  Patient seen on 4E for evaluation and iterative programming of her [employer] dual lead ICD per MD orders.  Normal ICD function. Since last counter reset on 12/28/18, there have been 82 ventricular episodes, 44 VT-1, and 38 NSVT episodes recorded.  The most recent VT-1 episode occurred on 12/30/18 at 1526 with an average VT cycle length of 630 ms.  This received ATP x 7.   The most recent NSVT occurred on 12/30 @ 1555.  The most recent shock therapy occurred on 12/18 for FVT which received unsuccessful ATP x 2 and converted with a single 41J shock. No atrial arrhythmias recorded since 12/28.  Presenting rhythm:  AV paced at 70 bpm. Intrinsic rhythm and pacing thresholds = Not assessed today due to recent ventricular arrhythmias.   AP = 100%.  = 86%.  Estimated battery longevity to SILVANO = 8 years.   Lead impedance trends appear stable.  No programming changes were made.  Dual lead ICD

## 2019-01-02 NOTE — PLAN OF CARE
S:  IABP at 1:3 most of shift. Amio gtt discontinued and oral amio dosing ordered to begin this evening.  Pt had BM x2. 775cc UO in 3 hours after IV lasix dose given.  Pt had period of low MAP's (low-high 60's on IAP, low 50's on connor), Cards 2 team notified. Pressures slowly increased as UO slowed.  Pt denies pain, SOB, nausea.  Fair appetite.  100% paced with a few short runs of VT in 80's this am and episodes of PVC's this afternoon.  K replaced. Heparin gtt continues at 700 units/hour.  B:  Pt s/p VT storm requiring sedation, intubation and IABP.  A:  Pt hemodynamically stable with IABP 1:3.  Despite drop in MAPs earlier in shift. MAP's now mid 60's on connor, low 70's on IABP. Less ectopy this afternoon after K replaced.    P:  Continue to monitor closely overnight.  Possible removal of IABP if pt remains stable overnight with IABP at 1:3.

## 2019-01-02 NOTE — PROVIDER NOTIFICATION
Pt having long runs of Vtach ranging from 3 to over 40 beats, BP stable. K 4.2, Mag 2.3. Cards 2 notified, EKG ordered.

## 2019-01-03 NOTE — PROGRESS NOTES
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  She was initially stabilized with amiodarone, lidocaine, propranolol, and general sedation for suppression of VT as well as vasopressors and an IABP for hemodynamic support.  Sedation has since been lifted and the IABP was removed this afternoon.  Lidocaine was discontinued because of supratherapeutic levels.  Coronary angiography showed no obstructive CAD.    Strips of the initial rhythms were not available but EKGs and telemetry following the above therapies have shown short runs of a slow monomorphic VT at 98 bpm, lasting up to just under a minute- RBBB pattern with a superior axis, suggesting an apical origin:      The ICD was placed for secondary prevention in 2008 after a VF cardiac arrest.  She says she had an inappropriate shock that same hospitalization because the VT zone was set too low and an appropriate shock in 2010 but none since.  Her medical history is notable for atrial fibrillation, mechanical mitral and aortic valve replacement for rheumatic heart disease, CKD, and diabetes.    Currently she feels well.  She is breathing comfortably.  She has no palpitations or chest pain or lightheadedness.  She has been on bedrest because of the IABP.      BP 99/59   Temp 99.7  F (37.6  C) (Oral)   Resp 21   Ht 1.524 m (5')   Wt 48.7 kg (107 lb 5.8 oz)   SpO2 96%   BMI 20.97 kg/m     General appearance:  Comfortable appearing female lying in bed with unlabored breathing.  Neuro:  Alert & fully oriented.  Fluent speech.  Psych:  Cheerful mood.  Smiling.  Friendly.  Eyes:  Anicteric sclerae.  CV:  RRR, RV paced on telemetry, mechanical s1 and s2, no rub, cool extremities.  Lungs:  Clear on  ascultation.  Abdomen:  Soft.  Not tender.  Skin:  No peripheral ecchymoses/petechiae.    Relevant labs, imaging, and procedures were reviewed.  CMP  Recent Labs   Lab 01/02/19  1545 01/02/19  0726 01/02/19  0345 01/01/19  1748  01/01/19  0735 01/01/19  0326  12/31/18  0700  12/30/18  2023  12/30/18  1150  12/30/18  0110     --  134 136  --   --  134   < > 131*  --   --    < > 130*   < > 129*   POTASSIUM 3.9 4.1 3.8 4.8   < > 4.2 4.2   < > 4.1   < > 4.3   < > 4.1   < > 3.6   CHLORIDE 100  --  100 102  --   --  101   < > 97  --   --    < > 92*   < > 90*   CO2 28  --  29 26  --   --  27   < > 24  --   --    < > 26   < > 31   ANIONGAP 6  --  6 8  --   --  7   < > 10  --   --    < > 11   < > 8   *  --  97 99  --   --  75   < > 102*  --   --    < > 94   < > 103*   BUN 27  --  26 28  --   --  28   < > 27  --   --    < > 34*   < > 33*   CR 1.32*  --  1.32* 1.36*  --   --  1.42*   < > 1.58*  --   --    < > 2.16*   < > 2.19*   GFRESTIMATED 41*  --  41* 40*  --   --  38*   < > 33*  --   --    < > 23*   < > 22*   GFRESTBLACK 48*  --  48* 46*  --   --  44*   < > 39*  --   --    < > 26*   < > 26*   KARINA 8.5  --  8.4* 8.5  --   --  8.1*   < > 8.2*  --   --    < > 8.4*   < > 8.4*   MAG  --  2.3  --   --   --  2.6* 2.9*  --  2.6*  --  2.4*   < >  --    < > 2.5*   PHOS  --  2.7  --   --   --   --  4.1  --  4.2  --  4.2  --   --    < > 4.4   PROTTOTAL  --   --   --   --   --   --   --   --   --   --   --   --  7.0  --  7.2   ALBUMIN  --   --   --   --   --   --   --   --   --   --   --   --  3.6  --  3.7   BILITOTAL  --   --   --   --   --   --   --   --   --   --   --   --  1.2  --  1.0   ALKPHOS  --   --   --   --   --   --   --   --   --   --   --   --  53  --  57   AST  --   --   --   --   --   --   --   --   --   --   --   --  45  --  56*   ALT  --   --   --   --   --   --   --   --   --   --   --   --  84*  --  92*    < > = values in this interval not displayed.     CBC  Recent Labs   Lab 01/02/19  7409  12/30/18  1150 12/30/18  0447 12/30/18  0110   WBC 9.0 9.2 8.6 4.9   RBC 3.12* 3.14* 3.20* 3.23*   HGB 9.0* 9.3* 9.2* 9.3*   HCT 29.5* 27.7* 28.4* 29.1*   MCV 95 88 89 90   MCH 28.8 29.6 28.8 28.8   MCHC 30.5* 33.6 32.4 32.0   RDW 15.4* 15.2* 14.8 14.7    183 170 153     INR  Recent Labs   Lab 01/02/19  0345 01/01/19  0326 12/31/18  0346 12/30/18  1150   INR 1.46* 1.85* 2.66* 2.44*     Arterial Blood Gas  Recent Labs   Lab 01/02/19  1549 01/02/19  1304 01/02/19  0816 01/02/19  0345  12/30/18  1633  12/30/18  1150 12/30/18  0819 12/30/18  0546   PH  --   --   --   --   --  7.50*  --  7.55* 7.53* 7.61*   PCO2  --   --   --   --   --  36  --  32* 36 30*   PO2  --   --   --   --   --  177*  --  179* 180* 184*   HCO3  --   --   --   --   --  28  --  29* 30* 30*   O2PER 21 21 21.0 21   < > 40%  40%   < > 40 40 40    < > = values in this interval not displayed.     Echocardiogram 12/30/18  Severely (EF 10-20%) reduced left ventricular function is present.  Severe left ventricular dilation is present.  Global right ventricular function is moderately to severely reduced.  Mild to moderate right ventricular dilation is present.  s/p 29mm St. Reece MVR(1989).  The mean mitral valve gradient is 3.8 mmHg.  Prosthetic valve is well seated, no paravalvular leak and has normal gradients.  s/p 23 mm St. Reece AVR (1998).  There is an increased gradient across AV Mean gradient of 23mmHg, peak gradient of 43, Peak velocity of 3.2m/s        Impression:  Recurrent monomorphic VT with ICD shocks  Acute on chronic systolic heart failure with circulatory shock  Mechanical AV and MV replacements due to rheumatic heart disease  Paroxysmal AF, anticoagulated with warfarin prior to admission.    Recommendations:  Continue medical therapy for VT with amiodarone and re-institution of beta blockade (when tolerated).  Currently she is not having long runs of sustained VT.  Adding ranolazine would be the next step in escalation of medical  therapy, after the above measures.  Her mechanical aortic and mitral valve preclude trans-septal and retrograde aortic access for VT ablation.  If it cannot be accessed from the RV she would require epicardial mapping and ablation.    It was our pleasure to assist in Ms Adan's care.  Please page [phone] with any questions.    I discussed the patient with Dr. Richard Nolan.    Neil Clayton MD  Cardiovascular disease fellow    ELECTROPHYSIOLOGY STAFF  Patient seen and examined by me.  History and physical examination discussed with Dr. Clayton whose note reflects our joint assessment and recommendation/plans.  Asked to see this very pleasant 68 year old woman who had a recent syncopal event associated with an appropriate ICD shock.  She has no recollection of the event.  She has NICM.  She had a secondary prevention ICD placed in 2009; she recalls two other shocks since implant, one within the first year of implant.  Over the past 10 years her shock burden has been low; hopefully it may remain so.  She has prosthetic aortic and mitral valves that markedly reduce the chance of a successful VT ablation.  We agree with current amiodarone therapy and recommend re-initiating beta-blocker therapy when possible.  Richard Nolan

## 2019-01-03 NOTE — PHARMACY-ANTICOAGULATION SERVICE
Clinical Pharmacy - Warfarin Dosing Consult     Pharmacy has been consulted to manage this patient s warfarin therapy.  Indication: Mechanical Mitral Valve Replacement  Therapy Goal: INR 2.5-3.5  Provider/Team: Cards II  Warfarin Prior to Admission: Yes  Warfarin PTA Regimen: 1 mg Sat; 3 mg Sun-Fri  Significant drug interactions: Aspirin & heparin (increased risk of bleeding), amiodarone (elevated INR)  Recent documented change in oral intake/nutrition: Unknown    INR   Date Value Ref Range Status   01/03/2019 1.40 (H) 0.86 - 1.14 Final   01/02/2019 1.46 (H) 0.86 - 1.14 Final       Recommend warfarin 3 mg today.  Pharmacy will monitor Nicolette Adan daily and order warfarin doses to achieve specified goal.      Please contact pharmacy as soon as possible if the warfarin needs to be held for a procedure or if the warfarin goals change.      Sarahy Harris PharmD  CVICU and Advanced Heart Failure Pharmacist  Pager [phone]

## 2019-01-03 NOTE — PLAN OF CARE
D/I/A:  A&Ox4. 2 episodes of sustained vtach, BPs remained stable. Frequent episodes of PVCs and V runs of 6 beats or more. Lytes checked and replaced per protocol. EKGs done. Balloon pump pulled at 1715, no hematoma, CMS in intact. PAs 50s/20-30, CVP 11-14,  Bedrest until 2315. Urine output decreased to 15mL/hr, 20mg Lasix given w/ good response.   P: Continue to monitor hemodynamic status, notify MDs of any changes.

## 2019-01-03 NOTE — ANESTHESIA PROCEDURE NOTES
Peripheral Nerve Block Procedure Note    Staff:     Anesthesiologist:  Enrrique Mendez MD    Referred By:  Malina Greenwood MD  Procedure Start/Stop TImes:     patient identified, IV checked, site marked, risks and benefits discussed, informed consent, monitors and equipment checked, pre-op evaluation, at physician/surgeon's request and post-op pain management      Correct Patient: Yes      Correct Position: Yes      Correct Site: Yes      Correct Procedure: Yes      Correct Laterality:  Yes    Site Marked:  Yes  Procedure details:     Procedure:  Paravertebral    ASA:  4    Laterality:  Bilateral    Position:  Prone    Sterile Prep: chloraprep, mask and sterile gloves      Local skin infiltration:  2% lidocaine    amount (mL):  2    Needle:  Touhy needle    Needle gauge:  17    Needle length (inches):  3.13    Ultrasound: Yes      Ultrasound used to identify targeted nerve, plexus, or vascular structure and placed a needle adjacent to it      Permanent Image entered into patiient's record      Abnormal pain on injection: No      Blood Aspirated: No      Paresthesias:  No    Bleeding at site: No      Bolus via:  Needle    Infusion Method:  Single Shot    Complications:  None

## 2019-01-03 NOTE — PHARMACY-ADMISSION MEDICATION HISTORY
Admission medication history interview status for the 12/30/2018 admission is complete. See Epic admission navigator for allergy information, pharmacy, prior to admission medications and immunization status.     Medication history interview sources:  Fill histories and the patient    Changes made to PTA medication list (reason)  Added: Amiodarone (new from [facility] hospitalization), supplements  Deleted: none  Changed: furosemide from 2.5 mg to 20 mg and digoxin from daily to every other day  Omitted: alendronate 35 mg take 1 tablet weekly (never started as outpatient due to sodium content and her concern for exacerbation of her heart failure)    Additional medication history information (including reliability of information, actions taken by pharmacist): The patient was a very reliable historian; however there may have been some medication changes at [facility] including:  -pantoprazole 40 mg by mouth daily  -escitalopram changed to sertraline 50 mg daily likely due to a prolonged QTc      Prior to Admission medications    Medication Sig Last Dose Taking? Auth Provider   amiodarone (PACERONE/CODARONE) 200 MG tablet Take 200 mg by mouth daily Past Week at Unknown time Yes Unknown, Entered By History   calcium-vitamin D 500-125 MG-UNIT TABS Take 1 tablet by mouth daily Past Month at Unknown time Yes Reported, Patient   CARVEDILOL PO Take 12.5 mg by mouth 2 times daily (with meals) Past Month at Unknown time Yes Reported, Patient   Cyanocobalamin (B-12 PO) Take 1,000 mcg by mouth once a week Past Month at Unknown time Yes Unknown, Entered By History   DIGOXIN PO Take 125 mcg by mouth every 48 hours  Past Month at Unknown time Yes Reported, Patient   ESCITALOPRAM OXALATE PO Take 5 mg by mouth daily Past Month at Unknown time Yes Reported, Patient   FERROUS SULFATE PO Take 325 mg by mouth daily Past Month at Unknown time Yes Unknown, Entered By History   Furosemide (LASIX PO) Take 20 mg by mouth daily Take total of 40  mg with 2lb wt gain Past Month at Unknown time Yes Reported, Patient   LISINOPRIL PO Take 1.25 mg by mouth 2 times daily Past Month at Unknown time Yes Reported, Patient   Warfarin Sodium (COUMADIN PO) Using 1 mg tablets, take 1 mg by mouth daily on Saturdays, take 3 mg by mouth daily Sunday thru Friday Past Month at Unknown time Yes Reported, Patient         Medication history completed by:   Rosemary DavisonD  CVICU and Advanced Heart Failure Pharmacist  Pager [phone]

## 2019-01-03 NOTE — PROGRESS NOTES
[facility] Cardiology Progress Note           Assessment and Plan:   Nicolette Adan is a 68 year old female with history of HFrEF 2/2 nonischemic dilated cardiomyopathy (EF 15%, 12/21/18), rheumatic heart disease s/p mechanical MV and AV replacement, atrial fibrillation, and sudden cardiac arrest ventricular fibrillation in 2008 s/p dual chamber ICD with recurrent ICD shocks,  She presented to [facility] for epigastric discomfort and was found to have recurrent VT with ICD shocks. Transferred to [facility] with refractory VT and for consideration for advanced therapies.    IABP was placed due to hypotension despite vasopressin.  She was extubated 12/31.  Morning of 1/2, patient had 60 second run of VT.  EKG not able to capture run, but ectopy appeared to be RBBB in nature, inferior axis, potentially LV apical or LVOT focus.  Due to recurrent NSVT and VT and hemodynamics suggesting cold and dry physiology, we will start evaluation for potential LVAD or transplant (O blood type)     # VT storm  # Hx of cardiac arrest 2/2 VF (2008), s/p dual chamber ICD with recurrent shocks  # Prolonged QT  Multiple recent shocks from ICD including few days prior had syncopal episode and was shocked. Prolonged QT seen at outside hospital - PTA meds stopped at OSH: prozac, lexapro, digoxin. Poor candidate for VT ablation due to mechanical aortic and mitral valves. Angiogram done on 12/24/18 with normal coronary arteries. Transferred on amiodarone drip, lidocaine gtt and propranolol TID.  Lidocaine stopped due to elevated level.  IABP placed on admission here 12/30, removed 1/2.   - attempted to pace at higher atrial rate, however MAP did not improve and her VT is quite slow    - amiodarone 400mg daily, she has had adequate load based on amount given in [place]   - beta blocker held here due to low output   - started ranolazine 500mg BID   - paravertebral block as temporizing measure, performed by anesthesiology 1/3   -  swan daphne catheter for hemodynamics    - Vasopressin as needed   - Monitor lytes, on replacement protocol     # HFrEF exacerbation, EF 15% 12/21/18  # Non ischemic dilated cardiomyopathy   - Continue ASA    # Rheumatic heart disease   # Mitral valve replacement, 29mm St. Reece 1989  # Aortic valve replacement, 23 mm St. Reece 1998  # Atrial fibrillation, CHADSVASc ~ 4, on warfarin goal 2.5-3.5  Paced on tele.    - Heparin gtt   - Monitor INR     # CKD III - NANCY resolved, Cr 1.1, eGFR 50, component of low output and cardiorenal, mixed with hypervolemia     Chronic issues:   - Depression: Continue sertraline   - DM: Monitor sugars, hypoglycemia protocol   - Non-severe malnutrition in the context of acute on chronic illness - seeing nutrition here   - Anemia Secondary to CKD and chronic disease. No sign of active bleed     CODE: FULL  Diet/IVF: speech study  DVT ppx: Heparin gtt  Disposition/Admission Status: Inpatient     Patient was discussed with Dr. Greenwood.  Family updated in person today.    Jean Marie Duke MD PGY5  Cardiology Fellow       Subjective:     Feels well.  No chest discomfort or dyspnea.  Leg feels well.  Glad to be sitting up in chair.          Medications:       amiodarone  400 mg Oral BID     aspirin  81 mg Oral Daily     docusate sodium  100 mg Oral BID     ranolazine  500 mg Oral BID     sertraline  50 mg Oral Daily     sodium chloride (PF)  3 mL Intracatheter Q8H       HEParin 700 Units/hr (01/03/19 0700)     - MEDICATION INSTRUCTIONS -       - MEDICATION INSTRUCTIONS -       vasopressin (PITRESSIN) infusion ADULT (40 mL) Stopped (01/01/19 0200)            Objective:          Physical Exam:   Temp:  [97.8  F (36.6  C)-99.7  F (37.6  C)] 97.8  F (36.6  C)  Heart Rate:  [69-98] 70  Resp:  [10-33] 15  MAP:  [64 mmHg-93 mmHg] 73 mmHg  Arterial Line BP: ()/(38-71) 85/58  SpO2:  [92 %-97 %] 96 %  I/O last 3 completed shifts:  In: 1691.48 [P.O.:720; I.V.:971.48]  Out: 1235 [Urine:1235]    Vitals:     12/30/18 0000 12/31/18 0000 01/01/19 0400 01/02/19 0400   Weight: 49 kg (108 lb 0.4 oz) 48.1 kg (106 lb 0.7 oz) 47.8 kg (105 lb 6.1 oz) 48.7 kg (107 lb 5.8 oz)    01/03/19 0400   Weight: 49.2 kg (108 lb 7.5 oz)     CVP 9  PA 46/28  MVO2 45  CO/CI 2.8/2  SVR 1800    GEN: alert and oriented  HEENT:  swan  CV:  Regular rate and rhythm, holosystolic murmur, click  LUNGS:  on nasal canula, inspiratory rales at bases  GROIN: no hematoma  ABD:  Active bowel sounds, soft, non-tender/non-distended.  No rebound/guarding/rigidity.  EXT:  No edema or cyanosis.  Hands/feet warm to touch with good signs of peripheral perfusion.          Data:   BMP  Recent Labs   Lab 01/03/19  0319 01/02/19 2023 01/02/19  1545 01/02/19  0726 01/02/19  0345 01/01/19  1748     --  135  --  134 136   POTASSIUM 4.0 5.1 3.9 4.1 3.8 4.8   CHLORIDE 96  --  100  --  100 102   KARINA 8.3*  --  8.5  --  8.4* 8.5   CO2 30  --  28  --  29 26   BUN 28  --  27  --  26 28   CR 1.26*  --  1.32*  --  1.32* 1.36*   *  --  117*  --  97 99     LFTs  Recent Labs   Lab 12/30/18  1150 12/30/18  0110   ALKPHOS 53 57   AST 45 56*   ALT 84* 92*   BILITOTAL 1.2 1.0   PROTTOTAL 7.0 7.2   ALBUMIN 3.6 3.7      CBC  Recent Labs   Lab 01/02/19  0345 12/30/18  1150 12/30/18  0447 12/30/18  0110   WBC 9.0 9.2 8.6 4.9   RBC 3.12* 3.14* 3.20* 3.23*   HGB 9.0* 9.3* 9.2* 9.3*   HCT 29.5* 27.7* 28.4* 29.1*   MCV 95 88 89 90   MCH 28.8 29.6 28.8 28.8   MCHC 30.5* 33.6 32.4 32.0   RDW 15.4* 15.2* 14.8 14.7    183 170 153     INR  Recent Labs   Lab 01/03/19  0319 01/02/19  0345 01/01/19  0326 12/31/18  0346   INR 1.40* 1.46* 1.85* 2.66*     Echo 12/30  Left Ventricle  Left ventricular wall thickness is normal. Severe left ventricular dilation is  present. Severely (EF 10-20%) reduced left ventricular function is present.  Diastolic function not assessed due to presence of prosthetic mitral valve.  Severe diffuse hypokinesis is present.     Right Ventricle  Mild to  moderate right ventricular dilation is present. Global right  ventricular function is moderately to severely reduced. A pacemaker lead is  noted in the right ventricle.     Atria  The atria cannot be assessed.     Mitral Valve  s/p 29mm St. Reeec MVR(1989). The mean mitral valve gradient is 3.8 mmHg.  Prosthetic valve is well seated, no paravalvular leak and has normal  gradients.        Aortic Valve  s/p 23 mm St. Reece AVR (1998). increased gradient across AV Mean gradient of  23mmHg, peak gradient of 43, Peak velocity of 3.2m/s.     Tricuspid Valve  The tricuspid valve is normal. Mild tricuspid insufficiency is present. Right  ventricular systolic pressure is 25mmHg above the right atrial pressure.     Pulmonic Valve  The pulmonic valve is normal. Trace pulmonic insufficiency is present.     Vessels  Visualized poriton of the aorta are normal in size (proximal ascending aorta  measures 3cm). The pulmonary artery is normal. Unable to assess mean RA  pressure given the patient is on a ventilator. IVC is dilated.     Pericardium  No pericardial effusion is present.

## 2019-01-03 NOTE — PLAN OF CARE
No Neuro deficits. R groin site soft no hematoma with dopplerable pulses. Pt with AV paced rhythm. Pt went in and out sustained slow (HR low 100's) VTACH throughout night but remained hemodynamically stable and asymptomatic. Electrolytes WNL. Team aware. At approx 0230 pt had sustained run of VTACH with MAPS dropping to the 50's. AMIO bolus given, gtt started and 250mL LR bolus given. Pt converted and has had minimal PVC's since. CVP:9; PA 46/18; SVO2 45; CI 2; SVR 1895. Lasix given x 1 for low UOP with fair results.  K+ replaced. BM x1 See flowsheets and EMAR for details. Continue plan of care. Update team with changes/concerns.

## 2019-01-04 NOTE — CONSULTS
Patient of Dr. Malina Greenwood seen inpatient for psychosocial assessment.   60 minutes spent with patient. 100% of visit consisted of counseling related to issues surrounding lvad and heart transplant.    Psychosocial Assessment   Name: Nicolette Adan     MRN:  [id number]        Patient Name / Age / Race: Nicolette Adan 68 year old    Source of Information: Patient and Family   : Catalina Walton   Interview Date: January 4, 2019   Reason for Referral: Heart Transplant and Mechanical Circulatory Support     Current Living Situation   Location (City/State): [address] [place] [address]  [facility] [address]   With Whom: Living arrangements - the patient lives with their spouse.   Type of Home: Single family home with 2 stairs to enter. Inside there are 12 stairs to the bedroom. There is a bathroom on both levels. Most days Nicolette is able to manage the stairs   Working Phone? Yes -cell and landline   [employer]? Yes    Distance from Hospital: 2 hours   Need to Relocate Post Surgery? Yes    Discussed? Yes -Jon has been staying at their daughter's home in [place].      Vocational/Employment/Financial   Employment: Retired   Occupation: Nicolette worked as a  at her local high school. She retired in 2012   Education: Some college   [place]? No   Income: [facility], pension and spouse's income   Insurance: Medicare and Private Insurance   Name of Private Insurance: Blue Cross/ Blue Shield supplement   Medication Coverage: [employer] Part D    In current situation, pt. can afford medication and supply costs:  Yes    Other Financial Concerns/Issues: None     Family/Social Support   Marital Status:    Partner Name: Jon Adan   Length of Time : 48 years   Partner s Employment: Jon is retired    Relationship: stable/supportive   Children: Together they have 2 adult children. Malgorzata lives in [place] & Nelson lives in [place].   Parents: Mom lives in  [place] & is doing well. She and her sister live together.    Siblings: Brother who is supportive. [place] to his kids.    Other Family or Friends Close by: Neighbors and friends.    Support System: available, helpful   Primary Support Person: Jon   Issues: None at this time.      Activities/Functional Ability   Current Level: ambulatory and independent with ADL's   Daily Activities: Nicolette reports that she continues to do the cooking, laundry, and light cleaning. The mostly do shopping together.    Transportation: self      Medical Status   Patient s understanding of need for surgical intervention: Nicolette reports understanding. She is hopeful that she'll be able to have some improvement without advance treatments.    Advanced Directive? No    Details: Provided copy of long form HCD .   Adherence History: Nicolette reports that she is independent with medication management. She reports no issues with medications, appointments, or diet.    Ability to Adhere to Complex Medical Regime: Yes     Behavioral   Chemical Dependency Issues: No   Nicolette reports that on average she has 1-2 glasses of wine on Friday nights. She reports no illicit drug use, no CD treatment, and no DUIs.    Smoker? No   Psychiatric impairment: No   Nicolette reports feeling a little anxiety with her health changes.    Coping Style/Strategies: Nicolette reports that spending time with her cat, the kids coming to visit, listening to music, and reading to be helpful.    Recent Legal History: No   Teaching Completed During Assessment Related To Transplant and Mechanical Circulatory Support: 1. Housing and relocation needs post surgery.  2. Caregiver needs post surgery.  3. Financial issues related to surgery.  4. Risks of alcohol use post surgery.  5. Common psychosocial stressors pre/post surgery.  6. Support group availability.   Psychosocial Risks Reviewed Related To Transplant and Mechanical Circulatory  Support: 1. Increased stress related to your emotional, family, social, employment, or financial situation.  2. Affect on work and/or disability benefits.  3. Affect on future health and life insurance.  4. Outcome expectations may not be met.  5. Mental Health risks: anxiety, depression, PTSD, guilt, grief and chronic fatigue.     Observed Behavior   Well informed? Yes  Angry? No   Process info well? Yes  Hostile? No   Evasive? No Oriented X3? Yes    Cautious/Suspicious? No Motivated? Yes    Appropriate Behavior? Yes  Depressed? No   Appropriate Affect? Yes  Anxious? No   Interview Observations: Nicolette reports some mild anxiety, but she appears to be having normal coping with health changes.      Selection Criteria Met   Plan for Support Yes    Chemical Dependence Yes    Smoking Yes    Mental Health Yes    Adequate Finances Yes      Risk Issues:    None at this time.     Final Evaluation/Assessment:     Met with Nicolette and Jon inpatient to complete heart transplant & lvad psychosocial assessment. Jon appears to be good support and will be Nicolette's primary support. They report adequate insurance and deny financial concerns. Nicolette reports minimal alcohol use. She reports no issues with chemical dependency, mental health, legal issues, or tobacco use. Overall Nicolette appears to be a good candidate for heart transplant or lvad from psychosocial perspective.     Patient understands risks and benefits of Heart Transplant and Mechanical Circulatory Support: Yes     Recommendation:    good    Signature: Catalina Walton     Title: Licensed Independent Clinical                Interview Date: January 4, 2019

## 2019-01-04 NOTE — PROGRESS NOTES
"SIX MINUTE WALK TEST  Cardiac Rehab  2019    Nicolette Adan MRN# [id number]   YOB: 1950 Age: 68 year old     Height: 5' 0\"  Weight (lbs): 102 lbs 4.7 oz Weight (kg): 46.4 kg (dosing weight)    Supplemental oxygen during the test: No    Oxygen Appliance: RA    Oximetry: Finger Probe    Gait Aid: IV pole     Pre-test Post-test   Time 1:40 1:50   Blood Pressure (mm Hg) 95/58 95/65   Heart rate (bpm) 70 71   Rated Perceived Dyspnea (Benedict Scale) 1 -- Very mild shortness of breath  2 -- Mild shortness of breath    Rated Perceived Exertion (Benedict Scale) 1 -- Very weak  1 -- Very weak    SpO2 (%) 95 96     Total distance walked in 6 minutes: 500 feet, 166.6 meters    Paused during test?No  Number of pauses: 0  Total Time stopped: 0    Stopped test before 6 minutes? No  Time completed: 6  Distance: 525ft  Reason: na    Did the patient experience any pain or discomfort during the test? No  Pain Ratin/10  Pain Description: na  Comments: Pt completed well conversing during ambulation without difficulties other than balance deficits requiring IV pole for support.     Oxygen Titration Required: No  Resting oxygen requirement: RA Liters on RA  Exercise oxygen requirement: RA Liters on RA    Performing Staff: Alex Villela OT        "

## 2019-01-04 NOTE — PLAN OF CARE
No Neuro deficits. Pt with AV paced rhythm. Pt had lown SVO2 of 37% and CI of 1.7 at midnight. 40mg Lasix given with fair results. CVP:10; PA 55/25; SVO2 48; CI 2.1; SVR 2282. Lasix given x 1 for low UOP with fair results.  K+ replaced. No BM. See flowsheets and EMAR for details. Continue plan of care. Update team with changes/concerns.

## 2019-01-04 NOTE — PROGRESS NOTES
[facility] Cardiology Progress Note           Assessment and Plan:   Nicolette Adan is a 68 year old female with history of HFrEF 2/2 nonischemic dilated cardiomyopathy (EF 15%, 12/21/18), rheumatic heart disease s/p mechanical MV and AV replacement, atrial fibrillation, and sudden cardiac arrest ventricular fibrillation in 2008 s/p dual chamber ICD with recurrent ICD shocks,  She presented to [facility] for epigastric discomfort and was found to have recurrent VT with ICD shocks. Transferred to [facility] with refractory VT and for consideration for advanced therapies.    IABP was placed due to hypotension despite vasopressin.  She was extubated 12/31.  Morning of 1/2, patient had 60 second run of VT.  EKG not able to capture run, but ectopy appeared to be RBBB in nature, inferior axis, potentially LV apical or LVOT focus.  Due to recurrent NSVT and VT and hemodynamics suggesting cold and dry physiology, we will start evaluation for potential LVAD or transplant (O blood type)     # VT storm  # Hx of cardiac arrest 2/2 VF (2008), s/p dual chamber ICD with recurrent shocks  # Prolonged QT  Multiple recent shocks from ICD including few days prior had syncopal episode and was shocked. Prolonged QT seen at outside hospital - PTA meds stopped at OSH: prozac, lexapro, digoxin. Poor candidate for VT ablation due to mechanical aortic and mitral valves. Angiogram done on 12/24/18 with normal coronary arteries. Transferred on amiodarone drip, lidocaine gtt and propranolol TID.  Lidocaine stopped due to elevated level.  IABP placed on admission here 12/30, removed 1/2.   - attempted to pace at higher atrial rate, however MAP did not improve and her VT is quite slow    - amiodarone 400mg daily, she has had adequate load based on amount given in [place]   - beta blocker held here due to low output   - ranolazine 500mg BID   - paravertebral block as temporizing measure, performed by anesthesiology 1/3   - trial low  dose nipride for afterload reduction 0.25   - transplant/LVAD evaluation   - swan daphne catheter for hemodynamics    - Monitor lytes, on replacement protocol     # HFrEF exacerbation, EF 15% 12/21/18  # Non ischemic dilated cardiomyopathy   - Continue ASA    # Rheumatic heart disease   # Mitral valve replacement, 29mm St. Reece 1989  # Aortic valve replacement, 23 mm St. Reece 1998  # Atrial fibrillation, CHADSVASc ~ 4, on warfarin goal 2.5-3.5  Paced on tele.    - Heparin gtt, giving warfarin for now, though may need to hold if potential procedures arise.   - Monitor INR     # CKD III - NANCY resolved, Cr 1.1, eGFR 50, component of low output and cardiorenal, mixed with hypervolemia, now euvolemic     Chronic issues:   - Depression: Continue sertraline   - DM: Monitor sugars, hypoglycemia protocol   - Non-severe malnutrition in the context of acute on chronic illness - seeing nutrition here   - Anemia Secondary to CKD and chronic disease. No sign of active bleed     Floor Care  Fluids: none  Food: cardiac diet  Electrolyte repletion: prn  Analgesia: none  Sedation: none  DVT ppx: heparin  Stress Ulcer ppx: none  Glycemic Control: none  Catheters/tubes: swan, remove loaiza  Lines: pIV, remove connor, consider PICC  Therapies: PT/OT  Consults: LVAD/transplant workup  Code Status: Full  Discharge plan: pending workup  Family: updated today in person     Patient was discussed with Dr. Lopez.      Jean Marie Duke MD PGY5  Cardiology Fellow       Subjective:     Feels well.  No chest discomfort or dyspnea.  Discussed evaluation process.          Medications:       [START ON 1/5/2019] amiodarone  400 mg Oral Daily     aspirin  81 mg Oral Daily     docusate sodium  100 mg Oral BID     furosemide  20 mg Oral Daily     ranolazine  500 mg Oral BID     sertraline  50 mg Oral Daily     sodium chloride (PF)  3 mL Intracatheter Q8H       HEParin 700 Units/hr (01/04/19 1200)     - MEDICATION INSTRUCTIONS -       nitroPRUsside  (NIPRIDE) IV infusion ADULT/PEDS GREATER than or EQUAL to 45 kg std conc 0.25 mcg/kg/min (01/04/19 1200)     - MEDICATION INSTRUCTIONS -              Objective:          Physical Exam:   Temp:  [97.6  F (36.4  C)-98.3  F (36.8  C)] 98  F (36.7  C)  Heart Rate:  [69-71] 70  Resp:  [9-33] 19  BP: ()/(49-80) 111/71  MAP:  [71 mmHg-95 mmHg] 74 mmHg  Arterial Line BP: ()/(60-85) 92/63  SpO2:  [93 %-97 %] 94 %  I/O last 3 completed shifts:  In: 1238.68 [P.O.:600; I.V.:638.68]  Out: 1741 [Urine:1741]    Vitals:    12/31/18 0000 01/01/19 0400 01/02/19 0400 01/03/19 0400   Weight: 48.1 kg (106 lb 0.7 oz) 47.8 kg (105 lb 6.1 oz) 48.7 kg (107 lb 5.8 oz) 49.2 kg (108 lb 7.5 oz)    01/04/19 0400   Weight: 46.4 kg (102 lb 4.7 oz)     CVP 9  PA 46/28  MVO2 45  CO/CI 2.8/2  SVR 1800    GEN: alert and oriented  HEENT:  swan  CV:  Regular rate and rhythm, holosystolic murmur, click  LUNGS:  on nasal canula, inspiratory rales at bases  GROIN: no hematoma  ABD:  Active bowel sounds, soft, non-tender/non-distended.  No rebound/guarding/rigidity.  EXT:  No edema or cyanosis.  Hands/feet warm to touch with good signs of peripheral perfusion.          Data:   BMP  Recent Labs   Lab 01/04/19  0415 01/03/19  1957 01/03/19  1611 01/03/19  0319  01/02/19  1545     --  136 133  --  135   POTASSIUM 3.9 4.3 4.4 4.0   < > 3.9   CHLORIDE 97  --  102 96  --  100   KARINA 8.7  --  7.9* 8.3*  --  8.5   CO2 26  --  27 30  --  28   BUN 30  --  26 28  --  27   CR 1.26*  --  1.12* 1.26*  --  1.32*   *  --  99 134*  --  117*    < > = values in this interval not displayed.     LFTs  Recent Labs   Lab 12/30/18  1150 12/30/18  0110   ALKPHOS 53 57   AST 45 56*   ALT 84* 92*   BILITOTAL 1.2 1.0   PROTTOTAL 7.0 7.2   ALBUMIN 3.6 3.7      CBC  Recent Labs   Lab 01/04/19  0415 01/02/19  0345 12/30/18  1150 12/30/18  0447   WBC 9.6 9.0 9.2 8.6   RBC 3.29* 3.12* 3.14* 3.20*   HGB 9.4* 9.0* 9.3* 9.2*   HCT 30.6* 29.5* 27.7* 28.4*   MCV 93 95 88  89   MCH 28.6 28.8 29.6 28.8   MCHC 30.7* 30.5* 33.6 32.4   RDW 14.9 15.4* 15.2* 14.8    179 183 170     INR  Recent Labs   Lab 01/04/19  0415 01/03/19  0319 01/02/19  0345 01/01/19  0326   INR 1.52* 1.40* 1.46* 1.85*     Echo 12/30  Left Ventricle  Left ventricular wall thickness is normal. Severe left ventricular dilation is  present. Severely (EF 10-20%) reduced left ventricular function is present.  Diastolic function not assessed due to presence of prosthetic mitral valve.  Severe diffuse hypokinesis is present.     Right Ventricle  Mild to moderate right ventricular dilation is present. Global right  ventricular function is moderately to severely reduced. A pacemaker lead is  noted in the right ventricle.     Atria  The atria cannot be assessed.     Mitral Valve  s/p 29mm St. Reece MVR(1989). The mean mitral valve gradient is 3.8 mmHg.  Prosthetic valve is well seated, no paravalvular leak and has normal  gradients.        Aortic Valve  s/p 23 mm St. Reece AVR (1998). increased gradient across AV Mean gradient of  23mmHg, peak gradient of 43, Peak velocity of 3.2m/s.     Tricuspid Valve  The tricuspid valve is normal. Mild tricuspid insufficiency is present. Right  ventricular systolic pressure is 25mmHg above the right atrial pressure.     Pulmonic Valve  The pulmonic valve is normal. Trace pulmonic insufficiency is present.     Vessels  Visualized poriton of the aorta are normal in size (proximal ascending aorta  measures 3cm). The pulmonary artery is normal. Unable to assess mean RA  pressure given the patient is on a ventilator. IVC is dilated.     Pericardium  No pericardial effusion is present.

## 2019-01-04 NOTE — PROGRESS NOTES
01/04/19 1400   Quick Adds   Type of Visit Initial Occupational Therapy Evaluation   Living Environment   Lives With spouse   Living Arrangements house   Home Accessibility stairs to enter home;stairs within home  (2 to enter 12 within rails at both sides)   Self-Care   Usual Activity Tolerance good   Current Activity Tolerance moderate   Equipment Currently Used at Home none   Functional Level   Ambulation 0-->independent   Transferring 0-->independent   Toileting 0-->independent   Bathing 0-->independent   Dressing 0-->independent   Eating 0-->independent   Communication 0-->understands/communicates without difficulty   Swallowing 0-->swallows foods/liquids without difficulty   Cognition 0 - no cognition issues reported   Fall history within last six months no   General Information   Referring Physician Carl Brito   Patient/Family Goals Statement maintain or build functional endurance and strength   Additional Occupational Profile Info/Pertinent History of Current Problem Nicolette Adan is a 68 year old female with history of HFrEF 2/2 nonischemic dilated cardiomyopathy (EF 15%, 12/21/18), rheumatic heart disease s/p mechanical MV and AV replacement, atrial fibrillation, and sudden cardiac arrest ventricular fibrillation in 2008 s/p dual chamber ICD with recurrent ICD shocks,  She presented to [facility] for epigastric discomfort and was found to have recurrent VT with ICD shocks. Transferred to [facility] with refractory VT and for consideration for advanced therapies IABP was placed due to hypotension despite vasopressin.  She was extubated 12/31.  Morning of 1/2, patient had 60 second run of VT.  EKG not able to capture run, but ectopy appeared to be RBBB in nature, inferior axis, potentially LV apical or LVOT focus.  Due to recurrent NSVT and VT and hemodynamics suggesting cold and dry physiology, we will start evaluation for potential LVAD or transplant   Precautions/Limitations fall  precautions;other (see comments)  (swan)   General Observations pt with supportive SO at bedside   Cognitive Status Examination   Orientation orientation to person, place and time   Level of Consciousness alert   Follows Commands (Cognition) WNL   Memory intact   Cognitive Comment will continue to monitor.    Visual Perception   Visual Perception No deficits were identified   Sensory Examination   Sensory Quick Adds No deficits were identified   Range of Motion (ROM)   ROM Quick Adds No deficits were identified   ROM Comment B UE/LE WFL   Strength   Strength Comments B UE/LE grossly 3+ to 4/5   Hand Strength   Hand Strength Comments mild deficits in B hands.    Coordination   Coordination Comments no deficits.    Mobility   Bed Mobility Bed mobility skill: Supine to sit   Bed Mobility Comments min assist.    Transfer Skill: Bed to Chair/Chair to Bed   Level of [place]: Bed to Chair contact guard   Transfer Skill: Sit to Stand   Level of [place]: Sit/Stand contact guard   Lower Body Dressing   Level of [place]: Dress Lower Body moderate assist (50% patients effort)   Instrumental Activities of Daily Living (IADL)   Previous Responsibilities meal prep;housekeeping;laundry;shopping;yardwork;medication management;finances;driving   IADL Comments Pt and SO want to return to traveling this spring.    Activities of Daily Living Analysis   Impairments Contributing to Impaired Activities of Daily Living balance impaired;strength decreased  (decreased activity tolerance. )   General Therapy Interventions   Planned Therapy Interventions ADL retraining;IADL retraining;strengthening;transfer training;home program guidelines;progressive activity/exercise;risk factor education   Clinical Impression   Criteria for Skilled Therapeutic Interventions Met yes, treatment indicated   OT Diagnosis decreased ADL I   Assessment of Occupational Performance 1-3 Performance Deficits   Identified Performance Deficits homemaking,  "dressing, leisure, community mobility.    Clinical Decision Making (Complexity) Low complexity   Therapy Frequency 5 times/wk   Predicted Duration of Therapy Intervention (days/wks) 2 weeks   Anticipated Discharge Disposition Home with Outpatient Therapy   Risks and Benefits of Treatment have been explained. Yes   Patient, Family & other staff in agreement with plan of care Yes   Clinical Impression Comments Pt presents to OT with general deconditioning and decreased activity tolerance leading to decreased ADL I. Pt to benefit from skilled OT/CR intervention to maximize ADL I/safety.    [facility] TM \"6 Clicks\"   2016, Trustees of [facility], under license to [employer].  All rights reserved.   6 Clicks Short Forms Daily Activity Inpatient Short Form   [facility] AM-PAC  \"6 Clicks\" Daily Activity Inpatient Short Form   1. Putting on and taking off regular lower body clothing? 3 - A Little   2. Bathing (including washing, rinsing, drying)? 3 - A Little   3. Toileting, which includes using toilet, bedpan or urinal? 3 - A Little   4. Putting on and taking off regular upper body clothing? 3 - A Little   5. Taking care of personal grooming such as brushing teeth? 4 - None   6. Eating meals? 4 - None   Daily Activity Raw Score (Score out of 24.Lower scores equate to lower levels of function) 20   Total Evaluation Time   Total Evaluation Time (Minutes) 5     "

## 2019-01-04 NOTE — PROGRESS NOTES
CLINICAL NUTRITION SERVICES - brief note. See 1/2 note for full assessment.  *Consulted for Pre-VAD planning    Pt assessed by RD on 1/2. Attempted to visit pt today several times, pt busy w/ therapies and other providers when attempted to give pre-VAD/transplant education    Education to be completed as able/appropriate.    RD will continue to follow.    Charmaine Sagastume RD, LD, [facility]  CVICU Dietitian  Pager: [phone]

## 2019-01-04 NOTE — PLAN OF CARE
D/I/A: A&Ox4, moves all extremities, up to the chair with assist x2 today. AV paced at 70 bpm, no VT or other ectopy noted, maps 70-80s. [place] @ 64, SvO2 40s, PAP 40-50/20s, CVP 12-14, CI 1.9-2.9, SVR 1803.4-2018.2. Heparin drip at 700 unit(s)/hr, stapped at 1000 for peripheral nerve block procedure (for VT), restarted at 1545, recheck 10a at 2200. Dukes in place, output decreased ~ 10ml/hr at 1200, gave 20 mg lasix at 1600. Large loose BM x1. LVAD workup started at 1700  P: Abd US, monitor UO at hemodynamic numbers, continue with plan of care.

## 2019-01-04 NOTE — PLAN OF CARE
Discharge Planner OT   Patient plan for discharge: home with assist as needed  Current status: pt with balance deficits and below baseline functional endurance, see progress note for 6MWT results.   Barriers to return to prior living situation: deconditioning and balance deficits.   Recommendations for discharge: anticipate home with assist and outpatient CR.   Rationale for recommendations: PT below baseline in mobility at this time, however anticipate if pt continues to progress in therapies will be able to progress to home with assist as needed and outpatient CR to maximize ADL I/safety.        Entered by: Alex Villela 01/04/2019 2:38 PM

## 2019-01-05 NOTE — PROGRESS NOTES
01/05/19 0934   Quick Adds   Type of Visit Initial PT Evaluation   Living Environment   Lives With spouse   Living Arrangements house   Home Accessibility stairs to enter home;stairs within home   Living Environment Comment 3 stairs to enter home, multi level house with most # of stairs at one time = 12 to access bedroom. Has a bedroom/bath on main level that could use if needed.  able to assist 24 hours a day   Self-Care   Usual Activity Tolerance good   Current Activity Tolerance moderate   Equipment Currently Used at Home shower chair   Functional Level Prior   Ambulation 0-->independent   Transferring 0-->independent   Toileting 0-->independent   Bathing 0-->independent   Communication 0-->understands/communicates without difficulty   Swallowing 0-->swallows foods/liquids without difficulty   Cognition 0 - no cognition issues reported   Fall history within last six months yes   Number of times patient has fallen within last six months 1   Prior Functional Level Comment 1 fall d/t VT, patient is indep with functional mobility   General Information   Onset of Illness/Injury or Date of Surgery - Date 12/30/18   Referring Physician Gregor Díaz MD   Patient/Family Goals Statement to return home   Pertinent History of Current Problem (include personal factors and/or comorbidities that impact the POC) Nicolette Adan is a 68 year old female with history of 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 Transferred to [facility] with refractory VT and for consideration for advanced therapies.   Precautions/Limitations fall precautions   General Observations george   General Info Comments  activity: as tolerated   Cognitive Status Examination   Orientation orientation to person, place and time   Level of Consciousness alert   Follows Commands and Answers Questions 100% of the time;able to follow multistep instructions   Personal Safety and Judgment intact   Memory intact   Pain Assessment   Patient Currently in Pain No   Posture    Posture Not impaired   Range of Motion (ROM)   ROM Comment B LE WFL    Strength   Strength Comments good functional strength as noted by level of assist with bed mobility, transfers; sit <> stands x6 indep   Bed Mobility   Bed Mobility Comments indep   Transfer Skills   Transfer Comments SBA only for lines   Gait   Gait Comments FWW ~75 feet with SBA   Balance   Balance Comments dynamic balance impaired - presenting with lateral path deviations when ambulating without walker   Coordination   Coordination no deficits were identified   Muscle Tone   Muscle Tone no deficits were identified   General Therapy Interventions   Planned Therapy Interventions balance training;gait training;neuromuscular re-education;strengthening;risk factor education;home program guidelines;progressive activity/exercise   Clinical Impression   Criteria for Skilled Therapeutic Intervention yes, treatment indicated   PT Diagnosis impaired functional mobility   Influenced by the following impairments impaired balance, weakness, decreased activity tolerance   Functional limitations due to impairments decreased indep wtih functional mobility   Clinical Presentation Evolving/Changing   Clinical Presentation Rationale complex medical history, compromised status   Clinical Decision Making (Complexity) Moderate complexity   Therapy Frequency` 2 times/week   Predicted Duration of Therapy Intervention (days/wks) 3 weeks   Anticipated Equipment Needs at Discharge (to be determined)   Anticipated Discharge Disposition Home with Assist;Home with Outpatient Therapy  (TBD)   Risk & Benefits of therapy have been  "explained Yes   Patient, Family & other staff in agreement with plan of care Yes   Clinical Impression Comments Pt currently presenting with weakness, decreased activity tolerance, and impaired balance impacting indep and safety with functional mobility. pt would benefit from skilled PT while inpatient to address balance deficits and strength.    [facility] TM \"6 Clicks\"   2016, Trustees of [facility], under license to [employer]  All rights reserved.   6 Clicks Short Forms Basic Mobility Inpatient Short Form   [facility] AM-PAC  \"6 Clicks\" V.2 Basic Mobility Inpatient Short Form   1. Turning from your back to your side while in a flat bed without using bedrails? 4 - None   2. Moving from lying on your back to sitting on the side of a flat bed without using bedrails? 4 - None   3. Moving to and from a bed to a chair (including a wheelchair)? 4 - None   4. Standing up from a chair using your arms (e.g., wheelchair, or bedside chair)? 4 - None   5. To walk in hospital room? 3 - A Little   6. Climbing 3-5 steps with a railing? 2 - A Lot   Basic Mobility Raw Score (Score out of 24.Lower scores equate to lower levels of function) 21   Total Evaluation Time   Total Evaluation Time (Minutes) 10     "

## 2019-01-05 NOTE — PROGRESS NOTES
[facility] Cardiology Progress Note           Assessment and Plan:   Nicolette Adan is a 68 year old female with history of HFrEF 2/2 nonischemic dilated cardiomyopathy (EF 15%, 12/21/18), rheumatic heart disease s/p mechanical MV and AV replacement, atrial fibrillation, and sudden cardiac arrest ventricular fibrillation in 2008 s/p dual chamber ICD with recurrent ICD shocks,  She presented to [facility] for epigastric discomfort and was found to have recurrent VT with ICD shocks. Transferred to [facility] with refractory VT and for consideration for advanced therapies.    IABP was placed due to hypotension despite vasopressin.  She was extubated 12/31.  Morning of 1/2, patient had 60 second run of VT.  EKG not able to capture run, but ectopy appeared to be RBBB in nature, inferior axis, potentially LV apical or LVOT focus.  Due to recurrent NSVT and VT and hemodynamics suggesting cold and dry physiology, we will start evaluation for potential LVAD or transplant (O blood type)     # VT storm  # Hx of cardiac arrest 2/2 VF (2008), s/p dual chamber ICD with recurrent shocks  # Prolonged QT  Multiple recent shocks from ICD including few days prior had syncopal episode and was shocked. Prolonged QT seen at outside hospital - PTA meds stopped at OSH: prozac, lexapro, digoxin. Poor candidate for VT ablation due to mechanical aortic and mitral valves. Angiogram done on 12/24/18 with normal coronary arteries. Transferred on amiodarone drip, lidocaine gtt and propranolol TID.  Lidocaine stopped due to elevated level.  IABP placed on admission here 12/30, removed 1/2.   - attempted to pace at higher atrial rate, however MAP did not improve and her VT is quite slow    - amiodarone 400mg daily, she has had adequate load based on amount given in [place]   - beta blocker held here due to low output   - ranolazine 500mg BID   - paravertebral block as temporizing measure, performed by anesthesiology 1/3   -  transplant/LVAD evaluation   - swan qiana catheter for hemodynamics, if PICC sat and PA sat correlate, can remove after next check  - Monitor lytes, on replacement protocol     # HFrEF exacerbation, EF 15% 12/21/18  # Non ischemic dilated cardiomyopathy  - diuresis: increasing scheduled furosemide to 40 mg daily, CVP 10 this AM  - GDMT: on hold in the setting of shock  - vasodilators: holding in the setting of fixed obstruction  - Mechanical circulatory support: Currently undergoing evaluation for transplant and LVAD  -Continue leave and Walton-Qiana catheter, the next check we will check a mixed venous from both ports.  If equivalent, can consider removal of [place]-Qiana at that time  - If patient remains hypertensive and SVR remains high, may try slight afterload reduction with hydralazine    # Rheumatic heart disease   # Mitral valve replacement, 29mm St. Reece 1989  # Aortic valve replacement, 23 mm St. Reece 1998  # Atrial fibrillation, CHADSVASc ~ 4, on warfarin goal 2.5-3.5  Paced on tele.    - Heparin gtt, giving warfarin for now, though may need to hold if potential procedures arise.   - Monitor INR     # CKD III - NANCY resolved, Cr 1.1, eGFR 50, component of low output and cardiorenal.     Chronic issues:   - Depression: Continue sertraline   - DM: Monitor sugars, hypoglycemia protocol   - Non-severe malnutrition in the context of acute on chronic illness - seeing nutrition here   - Anemia Secondary to CKD and chronic disease. No sign of active bleed     Floor Care  Fluids: none  Food: cardiac diet  Electrolyte repletion: prn  Analgesia: none  Sedation: none  DVT ppx: heparin  Stress Ulcer ppx: none  Glycemic Control: none  Catheters/tubes: swan, remove loaiza  Lines: pIV, remove connor, consider PICC  Therapies: PT/OT  Consults: LVAD/transplant workup  Code Status: Full  Discharge plan: pending workup  Family: updated today in person     Patient was discussed with Dr. Lopez.      Miles Osborn MD  Cardiology  Fellow       Subjective:     Overnight, the patient went to the bathroom and had a syncopal event.  She fell and hit her face.  CT scan was negative.  Device interrogation revealed multiple episodes of slow ventricular tachycardia which responded to antitachycardia pacing.          Medications:       amiodarone  400 mg Oral Daily     aspirin  81 mg Oral Daily     ceFAZolin  1 g Intravenous Q12H     docusate sodium  100 mg Oral BID     [START ON 1/6/2019] furosemide  40 mg Oral Daily     guaiFENesin  600 mg Oral BID     ranolazine  500 mg Oral BID     sertraline  50 mg Oral Daily     sodium chloride (PF)  3 mL Intracatheter Q8H     warfarin  1 mg Oral ONCE at 18:00       HEParin 700 Units/hr (01/05/19 1500)     - MEDICATION INSTRUCTIONS -       - MEDICATION INSTRUCTIONS -       Warfarin Therapy Reminder              Objective:          Physical Exam:   Temp:  [97.4  F (36.3  C)-98.3  F (36.8  C)] 98.3  F (36.8  C)  Heart Rate:  [68-76] 75  Resp:  [13-34] 19  BP: ()/(53-90) 106/77  SpO2:  [90 %-100 %] 95 %  I/O last 3 completed shifts:  In: 1282.36 [P.O.:240; I.V.:1042.36]  Out: 550 [Urine:550]    Vitals:    01/01/19 0400 01/02/19 0400 01/03/19 0400 01/04/19 0400   Weight: 47.8 kg (105 lb 6.1 oz) 48.7 kg (107 lb 5.8 oz) 49.2 kg (108 lb 7.5 oz) 46.4 kg (102 lb 4.7 oz)    01/05/19 0400   Weight: 45.9 kg (101 lb 3.1 oz)     CVP 9  PA 46/28  MVO2 45  CO/CI 2.8/2  SVR 1800    GEN: alert and oriented  HEENT:  swan  CV:  Regular rate and rhythm, holosystolic murmur, click  LUNGS:  on nasal canula, inspiratory rales at bases  GROIN: no hematoma  ABD:  Active bowel sounds, soft, non-tender/non-distended.  No rebound/guarding/rigidity.  EXT:  No edema or cyanosis.  Hands/feet warm to touch with good signs of peripheral perfusion.          Data:   BMP  Recent Labs   Lab 01/05/19  0346 01/04/19  2018 01/04/19  1629 01/04/19  0415    132* 137 134   POTASSIUM 4.9 5.3 3.3* 3.9   CHLORIDE 99 97 104 97   KARINA 8.5 8.8 7.1*  8.7   CO2 30 28 24 26   BUN 31* 33* 29 30   CR 1.35* 1.54* 1.13* 1.26*   * 174* 86 100*     LFTs  Recent Labs   Lab 01/05/19  0346 12/30/18  1150 12/30/18  0110   ALKPHOS 50 53 57   AST 18 45 56*   ALT 36 84* 92*   BILITOTAL 0.7 1.2 1.0   PROTTOTAL 6.8 7.0 7.2   ALBUMIN 3.2* 3.6 3.7      CBC  Recent Labs   Lab 01/05/19  0346 01/04/19  0415 01/02/19  0345 12/30/18  1150   WBC 10.5 9.6 9.0 9.2   RBC 2.97* 3.29* 3.12* 3.14*   HGB 8.6* 9.4* 9.0* 9.3*   HCT 27.8* 30.6* 29.5* 27.7*   MCV 94 93 95 88   MCH 29.0 28.6 28.8 29.6   MCHC 30.9* 30.7* 30.5* 33.6   RDW 15.0 14.9 15.4* 15.2*    218 179 183     INR  Recent Labs   Lab 01/05/19  0346 01/04/19  0415 01/03/19  0319 01/02/19  0345   INR 1.88* 1.52* 1.40* 1.46*     Echo 12/30  Left Ventricle  Left ventricular wall thickness is normal. Severe left ventricular dilation is  present. Severely (EF 10-20%) reduced left ventricular function is present.  Diastolic function not assessed due to presence of prosthetic mitral valve.  Severe diffuse hypokinesis is present.     Right Ventricle  Mild to moderate right ventricular dilation is present. Global right  ventricular function is moderately to severely reduced. A pacemaker lead is  noted in the right ventricle.     Atria  The atria cannot be assessed.     Mitral Valve  s/p 29mm St. Reece MVR(1989). The mean mitral valve gradient is 3.8 mmHg.  Prosthetic valve is well seated, no paravalvular leak and has normal  gradients.        Aortic Valve  s/p 23 mm St. Reece AVR (1998). increased gradient across AV Mean gradient of  23mmHg, peak gradient of 43, Peak velocity of 3.2m/s.     Tricuspid Valve  The tricuspid valve is normal. Mild tricuspid insufficiency is present. Right  ventricular systolic pressure is 25mmHg above the right atrial pressure.     Pulmonic Valve  The pulmonic valve is normal. Trace pulmonic insufficiency is present.     Vessels  Visualized poriton of the aorta are normal in size (proximal ascending  aorta  measures 3cm). The pulmonary artery is normal. Unable to assess mean RA  pressure given the patient is on a ventilator. IVC is dilated.     Pericardium  No pericardial effusion is present.

## 2019-01-05 NOTE — PLAN OF CARE
Patient had a fall at 1945. Found to be in VT. ICD ATP out of rhythm. Neuro check WNL, no patient complaints. Head CT negative.

## 2019-01-05 NOTE — PLAN OF CARE
D/I/A: A&Ox4, moves all extremities, no neuro deficits. AV paced at 70 bpm, rare PVCs. A line removed, cuff Maps 80-90s. [place] @ 64, SvO2 45-48, PAP 50/10-20s, CVP 12-14, CI 1.9-2.0, SVR 1767.3-2552.7. Heparin drip at 700 unit(s)/hr, nipride drip started at 0800 at 0.25 mcg/kg/min . Dukes removed at 1100, voided once on toilet (large amount, unmeasured) since removal. Walked twice around unit with assist x2 (due to equipment), passed 6 minute walk test. Replaced potassium level of 3.3 at 1800.  P: Continue LVAD/heart transplant work up, monitor UO at hemodynamic numbers, continue with plan of care.

## 2019-01-05 NOTE — PLAN OF CARE
Increased bruising around nose and eyes from fall last evening. No neuro defecits, no HA, appears appropriate. CI 2.0, SvO2 40. PA 55/25, CVP 12. Nipride up to 0.75 after increasing SVR, but decreased to 0.5 with low MAPs. Lungs clear/dim. On RA. Elevated SVR. Plan to bladder scan.     Riky Noriega RN 01/05/19 6:18 AM    Hours of cares [id number]

## 2019-01-05 NOTE — PLAN OF CARE
Discharge Planner PT   Patient plan for discharge: safe to discharge home at this time, pending medical course  Current status: pt demos indep bed mobility and transfers. Ambulates initially with FWW d/t feeling unsteady; progressing to ambulation without device 300 feet, needs CGA d/t occasional path deviations. RN present during gait d/t swan.   Barriers to return to prior living situation: level of assist, medical stability  Recommendations for discharge: home with assist PRN; pending medical course, pt may need rehab  Rationale for recommendations: see above       Entered by: Lauren Bryant 01/05/2019 10:33 AM

## 2019-01-06 NOTE — PROGRESS NOTES
PFT test, best of three tries sitting on edge of bed.    Patient    Predicted    FEV1 0.53    27%    FVC  0.60   23%    FEV1/FVC  93%   122%    PEF 1.5 L/s   29%    Rt to continue to follow.

## 2019-01-06 NOTE — PLAN OF CARE
Adult Inpatient Plan of Care  Plan of Care Review  1/6/2019 1748 by Alejandro Beckwith, RN  Note  Remains hemodynamically stable in CV ICU with no runs of VT today.    Neuro: Intact.  CV: Paced. No runs of VT noted. Hemodynamically stable.   GI/: Tolerates PO intake. Large BM. Diuresed with with Lasix 20 mg IV today. Voiding large amounts of urine.   Skin: Ecchymotic facial abrasion.   Access: Right upper arm PICC/ Left wrist PIV.  Activity: Ambulated with nurse x3 today.    Plan: Monitor for ectopy and VT with the potential of transitioning to floor status.

## 2019-01-06 NOTE — PLAN OF CARE
Uneventful night. Paced, 10beat runs VT at 0300. Asymptomatic. Lytes okay. Cough clearing up. On RA. Voiding adequately. No HA, neuros intact.     Riky Noriega RN 01/06/19 5:28 AM    Hours of cares [id number]

## 2019-01-06 NOTE — PROGRESS NOTES
[facility] Cardiology Progress Note           Assessment and Plan:   Nicolette Adan is a 68 year old female with history of HFrEF 2/2 nonischemic dilated cardiomyopathy (EF 15%, 12/21/18), rheumatic heart disease s/p mechanical MV and AV replacement, atrial fibrillation, and sudden cardiac arrest ventricular fibrillation in 2008 s/p dual chamber ICD with recurrent ICD shocks,  She presented to [facility] for epigastric discomfort and was found to have recurrent VT with ICD shocks. Transferred to [facility] with refractory VT and for consideration for advanced therapies.    IABP was placed due to hypotension despite vasopressin.  She was extubated 12/31.  Morning of 1/2, patient had 60 second run of VT.  EKG not able to capture run, but ectopy appeared to be RBBB in nature, inferior axis, potentially LV apical or LVOT focus.  Due to recurrent NSVT and VT and hemodynamics suggesting cold and dry physiology, we will start evaluation for potential LVAD or transplant (O blood type)     # VT storm  # Hx of cardiac arrest 2/2 VF (2008), s/p dual chamber ICD with recurrent shocks  # Prolonged QT  Multiple recent shocks from ICD including few days prior had syncopal episode and was shocked. Prolonged QT seen at outside hospital - PTA meds stopped at OSH: prozac, lexapro, digoxin. Poor candidate for VT ablation due to mechanical aortic and mitral valves. Angiogram done on 12/24/18 with normal coronary arteries. Transferred on amiodarone drip, lidocaine gtt and propranolol TID.  Lidocaine stopped due to elevated level.  IABP placed on admission here 12/30, removed 1/2.   - attempted to pace at higher atrial rate, however MAP did not improve and her VT is quite slow    - amiodarone 400mg daily, she has had adequate load based on amount given in [place]   - beta blocker held here due to low output   - ranolazine 500mg BID   - paravertebral block as temporizing measure, performed by anesthesiology 1/3   -  transplant/LVAD evaluation  - Monitor lytes, on replacement protocol     # HFrEF exacerbation, EF 15% 12/21/18  # Non ischemic dilated cardiomyopathy  - diuresis: increasing scheduled furosemide to 40 mg daily, give extra 20 mg IV Lasix given elevated neck veins today  - GDMT: on hold in the setting of shock  - vasodilators: holding in the setting of fixed obstruction  - Mechanical circulatory support: Currently undergoing evaluation for transplant and LVAD  - PICC MvO2 calculating CI 2.3 today    # Rheumatic heart disease   # Mitral valve replacement, 29mm St. Reece 1989  # Aortic valve replacement, 23 mm St. Reece 1998  # Atrial fibrillation, CHADSVASc ~ 4, on warfarin goal 2.5-3.5  Paced on tele.    - Heparin gtt, giving warfarin for now, though may need to hold if potential procedures arise.   - Monitor INR     # CKD III - NANCY resolved, Cr 1.1, eGFR 50, component of low output and cardiorenal.     Chronic issues:   - Depression: Continue sertraline   - DM: Monitor sugars, hypoglycemia protocol   - Non-severe malnutrition in the context of acute on chronic illness - seeing nutrition here   - Anemia Secondary to CKD and chronic disease. No sign of active bleed     Floor Care  Fluids: none  Food: cardiac diet  Electrolyte repletion: prn  Analgesia: none  Sedation: none  DVT ppx: heparin  Stress Ulcer ppx: none  Glycemic Control: none  Catheters/tubes: swan, remove loaiza  Lines: pIV, remove connor, consider PICC  Therapies: PT/OT  Consults: LVAD/transplant workup  Code Status: Full  Discharge plan: pending workup  Family: updated today in person     Patient was discussed with Dr. Lopez.      Miles Osborn MD  Cardiology Fellow       Subjective:     No acute overnight events.  No episodes of ventricular tachycardia.          Medications:       amiodarone  400 mg Oral Daily     aspirin  81 mg Oral Daily     ceFAZolin  1 g Intravenous Q12H     docusate sodium  100 mg Oral BID     furosemide  40 mg Oral Daily      guaiFENesin  600 mg Oral BID     ranolazine  500 mg Oral BID     sertraline  50 mg Oral Daily     sodium chloride (PF)  3 mL Intracatheter Q8H     warfarin  3 mg Oral ONCE at 18:00       HEParin 550 Units/hr (01/06/19 1200)     - MEDICATION INSTRUCTIONS -       - MEDICATION INSTRUCTIONS -       Warfarin Therapy Reminder              Objective:          Physical Exam:   Temp:  [97.6  F (36.4  C)-98  F (36.7  C)] 97.8  F (36.6  C)  Heart Rate:  [69-83] 73  Resp:  [10-42] 26  BP: ()/(60-91) 110/70  SpO2:  [91 %-97 %] 96 %  I/O last 3 completed shifts:  In: 1169.02 [P.O.:360; I.V.:809.02]  Out: 1050 [Urine:1050]    Vitals:    01/02/19 0400 01/03/19 0400 01/04/19 0400 01/05/19 0400   Weight: 48.7 kg (107 lb 5.8 oz) 49.2 kg (108 lb 7.5 oz) 46.4 kg (102 lb 4.7 oz) 45.9 kg (101 lb 3.1 oz)    01/06/19 0700   Weight: 44.4 kg (97 lb 14.2 oz)       GEN: alert and oriented  HEENT: facial bruising  CV:  Regular rate and rhythm, mechanical murmurs with spared S2  LUNGS:  on nasal canula, inspiratory rales at bases  GROIN: no hematoma  ABD:  Active bowel sounds, soft, non-tender/non-distended.  No rebound/guarding/rigidity.  EXT:  No edema or cyanosis.  Hands/feet warm to touch with good signs of peripheral perfusion.          Data:   BMP  Recent Labs   Lab 01/06/19  0314 01/05/19  1655 01/05/19  0346 01/04/19  2018    133 134 132*   POTASSIUM 4.0 4.2 4.9 5.3   CHLORIDE 97 97 99 97   KARINA 8.4* 8.6 8.5 8.8   CO2 27 28 30 28   BUN 29 29 31* 33*   CR 1.38* 1.31* 1.35* 1.54*   * 102* 100* 174*     LFTs  Recent Labs   Lab 01/05/19  0346   ALKPHOS 50   AST 18   ALT 36   BILITOTAL 0.7   PROTTOTAL 6.8   ALBUMIN 3.2*      CBC  Recent Labs   Lab 01/06/19  0314 01/05/19  0346 01/04/19  0415 01/02/19  0345   WBC 8.9 10.5 9.6 9.0   RBC 3.06* 2.97* 3.29* 3.12*   HGB 8.9* 8.6* 9.4* 9.0*   HCT 28.7* 27.8* 30.6* 29.5*   MCV 94 94 93 95   MCH 29.1 29.0 28.6 28.8   MCHC 31.0* 30.9* 30.7* 30.5*   RDW 15.3* 15.0 14.9 15.4*     219 218 179     INR  Recent Labs   Lab 01/06/19  0314 01/05/19  0346 01/04/19  0415 01/03/19  0319   INR 2.06* 1.88* 1.52* 1.40*     Echo 12/30  Left Ventricle  Left ventricular wall thickness is normal. Severe left ventricular dilation is  present. Severely (EF 10-20%) reduced left ventricular function is present.  Diastolic function not assessed due to presence of prosthetic mitral valve.  Severe diffuse hypokinesis is present.     Right Ventricle  Mild to moderate right ventricular dilation is present. Global right  ventricular function is moderately to severely reduced. A pacemaker lead is  noted in the right ventricle.     Atria  The atria cannot be assessed.     Mitral Valve  s/p 29mm St. Reece MVR(1989). The mean mitral valve gradient is 3.8 mmHg.  Prosthetic valve is well seated, no paravalvular leak and has normal  gradients.        Aortic Valve  s/p 23 mm St. Reece AVR (1998). increased gradient across AV Mean gradient of  23mmHg, peak gradient of 43, Peak velocity of 3.2m/s.     Tricuspid Valve  The tricuspid valve is normal. Mild tricuspid insufficiency is present. Right  ventricular systolic pressure is 25mmHg above the right atrial pressure.     Pulmonic Valve  The pulmonic valve is normal. Trace pulmonic insufficiency is present.     Vessels  Visualized poriton of the aorta are normal in size (proximal ascending aorta  measures 3cm). The pulmonary artery is normal. Unable to assess mean RA  pressure given the patient is on a ventilator. IVC is dilated.     Pericardium  No pericardial effusion is present.

## 2019-01-06 NOTE — PLAN OF CARE
Assumed care from [id number].     Pt A&O x 4, makes needs known. PERRLA. AV paced rhythm. HR 70-80s. Nipride d/c'ed. MAPs stable: 70-90s. Heparin at 700 units/hr. [place]/R internal jugular catheter removed w/o complication. LSC/diminished. C/o cough. Encouraged IS use and Mucinex ordered. Adequate UOP, additional dose of PO Lasix 20 mg given w/ good response. Bruising to eyes/nose, otherwise no changes to skin. Assist of 1 w/ mobility, ambulated in hallway this morning w/ PT. Right PICC line placed today. No acute events. POC reviewed w/ patient and spouse.

## 2019-01-06 NOTE — PROGRESS NOTES
"CLINICAL NUTRITION SERVICES     Nutrition Prescription    RECOMMENDATIONS FOR MDs/PROVIDERS TO ORDER:  For all recommendations, see nutrition assessment note on 1/2/19.     Recommendations already ordered by Registered Dietitian (RD):  Prn oral supplement order     Received consult for \"Poor PO.\"    Diet: Pt is on a 2 g sodium diet. Per nursing flowsheets, pt consumed 75% of a meal 1/3, 100% of a meal with a good appetite 1/4, and 25-75% of meals with a good appetite 1/5. Per pt, lack of appetite due to feeling full of fluid.     INTERVENTIONS:  Implementation:  Medical food supplement therapy: Provided oral supplement menu. Per pt, has Premier Protein at home. She would not like to schedule oral supplements at this time but she would like to try some on her own. Seems interested in trying Magic Cup and possibly Boost Plus. Placed prn snack/supplement order. She may request snacks/supplements prn.     Nutrition education: Encouraged small, frequent meals. Also, encouraged high protein/kcal options and gave examples. Pt had a question on potassium intake of foods as she previously limited her potassium. Discussed that her potassium is currently normal and there is not a need to restrict K+ at this time. As mentioned by previous RD, monitor with her doctors in case this changes. Provided Nutrition Care Manual handout depicting amounts of potassium in various foods/beverages.     Nutrition education for nutrition relationship to health/disease: Discussed nutrition education for potential upcoming LVAD surgery (received consult this admission for heart failure pre VAD planning). Mentioned potential need for feeding tube and TFs for nutrition support post-op. Discussed the importance of adequate oral intake post-op. Emphasized protein intake. Discussed and encouraged oral supplements post-op. Rec continue low-sodium diet at this time (already limits sodium to 2 g per day). Pt with no questions on current diet order. Pt " seemed agreeable to nutrition plan if LVAD is placed in the future. BMI is at the low end of the normal range, Body mass index is 19.12 kg/m . Pt is at 98% of IBW.     Follow up/Monitoring:  Will continue to follow pt.    Lucie Yusuf, MS, RD, LD, [facility]   Saturday/Sunday Pgr:  [phone]       RD pager: [phone]

## 2019-01-07 NOTE — PLAN OF CARE
OT/CR 6B: Discharge Planner OT   Patient plan for discharge: home pending medical course as pt is work up for LVAD vs OHT  Current status: Able to progress pt functional endurance through hallway ambulation.  Pt able to walk approx 750ft with SBA-CGA, LOB x 2 and 1 standing rest break.  HR tachy to 130 otherwise AVSS on RA  Barriers to return to prior living situation: acute medical needs, impaired balance  Recommendations for discharge: Home with assist and CR pending medical course  Rationale for recommendations: Pt has good family support for return to home, tolerating ambulation of household distances well.       Entered by: Bette Vasquez 01/07/2019 4:22 PM

## 2019-01-07 NOTE — CONSULTS
[facility] CARDIOTHORACIC SURGERY CONSULT     Nicolette Adan MRN# [id number]   YOB: 1950 Age: 68 year old      Date of Admission:  12/30/2018    Primary care provider: Hesham Mendez         Chief Complaint:   Heart failure - evaluate for transplant vs mechanical support     History of Present Illness: Nicolette Adan is a 68 year old female with history of dilated cardiomyopathy.  She has had 3 prior chest surgeries.  Per patient she had left thoracotomy and likely closed mitral valve commisurotomy.  She had sternotomy and mechanical mitral valve in 1989.  She had redo sternotomy and aortic valve replacement in 1998 at [facility].    She has had progressive decline in her exertional capacity over the last 12-18 months.  She was admitted with inotrope dependent heart failure.  We are asked to evaluate for possible heart transplant vs mechanical support.    She is seen in her hospital room with her  present.  She is a pleasant, bright, elderly female.  She and her  live at home currently.  Due to her health condition she is not able to perform many daily activities, or exercise.  She does still drive.           Assessment and Plan:   Nicolette Adan is a 68 year old female with dilated cardiomyopathy  1) Agree with listing for heart transplant  2) Will discuss plans for possible mechanical support should she require this  3) Please call with questions or concerns.     Thank you for the opportunity to participate in the care of this patient.           Reji Bulreson MD  Cardiothoracic Surgery  [phone]                Past Medical History:     Past Medical History:   Diagnosis Date     Anxiety      Congestive heart failure (H)      Heart murmur      History of blood transfusion                Past Surgical History:     Past Surgical History:   Procedure Laterality Date     APPENDECTOMY       CARDIAC SURGERY                 Social History:     Social History     Socioeconomic History      Marital status:      Spouse name: Not on file     Number of children: Not on file     Years of education: Not on file     Highest education level: Not on file   Social Needs     Financial resource strain: Not on file     Food insecurity - worry: Not on file     Food insecurity - inability: Not on file     Transportation needs - medical: Not on file     Transportation needs - non-medical: Not on file   Occupational History     Not on file   Tobacco Use     Smoking status: Never Smoker     Smokeless tobacco: Never Used   Substance and Sexual Activity     Alcohol use: Yes     Alcohol/week: 0.6 oz     Types: 1 Glasses of wine per week     Comment: occational     Drug use: No     Sexual activity: Not on file   Other Topics Concern     Not on file   Social History Narrative     Not on file               Family History:   No family history on file.           Allergies:    No Known Allergies            Medications:     Current Facility-Administered Medications   Medication     acetaminophen (TYLENOL) Suppository 650 mg     acetaminophen (TYLENOL) tablet 650 mg     alum & mag hydroxide-simethicone (MYLANTA ES/MAALOX  ES) suspension 30 mL     amiodarone (PACERONE/CODARONE) tablet 400 mg     aspirin (ASA) chewable tablet 81 mg     ceFAZolin (ANCEF) 1 g vial to attach to  ml bag for ADULT or 50 ml bag for PEDS     dextrose 50 % injection 25-50 mL     docusate sodium (COLACE) capsule 100 mg     fluticasone (FLONASE) 50 MCG/ACT spray 1 spray     furosemide (LASIX) tablet 40 mg     guaiFENesin (MUCINEX) 12 hr tablet 600 mg     heparin  drip 25,000 units in 0.45% NaCl 250 mL (see additional administration details for dose)     heparin bolus from infusion pump     heparin lock flush 10 UNIT/ML injection 2-5 mL     lidocaine (LMX4) cream     lidocaine (LMX4) cream     lidocaine 1 % 1 mL     magnesium sulfate 2 g in water intermittent infusion     magnesium sulfate 4 g in 100 mL sterile water (premade)     medication  instruction     melatonin tablet 5 mg     naloxone (NARCAN) injection 0.1-0.4 mg     nitroGLYcerin (NITROSTAT) sublingual tablet 0.4 mg     Patient is already receiving anticoagulation with heparin, enoxaparin (LOVENOX), warfarin (COUMADIN)  or other anticoagulant medication     polyethylene glycol (MIRALAX/GLYCOLAX) Packet 17 g     potassium chloride (KLOR-CON) Packet 20-40 mEq     potassium chloride 10 mEq in 100 mL intermittent infusion with 10 mg lidocaine     potassium chloride 10 mEq in 100 mL sterile water intermittent infusion (premix)     potassium chloride 20 mEq in 50 mL intermittent infusion     potassium chloride ER (K-DUR/KLOR-CON M) CR tablet 20-40 mEq     ranolazine (RANEXA) 12 hr tablet 500 mg     sertraline (ZOLOFT) tablet 50 mg     sodium chloride (PF) 0.9% PF flush 3 mL     sodium chloride (PF) 0.9% PF flush 3 mL     sodium phosphate 10 mmol in D5W intermittent infusion     sodium phosphate 15 mmol in D5W intermittent infusion     sodium phosphate 20 mmol in D5W intermittent infusion     sodium phosphate 25 mmol in D5W intermittent infusion     Warfarin Therapy Reminder (Check START DATE - warfarin may be starting in the FUTURE)     Facility-Administered Medications Ordered in Other Encounters   Medication     bupivacaine 0.25% PF (perineural)             Review of Systems:   A 10 point ROS was performed and is negative other than HPI.          Physical Exam:      Temp:  [97.2  F (36.2  C)-98.2  F (36.8  C)] 97.2  F (36.2  C)  Heart Rate:  [] 70  Resp:  [10-42] 16  BP: ()/(60-87) 102/64  SpO2:  [93 %-97 %] 94 %    Gen: NAD, resting comfortably in bed  Lungs: CTAB, non-labored breathing  CV: regular rhythm, normal rate  Abd: soft, not tender, not distended  Ext: motor, pulses, sensation intact  Neuro: AOx3    Labs:  ABG No lab results found in last 7 days.  CBC  Recent Labs   Lab 01/07/19  0337 01/06/19  0314 01/05/19  0346 01/04/19  0415   WBC 9.0 8.9 10.5 9.6   HGB 8.8* 8.9* 8.6*  9.4*    248 219 218     BMP  Recent Labs   Lab 01/07/19  0337 01/06/19  2121 01/06/19  1534 01/06/19  0314 01/05/19  1655     --  133 134 133   POTASSIUM 4.5 4.1 3.6 4.0 4.2   CHLORIDE 101  --  96 97 97   CO2 27  --  29 27 28   BUN 29  --  28 29 29   CR 1.39*  --  1.40* 1.38* 1.31*   *  --  168* 101* 102*     LFT  Recent Labs   Lab 01/07/19  0337 01/06/19  0314 01/05/19  0346 01/04/19  0415   AST  --   --  18  --    ALT  --   --  36  --    ALKPHOS  --   --  50  --    BILITOTAL  --   --  0.7  --    ALBUMIN  --   --  3.2*  --    INR 1.96* 2.06* 1.88* 1.52*     PancreasNo lab results found in last 7 days.    Imaging:  Transthoracic echocardiogram (12/31/18):  Interpretation Summary  Severely (EF 10-20%) reduced left ventricular function is present.Severe left  ventricular dilation is present.  Global right ventricular function is moderately to severely reduced.Mild to  moderate right ventricular dilation is present.  s/p 29mm St. Reece MVR(1989)  s/p 23 mm St. Reece AVR (1998). There is an increased gradient across AV Mean  gradient of 23mmHg, peak gradient of 43, Peak velocity of 3.2m/s  _____________________________________________________________________________  __        Left Ventricle  Left ventricular wall thickness is normal. Severe left ventricular dilation is  present. Severely (EF 10-20%) reduced left ventricular function is present.  Diastolic function not assessed due to presence of prosthetic mitral valve.  Severe diffuse hypokinesis is present.     Right Ventricle  Mild to moderate right ventricular dilation is present. Global right  ventricular function is moderately to severely reduced. A pacemaker lead is  noted in the right ventricle.     Atria  The atria cannot be assessed.     Mitral Valve  s/p 29mm St. Reece MVR(1989). The mean mitral valve gradient is 3.8 mmHg.  Prosthetic valve is well seated, no paravalvular leak and has normal  gradients.        Aortic Valve  s/p 23 mm St.  Reece AVR (1998). increased gradient across AV Mean gradient of  23mmHg, peak gradient of 43, Peak velocity of 3.2m/s.     Tricuspid Valve  The tricuspid valve is normal. Mild tricuspid insufficiency is present. Right  ventricular systolic pressure is 25mmHg above the right atrial pressure.     Pulmonic Valve  The pulmonic valve is normal. Trace pulmonic insufficiency is present.     Vessels  Visualized poriton of the aorta are normal in size (proximal ascending aorta  measures 3cm). The pulmonary artery is normal. Unable to assess mean RA  pressure given the patient is on a ventilator. IVC is dilated.     Pericardium  No pericardial effusion is present.        Compared to Previous Study  Previous study not available for comparison.     Attestation  I have personally viewed the imaging and agree with the interpretation and  report as documented by the fellow, Amairani Barajas, and/or edited by me.  _____________________________________________________________________________  __     MMode/2D Measurements & Calculations  IVSd: 0.84 cm  LVIDd: 6.9 cm  LVIDs: 5.5 cm  LVPWd: 0.82 cm  FS: 20.8 %  LV mass(C)d: 251.8 grams  LV mass(C)dI: 175.3 grams/m2  asc Aorta Diam: 2.8 cm  LVOT diam: 1.9 cm  LVOT area: 2.7 cm2     EF(MOD-bp): 8.5 %  RWT: 0.24        Doppler Measurements & Calculations  MV E max colin: 166.6 cm/sec  MV A max colin: 0.18 cm/sec  MV E/A: 905.8  MV max P.4 mmHg  MV mean PG: 3.8 mmHg  MV V2 VTI: 32.4 cm  MV dec slope: 565.9 cm/sec2  MV dec time: 0.29 sec  Ao V2 max: 308.7 cm/sec  Ao max P.3 mmHg  Ao V2 mean: 216.0 cm/sec  Ao mean P.7 mmHg  Ao V2 VTI: 62.0 cm     TV max P.3 mmHg  PA acc time: 0.10 sec  TR max colin: 251.2 cm/sec  TR max P.3 mmHg  E/E' av.5  Lateral E/e': 18.3  Medial E/e': 34.7     _____________________________________________________________________________    Coronary angiogram - [facility] (18):  Findings:   Left Main: Normal.   Left Anterior Descending:  Transapical.  Normal.   Circumflex: Nondominant.  Normal.   Right Coronary Artery: Dominant.  Normal.    Right heart cath - [facility] (12/24/18):  Hemodynamics:   Aorta: 103/57; mean 74 mmHg   Mean right atrium: 17 mmHg   Right ventricle: 49/6 mmHg   Pulmonary artery: 47/22; mean 31 mmHg   Pulmonary Wedge pressure: 19 mmHg     Sadaf cardiac output: 2.75 L/min   Sadaf cardiac index: 1.9 L/min/m      Systemic vascular resistance: 1658 dsc^-5   Total pulmonary resistance: 901 dsc^-5   Pulmonary vascular resistance: 349 dsc^-5     Saturations:    Right atrium: 61%    Pulmonary artery: 60%    Aorta: 99%    Wedge: 96%    CT Chest (12/30/18):  Findings:  Chest:  Right internal jugular [place] catheter tip terminates in the right  main pulmonary artery. Cardiac device in place with leads in the right  atrium and ventricle. The heart is massively enlarged. Mitral and  aortic valve prostheses. The visualized thyroid is unremarkable. No  mediastinal lymphadenopathy. Small to moderate right pleural effusion.  No focal consolidation or pneumothorax.     Abdomen/pelvis:  Dilation of the hepatic veins, otherwise unremarkable noncontrast  appearance of the liver, kidneys, adrenal glands, and pancreas.  Innumerable calcified gallstones without gallbladder wall thickening  or pericholecystic fluid. Splenic capsular calcification and  retraction likely related to prior infarct. There are no dilated loops  of large or small bowel. There is a large amount of gas in the rectal  vault.The abdominal aorta is within normal limits for caliber. There  is no intra-abdominal or pelvic free air, fluid, or lymphadenopathy.  The bladder is predominantly decompressed with a Dukes catheter in  place. No pelvic masses.     Bones:  No acute osseus abnormality or suspicious bony lesion.                                                                      Impression:   1. Support devices above.  2. Massive cardiomegaly with aortic and mitral valve  prostheses.  3. Dilation of the hepatic veins likely related to increased right  heart pressures.  4. Cholelithiasis without cholecystitis.     I have personally reviewed the examination and initial interpretation  and I agree with the findings.     MAURISIO LARIOS MD    Bilateral Carotid Arterial Duplex - pending    Pulmonary function testing - read pending

## 2019-01-07 NOTE — PROGRESS NOTES
Care Coordinator Progress Note    Admission Date/Time:  12/30/2018  Attending MD:  Misty Lopez, *    Data  Chart reviewed, discussed with interdisciplinary team.   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 Reece, 1989), cardiac arrest 2/2 VF in 2008 s/p dual chamber ICD with recurrent ICD shocks, atrial fibrillation. She presented to [facility] for epigastric discomfort and was found to have recurrent VT with ICD shocks. Transferred to [facility] with refractory VT and for consideration for advanced therapies.       Concerns with insurance coverage for discharge needs: None.  Current Living Situation: Patient lives with spouse.  Support System: Supportive and Involved  Transportation at Discharge: Family or friend will provide  Barriers to Discharge: Pt is not medically stable for discharge.    Assessment  Pt is randy evaluated for heart transplant vs mechanical support.  Cardiothoracic surgery have been consulted for evaluation of transplant vs mechanical support.  RNCC will cont to follow plan of care.     Plan  Anticipated Discharge Date:  TBD.  Anticipated Discharge Plan:   TBD.  RNCC will cont to follow plan of care.      Kim Rivera RN, PHN, BSN  4A and 4E/ ICU  Care Coordinator  Phone: [phone]  Pager: [phone]

## 2019-01-07 NOTE — PLAN OF CARE
Pt is alert and oriented. Vital signs stable, O2 saturations WDL--pt was placed on 1L NC this morning for desaturations while asleep. Heart rhythm WDL paced at 70-90 bpm with occsional PVCs. Lytes replaced, urine output WDL. Pt had 2 soft stools, please hold off on stool softeners for now.

## 2019-01-07 NOTE — PLAN OF CARE
LVAD and heart transplant work-up underway.  100% AV paced with occ ectopy.  Hep gtt continues - 10A therapeutic this morning.  Voiding spontaneously via commode - 2 loose stools today.  Up with SBA - denies pre-syncopal symptoms with activity today.  Bruising of face (bridge of nose and cheeks) unchanged.     Report called to 6B ( Satish RN).  All belongings and medications gathered and will be sent up with patient.  The patient will ambulate upstairs with an RN on monitor.

## 2019-01-07 NOTE — PROGRESS NOTES
Transfer  Transferred from: 4E  Via: walk  Reason for transfer:Pt appropriate for 6B- improved patient condition  Family: Aware of transfer  Belongings: Received with pt  Chart: Received with pt  Medications: Meds received from old unit with pt  2 RN Skin Assessment Completed By: SG and NL  Report received from: 4E RN  Pt status: Full - VSS on RA - A&Ox4 - Heparin gtt at 700units/hr

## 2019-01-07 NOTE — PROGRESS NOTES
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 Transferred to [facility] with refractory VT and for consideration for advanced therapies.    IABP was placed due to hypotension despite vasopressin.  She was extubated 12/31.  Morning of 1/2, patient had 60 second run of VT.  EKG not able to capture run, but ectopy appeared to be RBBB in nature, inferior axis, potentially LV apical or LVOT focus.  Due to recurrent NSVT and VT and hemodynamics suggesting cold and dry physiology, we started evaluation for potential LVAD or transplant (O blood type).     # VT storm  # Hx of cardiac arrest 2/2 VF (2008), s/p dual chamber ICD with recurrent shocks  # Prolonged QT  Multiple recent shocks from ICD including few days prior had syncopal episode and was shocked. Prolonged QT seen at outside hospital - PTA meds stopped at OSH: prozac, lexapro, digoxin. Poor candidate for VT ablation due to mechanical aortic and mitral valves. Angiogram done on 12/24/18 with normal coronary arteries. Transferred on amiodarone drip, lidocaine gtt and propranolol TID.  Lidocaine stopped due to elevated level.  IABP placed on admission here 12/30, removed 1/2.   - attempted to pace at higher atrial rate, however MAP did not improve and her VT is quite slow    - amiodarone 400mg daily, she has had adequate load based on amount given in [place]   - beta blocker held here due to low output   - ranolazine 500mg BID   - paravertebral block as temporizing measure, performed by anesthesiology 1/3   -  transplant/LVAD evaluation   - Monitor lytes, on replacement protocol     # HFrEF exacerbation, EF 15% 12/21/18  # Non ischemic dilated cardiomyopathy  - diuresis: increasing scheduled furosemide to 40 mg daily  - GDMT: on hold in the setting of shock  - vasodilators: holding in the setting of fixed obstruction  - Mechanical circulatory support: Currently undergoing evaluation for transplant and LVAD  - PICC MvO2 calculating CI 2.3 today    # Rheumatic heart disease   # Mitral valve replacement, 29mm St. Reece 1989  # Aortic valve replacement, 23 mm St. Reece 1998, echo here with peak velocity 3.2 m/s, at least moderate AS  # Atrial fibrillation, CHADSVASc ~ 4, on warfarin goal 2.5-3.5  Paced on tele.    - Heparin gtt, giving warfarin for now, though may need to hold if potential procedures arise.   - Monitor INR     # CKD III - NANCY resolved, Cr 1.1, eGFR 50, component of low output and cardiorenal.    UTI - started on cefazolin, coag negative staph, abx from 1/5-1/10    PAD - ISABELLA greater than 1.3 each leg.     Chronic issues:   - Depression: Continue sertraline   - DM: Monitor sugars, hypoglycemia protocol   - Non-severe malnutrition in the context of acute on chronic illness - seeing nutrition here   - Anemia Secondary to CKD and chronic disease. No sign of active bleed     Floor Care  Fluids: none  Food: cardiac diet  Electrolyte repletion: prn  Analgesia: none  Sedation: none  DVT ppx: heparin  Stress Ulcer ppx: none  Glycemic Control: none  Catheters/tubes: swan and loaiza out  Lines: pIV, PICC  Therapies: PT/OT  Consults: LVAD/transplant workup  Code Status: Full  Family: updated today in person  Dispo - transfer out of ICU     Patient was discussed with Dr. Lopez.      Jean Marie Duke MD PGY5  Cardiology Fellow  [phone]       Subjective:     No acute overnight events.  No episodes of ventricular tachycardia.          Medications:       amiodarone  400 mg Oral Daily     aspirin  81 mg Oral Daily      ceFAZolin  1 g Intravenous Q12H     docusate sodium  100 mg Oral BID     fluticasone  1 spray Both Nostrils Daily     furosemide  40 mg Oral Daily     guaiFENesin  600 mg Oral BID     ranolazine  500 mg Oral BID     sertraline  50 mg Oral Daily     sodium chloride (PF)  3 mL Intracatheter Q8H       HEParin 700 Units/hr (01/07/19 0700)     - MEDICATION INSTRUCTIONS -       - MEDICATION INSTRUCTIONS -       Warfarin Therapy Reminder              Objective:          Physical Exam:   Temp:  [97.2  F (36.2  C)-98.2  F (36.8  C)] 97.2  F (36.2  C)  Heart Rate:  [] 70  Resp:  [10-42] 16  BP: ()/(60-87) 102/64  SpO2:  [93 %-97 %] 94 %  I/O last 3 completed shifts:  In: 1522.03 [P.O.:1160; I.V.:362.03]  Out: 2150 [Urine:2150]    Vitals:    01/03/19 0400 01/04/19 0400 01/05/19 0400 01/06/19 0700   Weight: 49.2 kg (108 lb 7.5 oz) 46.4 kg (102 lb 4.7 oz) 45.9 kg (101 lb 3.1 oz) 44.4 kg (97 lb 14.2 oz)    01/07/19 0551   Weight: 44.6 kg (98 lb 4.5 oz)       GEN: alert and oriented  HEENT: facial bruising  CV:  Regular rate and rhythm, mechanical murmurs with spared S2  LUNGS:  on nasal canula, inspiratory rales at bases  GROIN: no hematoma  ABD:  Active bowel sounds, soft, non-tender/non-distended.  No rebound/guarding/rigidity.  EXT:  No edema or cyanosis.  Hands/feet warm to touch with good signs of peripheral perfusion.          Data:   BMP  Recent Labs   Lab 01/07/19  0337 01/06/19  2121 01/06/19  1534 01/06/19  0314 01/05/19  1655     --  133 134 133   POTASSIUM 4.5 4.1 3.6 4.0 4.2   CHLORIDE 101  --  96 97 97   KARINA 8.6  --  8.8 8.4* 8.6   CO2 27  --  29 27 28   BUN 29  --  28 29 29   CR 1.39*  --  1.40* 1.38* 1.31*   *  --  168* 101* 102*     LFTs  Recent Labs   Lab 01/05/19  0346   ALKPHOS 50   AST 18   ALT 36   BILITOTAL 0.7   PROTTOTAL 6.8   ALBUMIN 3.2*      CBC  Recent Labs   Lab 01/07/19  0337 01/06/19  0314 01/05/19  0346 01/04/19  0415   WBC 9.0 8.9 10.5 9.6   RBC 3.03* 3.06* 2.97* 3.29*    HGB 8.8* 8.9* 8.6* 9.4*   HCT 28.4* 28.7* 27.8* 30.6*   MCV 94 94 94 93   MCH 29.0 29.1 29.0 28.6   MCHC 31.0* 31.0* 30.9* 30.7*   RDW 15.7* 15.3* 15.0 14.9    248 219 218     INR  Recent Labs   Lab 01/07/19  0337 01/06/19  0314 01/05/19  0346 01/04/19  0415   INR 1.96* 2.06* 1.88* 1.52*     Echo 12/30  Left Ventricle  Left ventricular wall thickness is normal. Severe left ventricular dilation is  present. Severely (EF 10-20%) reduced left ventricular function is present.  Diastolic function not assessed due to presence of prosthetic mitral valve.  Severe diffuse hypokinesis is present.     Right Ventricle  Mild to moderate right ventricular dilation is present. Global right  ventricular function is moderately to severely reduced. A pacemaker lead is  noted in the right ventricle.     Atria  The atria cannot be assessed.     Mitral Valve  s/p 29mm St. Reece MVR(1989). The mean mitral valve gradient is 3.8 mmHg.  Prosthetic valve is well seated, no paravalvular leak and has normal  gradients.        Aortic Valve  s/p 23 mm St. Reece AVR (1998). increased gradient across AV Mean gradient of  23mmHg, peak gradient of 43, Peak velocity of 3.2m/s.     Tricuspid Valve  The tricuspid valve is normal. Mild tricuspid insufficiency is present. Right  ventricular systolic pressure is 25mmHg above the right atrial pressure.     Pulmonic Valve  The pulmonic valve is normal. Trace pulmonic insufficiency is present.     Vessels  Visualized poriton of the aorta are normal in size (proximal ascending aorta  measures 3cm). The pulmonary artery is normal. Unable to assess mean RA  pressure given the patient is on a ventilator. IVC is dilated.     Pericardium  No pericardial effusion is present.

## 2019-01-08 NOTE — PLAN OF CARE
Neuro: A/Ox4.  Cardiac: 100% AV paced with HR 70's. VSS. Afebrile.   Respiratory: O2 sats stable on RA.   GI/: Adequate UOP. Small BM.   Diet/appetite: 2 g Na diet.   Activity:  SBA.  Pain: At acceptable level on current regimen.   Skin: Intact, no new deficits noted.  LDA's: R PICC. Heparin gtt at 700 units/hr.     Plan: Continue with POC. Notify primary team with changes.

## 2019-01-08 NOTE — PLAN OF CARE
Neuro: A&Ox4.   Cardiac: 100% AV Paced. VSS.   Respiratory: Sating 98% on RA.  GI/: Adequate urine output. BM X1  Diet/appetite: Tolerating diet. Eating well.  Activity:  Assist of SB - up to chair and in halls.  Pain: At acceptable level on current regimen.   Skin: See documentation.  LDA's: Heparin gtt at 700units/hr    Plan: Continue with POC. Notify primary team with changes.

## 2019-01-08 NOTE — PROGRESS NOTES
Met with patient and  to present the NuPulse ambulatory intra-aortic balloon pump as possible treatment option.     Discussed patient and caregiver responsibilities before and after implant.  Clarified caregiver need for daily dressing change and patient support, especially early on. Pt's spouse, Jon, indicated he could offer 24/7 support for as long as needed, as he does not work.     Discussed basic overview of NuPulse equipment and plugged it in so they could see and hear it work. Also discussed frequency of follow-up clinic appointments and the likely need to stay locally for a few weeks after discharge until she is stable. Discussed the option of possibly staying in the hospital to await heart transplant, though pt indicated she was definitely not interested in this option at the time.     Provided the NuPulse educational brochure and writer's contact information for questions. Informed pt and spouse that this product is still in clinical trial and if it is something we pursue, they would also meet with a  to discuss the trial.     Pt and  indicated they would like to talk with each other more about VAD, NuPulse, and transplant. They will also discuss risk/benefit options with Cardiology team.

## 2019-01-08 NOTE — CONSULTS
Consult Date:  01/07/2019      NEUROPSYCHOLOGICAL EVALUATION      REQUESTING PHYSICIAN:  Misty Lopez MD      RELEVANT HISTORY AND REASON FOR REFERRAL:  Nicolette Adan is a 68-year-old  white woman with longstanding nonischemic dilated cardiomyopathy, now with an EF in the 15% range.  There is a history of rheumatic heart disease requiring mitral valve replacement surgery.  A dual-chamber ICD was placed in 2008, following an out-of-hospital cardiac arrest.  Now with worsening heart failure, she is hospitalized for cardiac tune-up and is being considered for advanced treatments including LVAD and heart transplant.  Zoloft was started during this admission to address mild depression and anxiety; before that she was on another mood stabilizer.  Neuropsychological evaluation is requested by the attending cardiologist, Dr. Misty Lopez, on behalf of the heart transplant team, to assess mental health, lifestyle issues, and cognition, as it pertains to her suitability for these advanced treatments.      The oldest of two children, she was born in [place] and grew up in that area, reared by her parents.  Her father worked in a paper mill, but after a serious work accident that injured his back and feet, he left that employment and subsequently operated a small store/game room.  Her mother helped with that business, having previously worked as a .  Ms. Adan finished high school without learning difficulties plus some college.  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  She lives in [place] with her spouse of 48 years, a retired .  They have a son in [place] and a daughter in [place].      BEHAVIORAL OBSERVATIONS AND CLINICAL INTERVIEW FINDINGS:  Ms. Adan was resting in her ICU bed when I arrived, her , Jon was visiting and was included in the interview.  She presents as a frail, weary woman  with uncombed curly blondish red hair, dressed in gowns with IVs running.  There are bruises under both eyes and a small hematoma on the bridge of her nose, from a fall during this hospitalization.  Her voice was soft and a little raspy from the recent intubation.  Despite all of this, she was very pleasant and fully cooperative with all of my questions, and, despite slight latencies, was lucid and had a good command of her history.      I learned she was initially hospitalized in [place] on 12/20/18, and has been in the hospital there and here ever since.  The fall happened while sitting on the toilet.  She suddenly felt ill and had a rush sensation, then got hot and sweaty. The last thing she remembers.  Apparently she fell forward and hit her face on something.  Her next memory is being helped up by several people.  Apparently this was a syncopal episode brought on by very low blood pressure.  She's not had subsequent concussive sequelae, and seems to remember everything surrounding the event (no retrograde or anterograde amnesia).      They are in the early stages of learning about LVAD and heart transplant.  Naturally, she'd prefer to avoid two major chest surgeries and proceed straight to heart transplant, know this might not be possible.  They have not yet had the LVAD show and tell, and have yet to learn all that would be involved.  But she's prepared to pursue any treatment that will extend her life, wanting to be around longer to enjoy her young grandchildren.  Her  is very supportive and wants to keep her around.  He would be her primary caretaker.      The out-of-hospital cardiac arrest occurred on 12/9/2008.  She was at home enjoying a licorice twist when she suddenly collapsed.  Her  was home and at first thought she might be choking, mindful of the potential dangers of Twizzlers.  He quickly administered CPR which he fortuitously learned at a [employer] educational session a few years  earlier, and called 911.  He estimates she was down about 45 minutes, during which ENTs took over the CPR and administering several cardiac shocks.  She was taken to [facility] and treated with a hypothermia protocol, and placed in a medically induced coma.  She woke up six days later, asking for a Pepsi.  The physicians, including a neurologist, were surprised and pleased by her rapid recovery.  At first she couldn't quite focus her eyes, but that resolved and she seemed to get back to her normal baseline, with no lingering neurologic or cognitive sequelae.  Discharge home was delayed until New Year's Arlene, while she awaited defibrillator placement.  It has fired twice, initially right after she got the device, while it is still being adjusted, the second time while on vacation, when it apparently fired appropriately.  She has not had any stroke symptoms, seizures, or central nervous system viruses or infections, and has been on blood thinners for quite some time.      She has been somewhat prone to anxiety since the cardiac arrest.  For a while after that event, she was emotional, tearing up at the drop of a hat, easily overwhelmed, though it does not appear she had true, cardinal symptoms of depression.  Her physician started her on a mood stabilizer of some sort, switched to Zoloft during this hospitalization.  Lately anxiety is minimal, though now she is understandably apprehensive over her grave medical situation.  In the past, she was perhaps prone to episodes of depression in response to a stressful job.  She thinks the last episode was a couple of years ago.  Despite these emotional struggles, she remained functional and did not require treatment.  She was never suicidal and hasn't needed psychiatric hospitalization.      Regarding habits, she has never been a tobacco or drug user.  Alcohol use is light and was never a heavy or problematic habit.  When they go out on Friday night she might have 1-2  drinks, either a glass of wine or O'Doul's (a beer-like beverage with very low alcohol content).      The cognitive testing portion of the evaluation was done later that morning.  She sat up bedside and was fully cooperative.  There were slight response latencies, and she was slightly confused on one item of an attentional task, but for the most part understood and followed directives well.  She was sufficiently alert and attentive, and the results are considered technically valid.      NEUROPSYCHOLOGICAL FINDINGS:  Select intellectual abilities were assessed with subtests from the Wechsler Adult Intelligence Scale-IV.  Visuospatial processing and constructional abilities (Block Design) were low average.  One of the items was a near miss as she rotated the figure about 45 degrees but otherwise had it correctly constructed.  Speeded graphomotor learning (Coding) was low average.  She was generally accurate but a little slow on this timed transcription task.  Word knowledge and expressive communicability (Vocabulary) was average.      Memory was intact under verbal conditions and impaired under nonverbal conditions.  Immediate memory for two story passages from the Wechsler Memory Scale-Revised was superior, with 29 of 50 story elements recalled immediately after presentation.  Retention of the stories 30 minutes later was also superior.  Word list learning (Dylan Auditory Verbal Learning Test) was average for overall learning over trials, with 13 of the 15 words learned by the last trial (high average).  Retention of the list after a brief distractor exercise was average, and recall 30 minutes later was high average.  Fourteen of the 15 words were correctly recognized when presented in multiple-choice format, with no intrusive erring.  Immediate memory for figural material (four designs from the WMS), was mildly to moderately impaired.  Essentially all of the meager information originally learned was retained 30  minutes later, and a little more was remembered when visual cues were provided.  All four figures were recognized when presented in multiple-choice format.  Her copy drawings of three Riojas-Gestalt figures were worked over and slightly inaccurate, but grossly within normal limits for her age.  All three figures were recalled immediately after presentation with some additional distortion.      Performance on a simple numerical sequencing exercise (Trail Making Test Part A), requiring sustained attention, was above average for speed and error free.  Performance on a more complex sequencing exercise (Trail Making Test Part B), requiring divided attention (alternating between number and letter sequences), was impaired for speed with three errors, some involving loss of set.  Verbal associative fluency on the Controlled Oral Word Association Test (generating words beginning with target letters) was low average with 36 countable responses produced across the three 60-second trials.  A variety of brief exercises requiring sustained attention and cognitive flexibility (Test of Sustained Attention and Tracking) were completed at a normal speed with only one error.  Single word reading, as measured by the Wide Range Achievement Test-4 was average, at the high school level.  Finger tapping speeds were low average bilaterally, with comparable speeds for both hands.  She is right-handed.      The MMPI-2-RF, a self-report personality measure, was also completed. All but two items were answered.  Validity indicators suggest candid, consistent responding, so results are considered a valid reflection of true mental health and personality.  Apart from subtle neurological symptoms, the profiles are entirely within normal limits, contraindication aberrant personality traits, emotional distress, behavioral dyscontrol, or other forms of major mental illness.     CONCLUSIONS AND RECOMMENDATIONS:  This very pleasant 68-year-old woman with  dilated cardiomyopathy has had three valve surgeries, most recently in 1998, and now has worsening heart failure.  She has been in the hospital here in [place] since 12/20/18, and is being worked up for LVAD and possible heart transplant.  There is no history of smoking or substance abuse.  She has been prone to situational depression, usually triggered by work stressors in the past, but did not require treatment.  Following an out-of-hospital cardiac arrest in 2008, she experienced transient emotional dyscontrol (tearing up easily, getting anxious) and was started on a mood stabilizer, which helped.  Those symptoms have since subsided.  She is now somewhat apprehensive about her grave situation, and during this hospitalization, was started on Zoloft.  She seemed to make a very good recovery from the cardiac arrest, and was able to return to work.  Ms Adan fell while sitting on the toilet during this evaluation, bruising her face. This was attributed to syncope brought on by hypotension.  She has a good recall of events surrounding the fall, with no detectable concussive sequelae.  The neurological history is otherwise unremarkable.  Ms. Adan lives in [place] with her spouse of 48 years.  He is also retired and would be her primary caretaker.      Testing reveals a few mild abnormalities.  She has difficulty on tasks requiring divided visual attention, being both slow and error prone.  Short and long-term retention of figural material is poor, but she performed normally on a recognition trial, suggesting she had actually retained more than she was able too quickly, freely recall.  Otherwise, short and long-term retention of story passages and a word list are actually above average if not superior.  Overall intellectual functioning is estimated to be at the low end of the average range, probably at her normal baseline.  Speeded word retrieval is low average, as are finger tapping speeds.  Reading skills are at  the twelfh grade level.  All of this suggests only very subtle brain dysfunction, probably related to worsening heart failure.  There are no strong indications of focal brain disease or a degenerative brain disease, and it appears she has made an excellent recovery from the cardiac arrest.  The MMPI-2-RF results suggest good coping skills and no mental illness.      Ms. Adan is a very reasonable candidate for either heart transplant or LVAD, from a psychosocial standpoint.  She can understand and retain medical information, and ultimately should be able to manage the LVAD or post-transplant regimens mostly on her own.  She has a good support system.     Sara Watson Psy.D.,   Diplomate in Clinical Neuropsychology/ABPP     DIAGNOSTIC IMPRESSION:  Cognitive disorder associated with heart failure and heart transplant evaluation.  This neuropsychological evaluation included 3 hours of neuropsychologist time, spent on the clinical interview, record review, test selection and interpretation, data integration and report preparation (1 hour CPT code 25169;  1 hour CPT code 93188, and 1 hour CPT code 32074); an additional 2 hours of psychometrist time were spent on  face-to-face testing and scoring (30 minutes CPT code 07238; 1.5 hours CPT code 92165).            JEAN PIERRE WALDROP             D: 2019   T: 2019   MT: ROSELINE      Name:     MARY ADAN   MRN:      [id number]        Account:       [id number]   :      1950           Consult Date:  2019      Document: [id number]       cc: Misty Lopez MD

## 2019-01-08 NOTE — PROGRESS NOTES
[facility] Cardiology Progress Note           Assessment and Plan:   Nicolette Adan is a 68 year old female with history of HFrEF 2/2 nonischemic dilated cardiomyopathy (EF 15%, 12/21/18), rheumatic heart disease s/p mechanical MV and AV replacement, atrial fibrillation, and sudden cardiac arrest ventricular fibrillation in 2008 s/p dual chamber ICD with recurrent ICD shocks,  She presented to [facility] for epigastric discomfort and was found to have recurrent VT with ICD shocks. Transferred to [facility] with refractory VT and for consideration for advanced therapies.    IABP was placed due to hypotension despite vasopressin.  She was extubated 12/31.  Morning of 1/2, patient had 60 second run of VT.  EKG not able to capture run, but ectopy appeared to be RBBB in nature, inferior axis, potentially LV apical or LVOT focus.  Due to recurrent NSVT and VT and hemodynamics suggesting cold and dry physiology, we started evaluation for potential LVAD or transplant (O blood type).     # VT storm  # Hx of cardiac arrest 2/2 VF (2008), s/p dual chamber ICD with recurrent shocks  # Prolonged QT  Multiple recent shocks from ICD including few days prior had syncopal episode and was shocked. Prolonged QT seen at outside hospital - PTA meds stopped at OSH: prozac, lexapro, digoxin. Poor candidate for VT ablation due to mechanical aortic and mitral valves. Angiogram done on 12/24/18 with normal coronary arteries. Transferred on amiodarone drip, lidocaine gtt and propranolol TID.  Lidocaine stopped due to elevated level.  IABP placed on admission here 12/30, removed 1/2.   - attempted to pace at higher atrial rate, however MAP did not improve and her VT is quite slow    - amiodarone 400mg daily, she has had adequate load based on amount given in [place], may consider reducing to 200mg daily    - beta blocker held here due to low output, with increase in BP may start low dose lopressor    - considering NuPulse  clinical trial for ambulatory IABP   - ranolazine 500mg BID   - paravertebral block performed by anesthesiology 1/3   - transplant/LVAD evaluation   - Monitor lytes, on replacement protocol     # HFrEF exacerbation, EF 15% 12/21/18  # Non ischemic dilated cardiomyopathy  - diuresis: increasing scheduled furosemide to 40 mg daily  - GDMT: on hold in the setting of shock  - vasodilators: holding in the setting of fixed obstruction (aortic stenosis)  - Mechanical circulatory support: Currently undergoing evaluation for transplant and LVAD    # Rheumatic heart disease   # Mitral valve replacement, 29mm St. Reece 1989  # Aortic valve replacement, 23 mm St. Reece 1998, echo here with peak velocity 3.2 m/s, at least moderate AS  # Atrial fibrillation, CHADSVASc ~ 4, on warfarin goal 2.5-3.5  Paced on tele.    - Heparin gtt, giving warfarin for now, though may need to hold if potential procedures arise.   - Monitor INR     # CKD III - NANCY resolved, Cr 1.1, eGFR 50, component of low output and cardiorenal.    UTI - started on cefazolin, coag negative staph, switch to oral Cipro until 1/11    PAD - ISABELLA greater than 1.3 each leg.     Chronic issues:   - Depression: Continue sertraline   - Non-severe malnutrition in the context of acute on chronic illness - seeing nutrition here   - Anemia Secondary to CKD and chronic disease. No sign of active bleed     Floor Care  Fluids: none  Food: cardiac diet  Electrolyte repletion: prn  Analgesia: none  Sedation: none  DVT ppx: heparin  Stress Ulcer ppx: none  Glycemic Control: none  Lines: pIV, PICC  Therapies: PT/OT  Consults: LVAD/transplant workup  Code Status: Full  Family: updated today in person     Patient was discussed with Dr. Greenwood.      Jean Marie Duke MD PGY5  Cardiology Fellow  [phone]       Subjective:     No acute overnight events.  Multiple runs of ventricular tachycardia.  She otherwise feels well without complaints.          Medications:       amiodarone  400 mg  Oral Daily     aspirin  81 mg Oral Daily     ceFAZolin  1 g Intravenous Q12H     fluticasone  1 spray Both Nostrils Daily     furosemide  40 mg Oral Daily     guaiFENesin  600 mg Oral BID     ranolazine  500 mg Oral BID     sertraline  50 mg Oral Daily     sodium chloride (PF)  3 mL Intracatheter Q8H       HEParin 700 Units/hr (01/08/19 0519)     - MEDICATION INSTRUCTIONS -       - MEDICATION INSTRUCTIONS -       Warfarin Therapy Reminder              Objective:          Physical Exam:   Temp:  [96.7  F (35.9  C)-98.1  F (36.7  C)] 98.1  F (36.7  C)  Heart Rate:  [] 72  Resp:  [11-26] 18  BP: ()/(58-81) 103/60  SpO2:  [94 %-97 %] 95 %  I/O last 3 completed shifts:  In: 1491.5 [P.O.:980; I.V.:511.5]  Out: 1800 [Urine:1800]    Vitals:    01/04/19 0400 01/05/19 0400 01/06/19 0700 01/07/19 0551   Weight: 46.4 kg (102 lb 4.7 oz) 45.9 kg (101 lb 3.1 oz) 44.4 kg (97 lb 14.2 oz) 44.6 kg (98 lb 4.5 oz)    01/08/19 0437   Weight: 46.5 kg (102 lb 8 oz)     GEN: alert and oriented  HEENT: facial bruising  CV:  Regular rate and rhythm, mechanical murmurs with spared S2  LUNGS:  on nasal canula, inspiratory rales at bases  GROIN: no hematoma  ABD:  Active bowel sounds, soft, non-tender/non-distended.  No rebound/guarding/rigidity.  EXT:  No edema or cyanosis.  Hands/feet warm to touch with good signs of peripheral perfusion.          Data:   BMP  Recent Labs   Lab 01/08/19  0456 01/07/19  1026 01/07/19  0337 01/06/19  2121 01/06/19  1534 01/06/19  0314     --  136  --  133 134   POTASSIUM 4.3 4.2 4.5 4.1 3.6 4.0   CHLORIDE 100  --  101  --  96 97   KARINA 8.7  --  8.6  --  8.8 8.4*   CO2 27  --  27  --  29 27   BUN 24  --  29  --  28 29   CR 1.33*  --  1.39*  --  1.40* 1.38*   *  --  107*  --  168* 101*     LFTs  Recent Labs   Lab 01/05/19 0346   ALKPHOS 50   AST 18   ALT 36   BILITOTAL 0.7   PROTTOTAL 6.8   ALBUMIN 3.2*      CBC  Recent Labs   Lab 01/08/19  0456 01/07/19  0337 01/06/19  0314 01/05/19  0346    WBC 8.7 9.0 8.9 10.5   RBC 3.10* 3.03* 3.06* 2.97*   HGB 8.9* 8.8* 8.9* 8.6*   HCT 29.5* 28.4* 28.7* 27.8*   MCV 95 94 94 94   MCH 28.7 29.0 29.1 29.0   MCHC 30.2* 31.0* 31.0* 30.9*   RDW 16.2* 15.7* 15.3* 15.0    266 248 219     INR  Recent Labs   Lab 01/07/19  0337 01/06/19  0314 01/05/19  0346 01/04/19  0415   INR 1.96* 2.06* 1.88* 1.52*     Echo 12/30  Left Ventricle  Left ventricular wall thickness is normal. Severe left ventricular dilation is  present. Severely (EF 10-20%) reduced left ventricular function is present.  Diastolic function not assessed due to presence of prosthetic mitral valve.  Severe diffuse hypokinesis is present.     Right Ventricle  Mild to moderate right ventricular dilation is present. Global right  ventricular function is moderately to severely reduced. A pacemaker lead is  noted in the right ventricle.     Atria  The atria cannot be assessed.     Mitral Valve  s/p 29mm St. Reece MVR(1989). The mean mitral valve gradient is 3.8 mmHg.  Prosthetic valve is well seated, no paravalvular leak and has normal  gradients.        Aortic Valve  s/p 23 mm St. Reece AVR (1998). increased gradient across AV Mean gradient of  23mmHg, peak gradient of 43, Peak velocity of 3.2m/s.     Tricuspid Valve  The tricuspid valve is normal. Mild tricuspid insufficiency is present. Right  ventricular systolic pressure is 25mmHg above the right atrial pressure.     Pulmonic Valve  The pulmonic valve is normal. Trace pulmonic insufficiency is present.     Vessels  Visualized poriton of the aorta are normal in size (proximal ascending aorta  measures 3cm). The pulmonary artery is normal. Unable to assess mean RA  pressure given the patient is on a ventilator. IVC is dilated.     Pericardium  No pericardial effusion is present.

## 2019-01-08 NOTE — PLAN OF CARE
OT/6B - Cancel. Pt with intermittent episodes of v-tach on this date. Not medically appropriate for OT/CR at this time. Will reschedule as appropriate.

## 2019-01-08 NOTE — PROVIDER NOTIFICATION
MD notified pt having runs of VT under 1 min each with rates low 100's. Asymptomatic. No new orders at this time.

## 2019-01-08 NOTE — PROGRESS NOTES
Care Coordinator Progress Note    Admission Date/Time:  12/30/2018  Attending MD:  Misty Lopez, *    Data  Chart reviewed, discussed with interdisciplinary team.   Patient was admitted for:    Cardiogenic shock (H)  Ventricular tachycardia (H)  VT (ventricular tachycardia) (H).    Concerns with insurance coverage for discharge needs: None identified  Current Living Situation: Patient lives with   Support System: , Jon is Primary Caregiver  Services Involved:   Transportation at Discharge:   Transportation to Medical Appointments:   Barriers to Discharge: Medical Clearance         Assessment  Pt with Nonischemic Cadriomyopathy, transferred from [facility] with refractory VT on 1/1/19. Pt was transferred out of ICU yesterday to . Pt is undergoing LVAD/Heart Transplant workup.  I have met with Pt and her , Jon to introduce myself and care coordinator role.  Pt and  currently have no outstanding questions or concerns, they are pleased with care and state Medical Teams have kept them updated.      Plan  Anticipated Discharge Date: TBD  Anticipated Discharge Plan:  TBD    Antonia Carmen RN  6B care coordinator #[phone]

## 2019-01-08 NOTE — PROGRESS NOTES
Shift: 0700 - 1930  VS: Temp: 98.3  F (36.8  C) Temp src: Oral BP: 101/59   Heart Rate: 70 Resp: 24 SpO2: 94 % O2 Device: None (Room air)    Pain: Denies pain.   Neuro: A&Ox4. Pleasant and cooperative with care. Calls appropriately.   Cardiac:   Transthoracic dual paced, HR 70bpm. ICD. Frequent intermittent V-tach, asymptomatic, dysrhythmia improved after administration of Amiodarone 400mg this AM, decreased frequency. Eval for heart transplant.  Respiratory: Lung sounds clear to diminished on RA. Tachypneic, denies SOB.   GI/Diet/Appetite: Tolerating a 3gm Na diet, with fair appetite. LBM 1/8. Denies Nausea. Bowel sounds A&A.   :  Voiding w/o difficulty.   LDA's: DL PICC to RUE, infusing TKO w/ABX therapy and Heparin at 700units/hr.   Skin: Ecchymosis to forehead, eyes, bridge of nose, r/t syncopal fall. JAN. No drainage.   Activity: SBA  Tests/Procedures:   Pertinent Labs/Lab Collection: Hep10a level 0.26 this AM, therapeutic. Recheck in AM.     Plan: Continue POC.

## 2019-01-08 NOTE — PLAN OF CARE
Transplant Evaluation:  Noted by patient's VAD coordinator, Beth, that pt is not a candidate for VAD, and therefore the evaluation was being transitioned to the Transplant Team.  After review of results collected thus far, there are a few labs and tests that didn't get done.  Orders posted for those Items.    I met Nicolette and her , Jon, this morning to introduce the evaluation process  Will follow up with OS paperwork and transplant teaching in near future.

## 2019-01-09 NOTE — PROGRESS NOTES
01/09/19 1400   Signing Clinician's Name / Credentials   Signing clinician's name / credentials Rita Samayoa, DPT   Functional Gait Assessment (FELIPE Power, JORDYN Borges, et al. (2004))   1. GAIT LEVEL SURFACE 1   2. CHANGE IN GAIT SPEED 3   3. GAIT WITH HORIZONTAL HEAD TURNS 3   4. GAIT WITH VERTICAL HEAD TURNS 2   5. GAIT AND PIVOT TURN 1   6. STEP OVER OBSTACLE 2   7. GAIT WITH NARROW BASE OF SUPPORT 1   8. GAIT WITH EYES CLOSED 1   9. AMBULATING BACKWARDS 2   10. STEPS 2   Total Functional Gait Assessment Score   TOTAL SCORE: (MAXIMUM SCORE 30) 18   Functional Gait Assessment (FGA): The FGA assesses postural stability during various walking tasks.   Gait assistive device used:none    Patient Score: 18/30  Scores of <22/30 have been correlated with predicting falls in community-dwelling older adults according to Jannet & Kiko 2010.   Scores of <18/30 have been correlated with increased risk for falls in patients with Parkinsons Disease according to Leonel Cardona, Sarabia et al 2014.  Minimal Detectable Change for patients with acute/chronic stroke = 4.2 according to Thimona & Ritschel 2009  Minimal Detectable Change for patients with vestibular disorder = 8 according to Jannet & Kiko 2010    Assessment (rationale for performing, application to patient s function & care plan): Pt at increased risk of falls with L knee buckling with stair climbing, several steps to catch herself with pivot turn and significant path deviation with vertical head turn and upward gaze.  Thus, pt to have CGA via gait belt whenever up, needs continued PT to address balance and expresses interest in trying a walker next session.  Minutes billed as physical performance test: 23

## 2019-01-09 NOTE — PLAN OF CARE
Neuro: A/Ox4.  Cardiac: 100% AV paced with HR 70's. VSS. Afebrile.   Respiratory: O2 sats stable on RA, dyspnea on exertion.  GI/: Adequate UOP. Small BM.   Diet/appetite: NPO for procedure  Activity:  SBA.  Pain: Denies.  Skin: Intact, facial bruising/scab intact. No new deficits noted.  LDA's: R PICC.      Plan: Continue with POC. Plan for right heart cath this afternoon/evening. Notify primary team with changes.

## 2019-01-09 NOTE — PROGRESS NOTES
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 Transferred to [facility] with refractory VT and for consideration for advanced therapies.    IABP was placed due to hypotension despite vasopressin.  She was extubated 12/31.  Morning of 1/2, patient had 60 second run of VT.  EKG not able to capture run, but ectopy appeared to be RBBB in nature, inferior axis, potentially LV apical or LVOT focus.  Due to recurrent NSVT and VT and hemodynamics suggesting cold and dry physiology, we started evaluation for potential LVAD or transplant (O blood type).    Due to decreased venous oxyhemoglobins (34, 24, 32%), indicating calculated cardiac index of about 1.6, we will repeat a right heart cath today with potential need for IABP.  She continues to have short minute long runs of VT, one to two times a day.     # VT storm  # Hx of cardiac arrest 2/2 VF (2008), s/p dual chamber ICD with recurrent shocks  Poor candidate for VT ablation due to mechanical aortic and mitral valves. Angiogram done on 12/24/18 with normal coronary arteries. Patient transferred from [place] intubated and sedated.  IABP placed on admission here 12/30, removed 1/2.   - amiodarone 400mg daily   - beta blocker held here due to low output   - considering NuPulse clinical trial for ambulatory IABP   - ranolazine 500mg BID   - paravertebral block performed by anesthesiology 1/3   - transplant/LVAD evaluation   - Monitor lytes, on replacement protocol     # HFrEF exacerbation, EF 15% 12/21/18  # Non ischemic dilated  cardiomyopathy secondary to valvular disease  - diuresis: furosemide to 40 mg BID  - GDMT: on hold in the setting of shock  - vasodilators: holding in the setting of fixed obstruction (aortic stenosis)  - Mechanical circulatory support: Currently undergoing evaluation for transplant and LVAD    # Rheumatic heart disease   # Mitral valve replacement, 29mm St. Reece 1989  # Aortic valve replacement, 23 mm St. Reece 1998, echo here with peak velocity 3.2 m/s, at least moderate AS  # Atrial fibrillation, CHADSVASc ~ 4, on warfarin goal 2.5-3.5  AV Paced on tele.    - holding warfarin again, may need to resume heparin infusion.   - Monitor INR     # CKD III - NANCY resolved, Cr 1.1, eGFR 50, component of low output and cardiorenal.    UTI - started on cefazolin, coag negative staph, switch to oral cefdinir until 1/11    PAD - ISABELLA greater than 1.3 each leg.     Chronic issues:   - Depression: Continue sertraline   - Non-severe malnutrition in the context of acute on chronic illness - seeing nutrition here   - Anemia Secondary to CKD and chronic disease. No sign of active bleed     Floor Care  Fluids: none  Food: cardiac diet  Electrolyte repletion: prn  Analgesia: none  Sedation: none  DVT ppx: heparin/warfarin  Stress Ulcer ppx: none  Glycemic Control: none  Lines: pIV, PICC  Therapies: PT/OT  Consults: LVAD/transplant workup  Code Status: Full  Family: updated today in person     Patient was discussed with Dr. Greenwood.      Jean Marie Duke MD PGY5  Cardiology Fellow  [phone]       Subjective:     No acute overnight events.  She otherwise feels well without complaints.          Medications:       amiodarone  400 mg Oral Daily     aspirin  81 mg Oral Daily     cefdinir  300 mg Oral Q24H     fluticasone  1 spray Both Nostrils Daily     furosemide  40 mg Oral Daily     guaiFENesin  600 mg Oral BID     ranolazine  500 mg Oral BID     sertraline  50 mg Oral Daily     sodium chloride (PF)  3 mL Intracatheter Q8H       HEParin  700 Units/hr (01/09/19 0700)     - MEDICATION INSTRUCTIONS -       - MEDICATION INSTRUCTIONS -       Warfarin Therapy Reminder              Objective:          Physical Exam:   Temp:  [97.8  F (36.6  C)-98.3  F (36.8  C)] 97.8  F (36.6  C)  Heart Rate:  [70-75] 70  Resp:  [20-24] 20  BP: (101-123)/(59-70) 123/67  SpO2:  [94 %-98 %] 95 %  I/O last 3 completed shifts:  In: 938 [P.O.:600; I.V.:338]  Out: 1000 [Urine:1000]    Vitals:    01/05/19 0400 01/06/19 0700 01/07/19 0551 01/08/19 0437   Weight: 45.9 kg (101 lb 3.1 oz) 44.4 kg (97 lb 14.2 oz) 44.6 kg (98 lb 4.5 oz) 46.5 kg (102 lb 8 oz)    01/09/19 0504   Weight: 46.7 kg (103 lb)     GEN: alert and oriented  HEENT: facial bruising  CV:  Regular rate and rhythm, mechanical murmurs with spared S2.  JVP 11 cm  LUNGS:  on nasal canula, inspiratory rales at bases  GROIN: no hematoma  ABD:  Active bowel sounds, soft, non-tender/non-distended.  No rebound/guarding/rigidity.  EXT:  No edema or cyanosis.  Hands/feet warm to touch with good signs of peripheral perfusion.          Data:   BMP  Recent Labs   Lab 01/09/19  0502 01/08/19  1728 01/08/19  0456 01/07/19  1026 01/07/19  0337    136 135  --  136   POTASSIUM 4.4 3.8 4.3 4.2 4.5   CHLORIDE 98 100 100  --  101   KARINA 8.7 8.4* 8.7  --  8.6   CO2 27 28 27  --  27   BUN 29 24 24  --  29   CR 1.31* 1.30* 1.33*  --  1.39*   * 166* 124*  --  107*     LFTs  Recent Labs   Lab 01/05/19  0346   ALKPHOS 50   AST 18   ALT 36   BILITOTAL 0.7   PROTTOTAL 6.8   ALBUMIN 3.2*      CBC  Recent Labs   Lab 01/09/19  0502 01/08/19  0456 01/07/19  0337 01/06/19  0314   WBC 9.2 8.7 9.0 8.9   RBC 3.07* 3.10* 3.03* 3.06*   HGB 8.9* 8.9* 8.8* 8.9*   HCT 29.5* 29.5* 28.4* 28.7*   MCV 96 95 94 94   MCH 29.0 28.7 29.0 29.1   MCHC 30.2* 30.2* 31.0* 31.0*   RDW 16.5* 16.2* 15.7* 15.3*    289 266 248     INR  Recent Labs   Lab 01/09/19  0502 01/08/19  1345 01/07/19  0337 01/06/19  0314   INR 2.77* 2.18* 1.96* 2.06*     Echo  12/30  Left Ventricle  Left ventricular wall thickness is normal. Severe left ventricular dilation is  present. Severely (EF 10-20%) reduced left ventricular function is present.  Diastolic function not assessed due to presence of prosthetic mitral valve.  Severe diffuse hypokinesis is present.     Right Ventricle  Mild to moderate right ventricular dilation is present. Global right  ventricular function is moderately to severely reduced. A pacemaker lead is  noted in the right ventricle.     Atria  The atria cannot be assessed.     Mitral Valve  s/p 29mm St. Reece MVR(1989). The mean mitral valve gradient is 3.8 mmHg.  Prosthetic valve is well seated, no paravalvular leak and has normal  gradients.        Aortic Valve  s/p 23 mm St. Reece AVR (1998). increased gradient across AV Mean gradient of  23mmHg, peak gradient of 43, Peak velocity of 3.2m/s.     Tricuspid Valve  The tricuspid valve is normal. Mild tricuspid insufficiency is present. Right  ventricular systolic pressure is 25mmHg above the right atrial pressure.     Pulmonic Valve  The pulmonic valve is normal. Trace pulmonic insufficiency is present.     Vessels  Visualized poriton of the aorta are normal in size (proximal ascending aorta  measures 3cm). The pulmonary artery is normal. Unable to assess mean RA  pressure given the patient is on a ventilator. IVC is dilated.     Pericardium  No pericardial effusion is present.

## 2019-01-09 NOTE — PLAN OF CARE
PT -   Discharge Planner PT   Patient plan for discharge: home with 24 hr A of family available, and possibly outpatient cardiac rehab  Current status: 18/30 on FGA, thus Pt at increased risk of falls with L knee buckling with stair climbing, several steps to catch herself with pivot turn and significant path deviation with vertical head turn and upward gaze.  Thus, pt to have CGA via gait belt whenever up, needs continued PT to address balance and expresses interest in trying a walker next session  Barriers to return to prior living situation: fall risk  Recommendations for discharge: anticipate home with 24 hr A of family available, and possibly outpatient cardiac rehab, though may also need ambulatory AD and outpatient PT for balance, pending progress with balance  Rationale for recommendations: to progress balance and decrease fall rsik  Entered by: Rita Samayoa 01/09/2019 2:18 PM

## 2019-01-09 NOTE — PROGRESS NOTES
CLINICAL NUTRITION SERVICES - REASSESSMENT NOTE     Nutrition Prescription    RECOMMENDATIONS FOR MDs/PROVIDERS TO ORDER:  Continue to encourage PO intakes    Malnutrition Status:    Severe malnutrition in the context of acute on chronic illness    Recommendations already ordered by Registered Dietitian (RD):  Cafeteria passes    Future/Additional Recommendations:  If PO intakes decline, or do not continue to improve, recommend calorie counts      EVALUATION OF THE PROGRESS TOWARD GOALS   Diet: NPO   Intake: PO intakes variable. Pt consuming % of meals. Seem as though PO intakes have been improving over the past 1-2 days.    Per patient, reports that her appetite has been improving and she was currently very hungry but NPO for procedure. She reports that she does not really like the hospital food and that is inhibiting some PO intakes.     NEW FINDINGS   Weight: 5% wt loss in the past ~2 weeks.   GI: BM+ noted -4+/day    MALNUTRITION  % Intake: < 75% for > 7 days (non-severe)  % Weight Loss: > 2% in 1 week (severe)  Subcutaneous Fat Loss: Facial region, Upper arm, Lower arm and Thoracic/intercostal: mild  Muscle Loss: Temporal, Facial & jaw region, Thoracic region (clavicle, acromium bone, deltoid, trapezius, pectoral), Upper arm (bicep, tricep), Lower arm  (forearm) and Dorsal hand: moderate - no change  Fluid Accumulation/Edema: None noted  Malnutrition Diagnosis: Severe malnutrition in the context of acute on chronic illness    Previous Goals   Patient to consume % of nutritionally adequate meal trays TID, or the equivalent with supplements/snacks.  Evaluation: Not met    Previous Nutrition Diagnosis  Inadequate oral intake related to reduced appetite at times, dietary restrictions as evidenced by eating 2 meals per day PTA with reports of reduced appetite/intake, avoiding high K+ foods, reports of eating lower protein foods  Evaluation: Improving    CURRENT NUTRITION DIAGNOSIS  Predicted inadequate  nutrient intake kcals/pro related to dislike/fatigue of menu    INTERVENTIONS  Implementation  Collaboration with other providers- 6B rounds  Medical food supplement therapy  Cafeteria Passes    Goals  Patient to consume % of nutritionally adequate meal trays TID, or the equivalent with supplements/snacks.    Monitoring/Evaluation  Progress toward goals will be monitored and evaluated per protocol.      Charmaine Elise RD, MS, LD  6B- Pager: [phone]

## 2019-01-09 NOTE — PLAN OF CARE
Neuro: A/Ox4.  Cardiac: 100% AV paced with HR 70's. VSS. Afebrile.   Respiratory: O2 sats stable on RA.   GI/: Adequate UOP. Small BM.   Diet/appetite: 3 g Na diet.   Activity:  SBA.  Pain: At acceptable level on current regimen.   Skin: Intact, no new deficits noted.  LDA's: R PICC. Heparin gtt at 700 units/hr.      Plan: Continue with POC. Notify primary team with changes.

## 2019-01-09 NOTE — PROCEDURES
[facility]  CARDIOLOGY   Right Heart Catheterization   January 9, 2019    Attending Cardiology Staff: Malina Greenwood MD  Cardiology Fellow: Claus Galvan MD     History: 68 year-old woman with NICM and VT storm, here for hemodynamic evaluation with right heart catheterization on an urgent basis.     Procedure: Right Heart Cathterization  Access: 7 Fr sheath in RIJ obtained without complications with micropuncture access and US and fluoroscopic guidance   Findings   RA: 20/25/20  RV 75/20  PA 75/22/45  PCWP 34  Sadaf CO 2.0 (1.4)   PVR 5.5  PaSat 40.0 Hb 8.9  MAP 93  SVR 2120    Conclusions:  1.significantly elevated right and left sided filling pressures  2. Moderately elevated pulmonary artery pressures  3. Severely reduced cardiac output     Recommendations:  1. IABP insertion     Attending Cardiology Faculty: Ming Larsen MD  Interventional Cardiology Fellow: Julian Nunes MD    Procedure: Informed consent obtained. Patient prepped and draped in sterile fashion. Local anesthesia provided with 1% lidocaine. Under ultrasound guidance and use of micropuncture, RFA access obtained and 40cc IABP was advanced to the descending aorta, proximal end above the level of the ajay just prior to aortic arch. Correct placement of balloon pump confirmed under fluoroscopy. Augmentation set at 1:1.     Assessment:   1. Successful placement of 40cc IABP at 1:1 augmentation      Malina Greenwood MD  Advanced Heart Failure Cardiologist

## 2019-01-09 NOTE — PROGRESS NOTES
Transplant Social Work Services Progress Note      Collaborated with: Cards 2, Nicolette, & Jon ()    Data: SW was asked to see Nicolette as she was reportedly overwhelmed with information she received yesterday about balloon pump & potentially staying in the hospital until transplant.   Intervention: Supportive visit. Offered for Nicolette and Jon to attend group &/or send volunteers to their room.   Assessment: Nicolette appears to be appropriately nervous about the change in her health. She was hoping to go home over the (last) weekend. Discussed choices and lack of options. She would like visitors if she's not able to go to group tomorrow. Jon spoke with SW in the [facility]. He states that they'll do what needs to be done, but this is a scary time. They have good support from their daughter who lives close to the hospital.   Education provided by RONAK: support, support group, volunteer visitors.   Plan:    Discharge Plans in Progress: pending medical plan.     Barriers to d/c plan: pending medical stability    Follow up Plan: RONAK will continue to follow for support.     Pager [phone]

## 2019-01-10 NOTE — PROGRESS NOTES
Care Coordinator Progress Note    Admission Date/Time:  12/30/2018  Attending MD:  Misty Lopez, *    Data  Chart reviewed, discussed with interdisciplinary team.   Patient was admitted for:    Cardiogenic shock (H)  Ventricular tachycardia (H)  VT (ventricular tachycardia) (H).    Assessment  Pt transferred back to ICU yesterday, 1/9 after IABP placed.  Visited pt to introduce self and for support.  Pt spouse was at the transplant support group at time of my visit. Pt stated the team have been updating her well about the plan of care.  Pt is from [place] and spouse is staying locally with their dtr.      Plan  Anticipated Discharge Date:  TBD.  Anticipated Discharge Plan:   TBD.  RNCC will cont to follow plan of care.      Kim Rivera RN, PHN, BSN  4A and 4E/ ICU  Care Coordinator  Phone: [phone]  Pager: [phone]

## 2019-01-10 NOTE — PROGRESS NOTES
[facility] Cardiology Progress Note           Assessment and Plan:   Nicolette Adan is a 68 year old female with history of HFrEF 2/2 nonischemic dilated cardiomyopathy (EF 15%, 12/21/18), rheumatic heart disease s/p mechanical MV and AV replacement, atrial fibrillation, and sudden cardiac arrest ventricular fibrillation in 2008 s/p dual chamber ICD with recurrent ICD shocks,  She presented to [facility] for epigastric discomfort and was found to have recurrent VT with ICD shocks. Transferred to [facility] with refractory VT and for consideration for advanced therapies.    IABP was placed due to hypotension despite vasopressin.  She was extubated 12/31.  Morning of 1/2, patient had 60 second run of VT.  EKG not able to capture run, but ectopy appeared to be RBBB in nature, inferior axis, potentially LV apical or LVOT focus.  Due to recurrent NSVT and VT and hemodynamics suggesting cold and dry physiology, we started evaluation for potential LVAD or transplant (O blood type).    Due to decreased venous oxyhemoglobins (34, 24, 32%), indicating calculated cardiac index of about 1.6, she went for repeat RHC, showing elevated filling pressures and CI of 1.4, thus femoral IABP was placed.  She continues to have short minute long runs of VT, one to two times a day.  We are considering Nupulse for her (ambulatory IABP).    # Cardiogenic Shock  - 1/9 5:30 pm RHC showing PCWP 34, Cardiac Index 1.4  - 1/9 9:00 pm Blood pressure 81/47, post IABP insertion  - recent PVR 3.5 today based on Mean PA 29, PCWP 14, CO 4.3  - continue IABP and diuresis as needed     # VT   # Hx of cardiac arrest 2/2 VF (2008), s/p dual chamber ICD with recurrent shocks  Poor candidate for VT ablation due to mechanical aortic and mitral valves. Angiogram done on 12/24/18 with normal coronary arteries. Patient transferred from [place] intubated and sedated.  IABP placed on admission here 12/30, removed 1/2.   - amiodarone 400mg daily   -  beta blocker held here due to low output   - considering NuPulse clinical trial for ambulatory IABP   - ranolazine 500mg BID   - paravertebral block performed by anesthesiology 1/3   - transplant/LVAD evaluation   - Monitor lytes, on replacement protocol     # HFrEF exacerbation, EF 15% 12/21/18  # Non ischemic dilated cardiomyopathy secondary to valvular disease  - diuresis: furosemide to 40 mg BID  - GDMT: on hold in the setting of shock  - vasodilators: holding in the setting of fixed obstruction (aortic stenosis)  - Mechanical circulatory support: Currently undergoing evaluation for transplant and LVAD    # Rheumatic heart disease   # Mitral valve replacement, 29mm St. Reece 1989  # Aortic valve replacement, 23 mm St. Reece 1998, echo here with peak velocity 3.2 m/s, at least moderate AS  # Atrial fibrillation, CHADSVASc ~ 4, on warfarin goal 2.5-3.5  AV Paced on tele.    - holding warfarin again, may need to resume heparin infusion.   - Monitor INR     # CKD III - NANCY resolved, Cr 1.1, eGFR 50, component of low output and cardiorenal.    UTI - started on cefazolin, coag negative staph, switch to oral cefdinir until 1/11    PAD - ISABELLA greater than 1.3 each leg.     Chronic issues:   - Depression: Continue sertraline   - Non-severe malnutrition in the context of acute on chronic illness - seeing nutrition here   - Anemia Secondary to CKD and chronic disease. No sign of active bleed     Floor Care  Fluids: none  Food: cardiac diet  Electrolyte repletion: prn  Analgesia: none  Sedation: none  DVT ppx: heparin/warfarin  Stress Ulcer ppx: none  Glycemic Control: none  Lines: pIV, PICC  Therapies: PT/OT  Consults: LVAD/transplant workup  Code Status: Full  Family: updated today in person     Patient was discussed with Dr. Greenwood.      Jean Marie Duke MD PGY5  Cardiology Fellow  [phone]       Subjective:     No acute overnight events.  She otherwise feels well without complaints.          Medications:        amiodarone  400 mg Oral Daily     aspirin  81 mg Oral Daily     cefdinir  300 mg Oral Q24H     fluticasone  1 spray Both Nostrils Daily     furosemide  40 mg Oral BID     ranolazine  500 mg Oral BID     sertraline  50 mg Oral Daily     sodium chloride (PF)  3 mL Intracatheter Q8H       bumetanide 1 mg/hr (01/10/19 0700)     - MEDICATION INSTRUCTIONS -       Reason anticoagulation order not selected       - MEDICATION INSTRUCTIONS -       sodium chloride              Objective:          Physical Exam:   Temp:  [97.4  F (36.3  C)-98  F (36.7  C)] 98  F (36.7  C)  Pulse:  [] 115  Heart Rate:  [] 118  Resp:  [12-20] 16  BP: ()/(39-73) 111/47  SpO2:  [92 %-100 %] 94 %  I/O last 3 completed shifts:  In: 877 [P.O.:540; I.V.:337]  Out: 1675 [Urine:1675]    Vitals:    01/06/19 0700 01/07/19 0551 01/08/19 0437 01/09/19 0504   Weight: 44.4 kg (97 lb 14.2 oz) 44.6 kg (98 lb 4.5 oz) 46.5 kg (102 lb 8 oz) 46.7 kg (103 lb)    01/10/19 0600   Weight: 46.1 kg (101 lb 10.1 oz)     CVP 9  PA 50/18  PCWP 14  MVO2 45%  CO/CI 3.1/2.2  SVR 1700  Augmented mean 80    GEN: alert and oriented  HEENT: facial bruising, [place] in right IJ  CV:  Regular rate and rhythm, mechanical murmurs with spared S2.  LUNGS:  on nasal canula, inspiratory rales at bases  GROIN: no hematoma, R femoral IABP  ABD:  Active bowel sounds, soft, non-tender/non-distended.  No rebound/guarding/rigidity.  EXT:  No edema or cyanosis.  Hands/feet warm to touch with good signs of peripheral perfusion. Good DP pulse.         Data:   BMP  Recent Labs   Lab 01/10/19  0308 01/09/19 2008 01/09/19  0502 01/08/19  1728    134 134 136   POTASSIUM 4.0 4.1 4.4 3.8   CHLORIDE 97 100 98 100   KARINA 8.7 8.3* 8.7 8.4*   CO2 31 29 27 28   BUN 23 24 29 24   CR 1.24* 1.30* 1.31* 1.30*   GLC 94 86 112* 166*     LFTs  Recent Labs   Lab 01/09/19  0502 01/05/19  0346   ALKPHOS 54 50   AST 13 18   ALT 12 36   BILITOTAL 0.6 0.7   PROTTOTAL 7.1 6.8   ALBUMIN 3.5 3.2*       CBC  Recent Labs   Lab 01/10/19  0308 01/09/19  0502 01/08/19  0456 01/07/19  0337   WBC 6.5 9.2 8.7 9.0   RBC 2.98* 3.07* 3.10* 3.03*   HGB 8.6* 8.9* 8.9* 8.8*   HCT 28.3* 29.5* 29.5* 28.4*   MCV 95 96 95 94   MCH 28.9 29.0 28.7 29.0   MCHC 30.4* 30.2* 30.2* 31.0*   RDW 16.7* 16.5* 16.2* 15.7*    297 289 266     INR  Recent Labs   Lab 01/10/19  0308 01/09/19  0502 01/08/19  1345 01/07/19  0337   INR 2.93* 2.77* 2.18* 1.96*     Echo 12/30  Left Ventricle  Left ventricular wall thickness is normal. Severe left ventricular dilation is  present. Severely (EF 10-20%) reduced left ventricular function is present.  Diastolic function not assessed due to presence of prosthetic mitral valve.  Severe diffuse hypokinesis is present.     Right Ventricle  Mild to moderate right ventricular dilation is present. Global right  ventricular function is moderately to severely reduced. A pacemaker lead is  noted in the right ventricle.     Atria  The atria cannot be assessed.     Mitral Valve  s/p 29mm St. Reece MVR(1989). The mean mitral valve gradient is 3.8 mmHg.  Prosthetic valve is well seated, no paravalvular leak and has normal  gradients.        Aortic Valve  s/p 23 mm St. Reece AVR (1998). increased gradient across AV Mean gradient of  23mmHg, peak gradient of 43, Peak velocity of 3.2m/s.     Tricuspid Valve  The tricuspid valve is normal. Mild tricuspid insufficiency is present. Right  ventricular systolic pressure is 25mmHg above the right atrial pressure.     Pulmonic Valve  The pulmonic valve is normal. Trace pulmonic insufficiency is present.     Vessels  Visualized poriton of the aorta are normal in size (proximal ascending aorta  measures 3cm). The pulmonary artery is normal. Unable to assess mean RA  pressure given the patient is on a ventilator. IVC is dilated.     Pericardium  No pericardial effusion is present.    RHC 1/09/19  RA: 20/25/20  RV 75/20  PA 75/22/45  PCWP 34  Sadaf CO 2.0 (1.4)   PVR 5.5  PaSat  40.0 Hb 8.9  MAP 93  SVR 2120

## 2019-01-10 NOTE — CONSULTS
[facility] Endocrinology Consultation    Nicolette Adan MRN# [id number]   Age: 68 year old YOB: 1950   Date of Admission: 12/30/2018     Reason for consult: thyroid       Requesting physician Dr. Duke       Level of consult: Consult, follow and place orders      This is a 68-year-old female patient with history of nonischemic cardiomyopathy, valvular heart disease status post mitral valve replacement, history of cardiac arrest secondary to V. fib status post dual-chamber ICD placement and atrial fibrillation who was admitted to [facility] and was found to have recurrent ventricular tachycardia with ICD shocks.  Patient was transferred to [facility] for further management.    Endocrinology team is being consulted for abnormal thyroid function test results.      Abnormal thyroid function lab results in the setting of treatment with amiodarone: Patient does not have past medical history of thyroid problems.  TSH in August 2018 was within the normal limits.  Thyroid peroxidase antibody test result has come back within the normal limits; indicating no Hashimoto thyroiditis(underlying thyroid condition).  TSI is pending.    She was started on amiodarone 12/19/2018 and TSH checked at an outside facility following that was high at 7.91(normal range for the facility 0.47--5) and free T4 was normal at 1.51.  Here after admission she was noted to have also rises slightly slightly high between 5.43-6.55 with free T4 level also slightly high at 1.48-1.57.  Total T3 was normal and free T3 was low at 1.9.    She is clinically euthyroid other than her known cardiac issues.    Amiodarone treatment in individuals with previously normal thyroid function and no underlying thyroid condition (which is the case in this patient based on work up so far); increases T4 concentrations by up to 20% during the first month of therapy; there is also a mild increase in the TSH  level as well.  The biochemical picture we see in this patient is likely from normal changes we see in amiodarone use. Other DDx includes amiodarone induced thyrotoxicosis; however the absence of clinical thyrotoxic symptoms, low T3 and elevated TSH levels makes this ddx less likely at this point in time.     Recommend monitoring thyroid hormones at this time; please repeat thyroid labs on 1/12/19.   Will follow along.                 Chief Complaint:   This is a 68-year-old female patient with history of nonischemic cardiomyopathy, valvular heart disease status post mitral valve replacement, history of cardiac arrest secondary to V. fib status post dual-chamber ICD placement and atrial fibrillation who was admitted to [facility] and was found to have recurrent ventricular tachycardia with ICD shocks.  Patient was transferred to [facility] for further management.    Endocrinology team is being consulted for thyroid function test which came back abnormal documented below.  Patient has had a normal TSH in 8/2018 which was at 3.14.  As an outside facility; she had a TSH of 7.91 on 12/27/2018 with a free T4 of 1.51(normal range for the facility 0.7-2).  Here after admission she was noted to have TSH slightly high between 5.43-6.55 with free T4 level also slightly high at 1.48-1.57.  Thyroid function test is from this morning documented below.    She denies having any past medical history of thyroid disease.  Denies family history of thyroid disease.  She denies heat intolerance(feels cold), sweating, tremor, anxiety, problems with sleep.  Denies any increased bowel movements.  Denies any significant eye changes including dryness or increased tearing.      Patient was started on amiodarone as an outside facility on 12/19/2018.  After she was transferred from outside facility for VT storm she was continued on amiodarone in addition to her other cardiac medications.  From cardiac  standpoint her active issues are low output state and worsening hemodynamics per reports.  She is currently on amiodarone 400 mg daily.  amiodarone (AKA: PACERONE) 200 mg oral Tablet   Take 400 mg by mouth twice daily. 10 days only then 200 mg daily   0 12/19/2018     ENDO THYROID LABS-[facility] Latest Ref Rng & Units 1/10/2019   TSH 0.40 - 4.00 mU/L 5.60 (H)   T4 TOTAL 4.5 - 13.9 ug/dL 14.5 (H)   T4 FREE 0.76 - 1.46 ng/dL 1.48 (H)   FREE T3 2.3 - 4.2 pg/mL 1.9 (L)   TRIIODOTHYRONINE(T3) 60 - 181 ng/dL 74   THYROGLOBULIN ANTIBODY <40 IU/mL <20   THYR PEROXIDASE CRISTOBAL <35 IU/mL 14          Past Medical History:     Past Medical History:   Diagnosis Date     Anxiety      Congestive heart failure (H)      Heart murmur      History of blood transfusion              Past Surgical History:     Past Surgical History:   Procedure Laterality Date     APPENDECTOMY       CARDIAC SURGERY               Social History:     Social History     Tobacco Use     Smoking status: Never Smoker     Smokeless tobacco: Never Used   Substance Use Topics     Alcohol use: Yes     Alcohol/week: 0.6 oz     Types: 1 Glasses of wine per week     Comment: occational             Family History:   No family history on file.  No family hx of thyroid disorder.           Allergies:   No Known Allergies          Medications:       amiodarone  400 mg Oral Daily     aspirin  81 mg Oral Daily     cefdinir  300 mg Oral Q24H     fluticasone  1 spray Both Nostrils Daily     ranolazine  500 mg Oral BID     sertraline  50 mg Oral Daily     sodium chloride (PF)  3 mL Intracatheter Q8H             Review of Systems:   Per HPI    Physical examination:  BP 92/58   Pulse 71   Temp 98  F (36.7  C) (Oral)   Resp 15   Ht 1.524 m (5')   Wt 46.1 kg (101 lb 10.1 oz)   SpO2 99%   BMI 19.85 kg/m    Body mass index is 19.85 kg/m .    Constitutional: comfortable, pleasant   Eyes: anicteric, normal extra-ocular movements.  Ears, Nose and Throat: thyroid normal in size and no  nodules appreciated.   Cardiovascular: mechanical heart valves.   Respiratory: clear to auscultation anteriorly.    Gastrointestinal: positive bowel sounds, non-tender.   Musculoskeletal: no edema (left extremity).   Neurological: alert and oriented. Reflexes normal.    Psychological: appropriate mood           Data:        ENDO THYROID LABS-[facility] Latest Ref Rng & Units 1/10/2019 1/9/2019   TSH 0.40 - 4.00 mU/L 5.60 (H) 5.43 (H)   T4 TOTAL 4.5 - 13.9 ug/dL 14.5 (H)    T4 FREE 0.76 - 1.46 ng/dL 1.48 (H) 1.57 (H)   FREE T3 2.3 - 4.2 pg/mL 1.9 (L)    TRIIODOTHYRONINE(T3) 60 - 181 ng/dL 74    THYROGLOBULIN ANTIBODY <40 IU/mL <20    THYR PEROXIDASE CRISTOBAL <35 IU/mL 14    PREALBUMIN 15 - 45 mg/dL       ENDO THYROID LABS-[facility] Latest Ref Rng & Units 1/4/2019   TSH 0.40 - 4.00 mU/L 6.55 (H)   T4 TOTAL 4.5 - 13.9 ug/dL    T4 FREE 0.76 - 1.46 ng/dL    FREE T3 2.3 - 4.2 pg/mL    TRIIODOTHYRONINE(T3) 60 - 181 ng/dL    THYROGLOBULIN ANTIBODY <40 IU/mL    THYR PEROXIDASE CRISTOBAL <35 IU/mL    PREALBUMIN 15 - 45 mg/dL 18     My Ocasio MD  Staff Endocrinologist   Endocrinology /[phone]

## 2019-01-10 NOTE — PROGRESS NOTES
Palliative Care attempted to see today, however Nicolette was being evaluated by Gynecology.  We will stop in to see Nicolette tomorrow to complete the consult.     Thank you for involving us in Nicolette's care.     Victoria Koehler MD  Palliative Medicine

## 2019-01-10 NOTE — CONSULTS
Brief Consult    Asked by Cardiology team to perform pap smear for workup for possible heart transplant.  Did discuss that typically after age 65 with adequate screening we would not typically continue screening.  Cardiology still requesting due to possible immunosuppression with transplant.    , s/p 2 NSVDs.  Reports menopause 10 years ago, no vaginal bleeding since this time.  Last pap 2014 NILM    Pap collected and sent to lab.    Bel Pedro MD  OB/GYN PGY4    I have reviewed course and findings with the resident and agree with the note.   Thank you.     Nazia Rucker

## 2019-01-10 NOTE — PLAN OF CARE
A&O, afebrile. In and out of VT, rate 110-120. Paced at 70 otherwise. CVP 12. PA 50/15. CI 2.2. SVR 1750. SvO2 45. Cards aware. IABP 1:1, augmenting 100%. Bumex gtt started at 0000. Net negative 1.2L at this time. Mag and potassium replaced.

## 2019-01-10 NOTE — PROGRESS NOTES
IABP Note:    40 ml 7.5 FR intra aortic balloon placed in the right groin in Cath Lab on 1/9/2019 at 1735. No complications. IABP tech present. Patient then transported to .    IABP 1:1, EKG, 100% augmentation.     Kelle Menon, RRT

## 2019-01-10 NOTE — PLAN OF CARE
PT / 4E - PT HOLDING.  Patient with femoral balloon pump and not appropriate for progression of OOB activity at this time.  OT will continue to follow and address UE ROM and strength.  PT will continue to follow and re-initiate when appropriate.

## 2019-01-11 NOTE — CONSULTS
"[facility]  Palliative Care Consultation Note     Patient: Nicolette Adan  Date of Admission:  12/30/2018     Requesting Clinician / Team: Cards 2  Reason for consult: consulted for patient who is being evaluated for heart transplant/advanced cardiac therapies      Recommendations:     --Heart transplant questionnaire initiated at today's visit (see below under \"Assessment\" for summary). I will return to see Nicolette on 1/13/19 as she reported she was upset by news today that she may not be a candidate for transplant and she asked if we could wait to complete the remainder of the questions until our next meeting.  --We have requested  support for Nicolette and her family today.     These recommendations have been discussed with primary team via note.     Thank you for the opportunity to participate in the care of this patient and family. Our team will sign off for now but please call/re-consult as needed. Please feel free to contact the on-call Palliative provider with any urgent needs.     Patient care plan discussed with attending physician, Dr. Koehler, who agreed with above.      Haley Wilkinson MD  Hospice and Palliative Medicine Fellow  Pager: [phone]     [facility] Inpatient Team Consult pager [phone] (M-F 8-4:30)  After-hours Answering Service [phone]   Palliative Clinic: [phone]          Assessments:  Nicolette Adan is a 68 year old female with PMH that includes HFrEF 2/t nonischemic dilated cardiomyopathy (EF 15%, 12/21/18), rheumatic heart disease s/p mechanical MVR and AVR, afib, and Vfib arrest in 2008 s/p dual chamber ICD.     Pt initially presented to OS ([facility]) with epigastric discomfort and was found to have recurrent VT with ICD shocks. She was subsequently transferred to [facility] with refractory VT and for consideration for advanced therapies. Now being considered for heart transplant, and/or placement of a NuPulse (ambulatory IABP). "      What are your personal hopes/goals for receiving a heart transplant?   --Nicolette is hopeful that she will have more time, and also better quality of life with heart transplant. She is hopeful to have more energy to do things like continue travelling to visit family and taking vacation trips with family. She met two people yesterday who are s/p transplant and notes this gave her hope that she would have good time and more time with a transplant. Nicolette also notes that a transplant would avoid long-term placement of external wires/tubes from an implanted device such as a NuPulse.     What are the activities/abilities that make your life worth living?  --Family is very important to Nicolette, she reports she and her  have been  48 years, they have two adult children and 2 grandchildren. [place] and she enjoyed a recent vacation taking her grandchildren to [place]. She is an avid nayak, and enjoys sewing and making crafts. 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      What does a typical day look like for you?   --Nicolette lives in the community with her , she keeps busy with family members and notes she often bakes/cooks for them.        There are risks for kidney failure in patients with advancing heart disease who need heart transplant. What do you know about dialysis? How would your possible need for dialysis influence your quality of life?   --THIS QUESTION DEFERRED UNTIL OUR NEXT VISIT PER PATIENT REQUEST     The need to be on a ventilator/breathing machine through a tracheostomy (a tube in your neck) is a risk with heart transplant. What are with circumstances you would want your medical providers and family to keep you alive with long term mechanical ventilation?  --THIS QUESTION DEFERRED UNTIL OUR NEXT VISIT PER PATIENT REQUEST     A  heart transplant carries a risk of stroke which, for some people, may limit their ability to communicate their wishes to others.  After a stroke, what sort of outcomes would be unacceptable to you (ie needing to live in nursing facility, loss of independence.. Etc.)  --THIS QUESTION DEFERRED UNTIL OUR NEXT VISIT PER PATIENT REQUEST         Prognosis, Goals, & Planning:     Discussion about disease understanding, prognosis, care goals: Nicolette has a good understanding of her diagnosis and prognosis.    Decision making capacity: she has decisional capacity    Preferred way of decision making: talking through options and what each option would look like with risks/benefits discussed    Health care directive: not on file    Health care agent: not on file    Code Status:  Full Code    POLST: not on file     Coping, Meaning, & Spirituality: Nicolette is coping relatively well but today has been a difficult day, she reports she was told she may not be a candidate for transplant. She agrees to  support and we will request this.         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         History of Present Illness:   Sources of History: patient and electronic health record     Nicolette Adan is a 68 year old female with PMH that includes HFrEF 2/t nonischemic dilated cardiomyopathy (EF 15%, 12/21/18), rheumatic heart disease s/p mechanical MVR and AVR, afib, and Vfib arrest in 2008 s/p dual chamber ICD.Pt initially presented to OSH ([facility]) with epigastric discomfort  and was found to have recurrent VT with ICD shocks. She was subsequently transferred to [facility] with refractory VT and for consideration for advanced therapies. She is now being considered for heart transplant and bridging therapy with NuPulse (ambulatory IABP).      Nicolette's  was bedside and supportive during our visit today. Both were tearful, reported that they were upset by news today that she may not be a candidate for transplant. Nicolette is worried about what this would mean for her in terms of what remaining options are available and what her prognosis would be.      ROS:  Comprehensive ROS is reviewed and is negative except as here & per HPI.      Past Medical History:  Past Medical History        Past Medical History:   Diagnosis Date     Anxiety       Congestive heart failure (H)       Heart murmur       History of blood transfusion              Past Surgical History:  Past Surgical History         Past Surgical History:   Procedure Laterality Date     APPENDECTOMY         CARDIAC SURGERY                           Family History:  Adult children alive and well.      Allergies:  No Known Allergies     Medications:  I have reviewed this patient's medication profile and medications from this hospitalization.      Physical Exam:  Vital Signs: Temp: 97.8  F (36.6  C) Temp src: Oral BP: 100/55 Pulse: 70 Heart Rate: 70 Resp: 14 SpO2: 100 % O2 Device: Nasal cannula with humidification Oxygen Delivery: 2 LPM  Weight: 91 lbs 11.38 oz     Gen: NAD but tearful, pleasant and cooperative with interview and exam  HEENT: normocephalic, no scleral icterus, no perioral lesions  Pulm: no increased work of breathing, able to speak in full sentences with ease  Neuro: AOx4, no tremor  Psych: mood-affect congruent, appropriate eye contact        Data reviewed:  Recent imaging reviewed.     Recent lab data reviewed.     Attending Physician Attestation    Patient seen & evaluated with Dr Wilkinson. I agree with and confirm key  findings in her note. Nicolette was processing the news that she might not be a candidate for heart transplant and what this would mean for her overall prognosis, noting that Dr. Lopez told her upon admission that her prognosis would be <1 year without transplant. We will continue to follow for support, and medical decision making as her treatment options are clarified.  Thank you for involving us in the care of your patient.     Victoria Koehler MD / Palliative Medicine / Pager [phone] / After-Hours Answering Service [phone] / Main Palliative Clinic - [facility] [phone] / [facility] Inpatient Team Consult Pager [phone] (answered 8am-430pm M-F)    Time spent: 45 minutes, with >50% devoted to counseling and/or coordination of care.

## 2019-01-11 NOTE — PLAN OF CARE
Discharge Planner OT   Patient plan for discharge: not discussed  Current status: femoral balloon pump. Pt. tolerated UE there.ex. well from bed with with VSS throughout. Indep. with lt. G/h tasks after set up.  Barriers to return to prior living situation: medical readiness  Recommendations for discharge: home with A, CR pending medical course  Rationale for recommendations: increase strength/activity shamir. To max. Indep. With ADLs/mobility in home/community setting.       Entered by: Zehra Morejon 01/11/2019 10:07 AM

## 2019-01-11 NOTE — PROGRESS NOTES
[facility] Cardiology Progress Note           Assessment and Plan:   Nicolette Adan is a 68 year old female with history of HFrEF 2/2 nonischemic dilated cardiomyopathy (EF 15%, 12/21/18), rheumatic heart disease s/p mechanical MV and AV replacement, atrial fibrillation, and sudden cardiac arrest ventricular fibrillation in 2008 s/p dual chamber ICD with recurrent ICD shocks,  She presented to [facility] for epigastric discomfort and was found to have recurrent VT with ICD shocks. Transferred to [facility] with refractory VT and for consideration for advanced therapies.    IABP was placed due to hypotension despite vasopressin.  She was extubated 12/31.  Morning of 1/2, patient had 60 second run of VT.  EKG not able to capture run, but ectopy appeared to be RBBB in nature, inferior axis, potentially LV apical or LVOT focus.  Due to recurrent NSVT and VT and hemodynamics suggesting cold and dry physiology, we started evaluation for potential LVAD or transplant (O blood type).    Due to decreased venous oxyhemoglobins (34, 24, 32%), indicating calculated cardiac index of about 1.6, she went for repeat RHC, showing elevated filling pressures and CI of 1.4, thus femoral IABP was replaced.  She continues to have short minute long runs of VT.    It was determined that due to her vessel size, Nupulse as the ambulatory IABP would not be a viable option for her.  Final decision on transplant is still pending workup.    # Cardiogenic Shock  - 1/9 5:30 pm RHC showing PCWP 34, Cardiac Index 1.4  - 1/9 9:00 pm Blood pressure 81/47, post IABP insertion  - recent PVR 3.5 based on Mean PA 29, PCWP 14, CO 4.3  - continue IABP and diuresis as needed     # VT   # Hx of cardiac arrest 2/2 VF (2008), s/p dual chamber ICD with recurrent shocks  Poor candidate for VT ablation due to mechanical aortic and mitral valves. Angiogram done on 12/24/18 with normal coronary arteries. Currently on 1:1 IABP.    - amiodarone 400mg  daily   - beta blocker held here due to low output, may consider restarting due to VT frequency.   - increase ranolazine to 1000mg BID   - paravertebral block performed by anesthesiology 1/3   - Monitor lytes, on replacement protocol     # HFrEF exacerbation, EF 15% 12/21/18  # Non ischemic dilated cardiomyopathy secondary to valvular disease  - diuresis: prn based on swan  - GDMT: on hold in the setting of shock  - vasodilators: holding in the setting of fixed obstruction (aortic stenosis)    # Rheumatic heart disease   # Mitral valve replacement, 29mm St. Reece 1989  # Aortic valve replacement, 23 mm St. Reece 1998, echo here with peak velocity 3.2 m/s, at least moderate AS  # Atrial fibrillation, CHADSVASc ~ 4, on warfarin goal 2.5-3.5  AV Paced on tele.    - holding warfarin again, may need to resume heparin infusion.   - Monitor INR     # CKD III - component of low output and cardiorenal.    # Abnormal Thyroid function tests in setting of amiodarone - seen by endocrine here.  Need to monitor.    UTI - started on cefazolin, coag negative staph, switch to oral cefdinir until 1/11    PAD - ISABELLA greater than 1.3 each leg.     Chronic issues:   - Depression: Continue sertraline   - Non-severe malnutrition in the context of acute on chronic illness - seeing nutrition here   - Anemia Secondary to CKD and chronic disease. No sign of active bleed     Floor Care  Fluids: none  Food: cardiac diet  Electrolyte repletion: prn  Analgesia: none  Sedation: none  DVT ppx: heparin/warfarin  Stress Ulcer ppx: none  Glycemic Control: none  Lines: pIV, PICC, swan  Code Status: Full  Family: updated today in person     Patient was discussed with Dr. Heath.      Jean Marie Duke MD PGY5  Cardiology Fellow  [phone]       Subjective:     No acute overnight events.  She otherwise feels well without complaints.  Concerned she is not a great candidate.          Medications:       amiodarone  400 mg Oral Daily     aspirin  81 mg Oral  Daily     cefdinir  300 mg Oral Q24H     fluticasone  1 spray Both Nostrils Daily     ranolazine  500 mg Oral BID     sertraline  50 mg Oral Daily     sodium chloride (PF)  3 mL Intracatheter Q8H       - MEDICATION INSTRUCTIONS -       Reason anticoagulation order not selected       - MEDICATION INSTRUCTIONS -       sodium chloride              Objective:          Physical Exam:   Temp:  [97.6  F (36.4  C)-98.6  F (37  C)] 97.8  F (36.6  C)  Pulse:  [70-71] 70  Heart Rate:  [69-70] 70  Resp:  [14-20] 14  BP: ()/(32-79) 100/55  SpO2:  [92 %-100 %] 100 %  I/O last 3 completed shifts:  In: 598.07 [P.O.:240; I.V.:358.07]  Out: 1775 [Urine:1775]    Vitals:    01/07/19 0551 01/08/19 0437 01/09/19 0504 01/10/19 0600   Weight: 44.6 kg (98 lb 4.5 oz) 46.5 kg (102 lb 8 oz) 46.7 kg (103 lb) 46.1 kg (101 lb 10.1 oz)    01/11/19 0700   Weight: 41.6 kg (91 lb 11.4 oz)     CVP 6  PA 34/16  PCWP 14  MVO2 45%  CO/CI 5.2/3.7  SVR 1100  Augmented mean 80    GEN: alert and oriented  HEENT: facial bruising, [place] in right IJ  CV:  Regular rate and rhythm, mechanical murmurs with spared S2.  LUNGS:  on nasal canula, inspiratory rales at bases  GROIN: no hematoma, R femoral IABP  ABD:  Active bowel sounds, soft, non-tender/non-distended.  No rebound/guarding/rigidity.  EXT:  No edema or cyanosis.  Hands/feet warm to touch with good signs of peripheral perfusion. Good DP pulse.         Data:   BMP  Recent Labs   Lab 01/11/19  0123 01/10/19  1530 01/10/19  0904 01/10/19  0308 01/09/19 2008    135  --  134 134   POTASSIUM 4.0 4.3 4.0 4.0 4.1   CHLORIDE 98 97  --  97 100   KARINA 8.5 8.6  --  8.7 8.3*   CO2 30 29  --  31 29   BUN 27 26  --  23 24   CR 1.43* 1.32*  --  1.24* 1.30*   GLC 91 181*  --  94 86     LFTs  Recent Labs   Lab 01/09/19  0502 01/05/19  0346   ALKPHOS 54 50   AST 13 18   ALT 12 36   BILITOTAL 0.6 0.7   PROTTOTAL 7.1 6.8   ALBUMIN 3.5 3.2*      CBC  Recent Labs   Lab 01/11/19  0407 01/10/19  0308 01/09/19  0502  01/08/19  0456   WBC 5.3 6.5 9.2 8.7   RBC 3.09* 2.98* 3.07* 3.10*   HGB 8.9* 8.6* 8.9* 8.9*   HCT 29.2* 28.3* 29.5* 29.5*   MCV 95 95 96 95   MCH 28.8 28.9 29.0 28.7   MCHC 30.5* 30.4* 30.2* 30.2*   RDW 16.7* 16.7* 16.5* 16.2*    244 297 289     INR  Recent Labs   Lab 01/11/19  0407 01/10/19  0308 01/09/19  0502 01/08/19  1345   INR 2.72* 2.93* 2.77* 2.18*     Echo 12/30  Left Ventricle  Left ventricular wall thickness is normal. Severe left ventricular dilation is  present. Severely (EF 10-20%) reduced left ventricular function is present.  Diastolic function not assessed due to presence of prosthetic mitral valve.  Severe diffuse hypokinesis is present.     Right Ventricle  Mild to moderate right ventricular dilation is present. Global right  ventricular function is moderately to severely reduced. A pacemaker lead is  noted in the right ventricle.     Atria  The atria cannot be assessed.     Mitral Valve  s/p 29mm St. Reece MVR(1989). The mean mitral valve gradient is 3.8 mmHg.  Prosthetic valve is well seated, no paravalvular leak and has normal  gradients.        Aortic Valve  s/p 23 mm St. Reece AVR (1998). increased gradient across AV Mean gradient of  23mmHg, peak gradient of 43, Peak velocity of 3.2m/s.     Tricuspid Valve  The tricuspid valve is normal. Mild tricuspid insufficiency is present. Right  ventricular systolic pressure is 25mmHg above the right atrial pressure.     Pulmonic Valve  The pulmonic valve is normal. Trace pulmonic insufficiency is present.     Vessels  Visualized poriton of the aorta are normal in size (proximal ascending aorta  measures 3cm). The pulmonary artery is normal. Unable to assess mean RA  pressure given the patient is on a ventilator. IVC is dilated.     Pericardium  No pericardial effusion is present.    RHC 1/09/19  RA: 20/25/20  RV 75/20  PA 75/22/45  PCWP 34  Sadaf CO 2.0 (1.4)   PVR 5.5  PaSat 40.0 Hb 8.9  MAP 93  SVR 2120

## 2019-01-11 NOTE — PLAN OF CARE
Uneventful night. 100% paced. IABP 1:1, 100%. VT between 0000 and 0200. K replaced. PA 34/14. CVP 6. CI 3.7. SvO2 72. 2lpm O2. Voiding well, missed two voids, so unable to measure. BS+, no BM. Pulses +2. Groin site is clean and dry.     Riky Noriega RN 01/11/19 6:05 AM    Hours of cares [id number]

## 2019-01-11 NOTE — CONSULTS
[facility]    Pulmonary, Allergy, Critical Care and Sleep Medicine    Initial Consultation  01/11/2019    Nicolette Adan MRN# [id number]   Age: 68 year old YOB: 1950     Date of Admission: 12/30/2018  Reason for Consultation: pre-cardiac transplant  Requesting Team: cardiology    Primary care provider: Hesham Mendez     Assessment and Recommendations:    Nicolette Adan is a 68 year old female with a history of HFrEF seconday to NIC (EF 15%), rheumatic heart disease s/p MVR, aortic valve replacement, cardiac arrest secondary to VF with AICD in place, and atrial fibrillation who presented on 12/30 as a transfer from OSH for VT storm, now being worked up for heart transplant.      1. Very Severe Restrictive Lung Disease: on spirometry, worsening. This is most likely secondary to her severe cardiac enlargement. No evidence of parenchymal lung disease. Some atelectasis of lungs due to enlarged heart.   -Continue supplemental O2, wean as able.   -no pulmonary contraindication to heart transplant  -If undergoes surgery, early ambulation, incentive spirometry, and mucous clearance therapies will be important  -We would be hopeful that her lung function would improve somewhat if heart was not as large.     Pulmonary will sign off, please call if additional questions arise.     Seen and discussed with Dr Perlman William H. Amundson, MD  Pulmonary and Critical Care Fellow   Pager [phone]    Chief Complaint and History of Present Illness:    CC:  VT storm  HPI:   Ms. Adan is a 68 year old female with a history of HFrEF seconday to NIC (EF 15%), rheumatic heart disease s/p MVR, aortic valve replacement, cardiac arrest secondary to VF with AICD in place, and atrial fibrillation who presented on 12/30 as a transfer from OSH for VT storm. She presented initially to [facility] on 12/20 for epigastric pain, after recent syncopal episode with ICD discharge. She was found to have  recurrent VT requiring ICD shocks. She also had a prolonged QTc. She was transferred to [facility] while intubated for consideration of advanced therapy. Here, she underwent IABP placement due to hypotension despite vasopression. She was able to be extubated on 12/31. She continues to have recurrent NSVT and VT, and she is being worked up for either LVAD or heart transplant. She has remained in cardiogenic shock. Pulmonary is consulted as part of pre-transplant work up.     Ms. Adan states that at baseline she did have some dyspnea, however was relatively functional. She has been dealing with her heart issues since age 20. She is able to be pretty active at baseline, walking up stairs without issue, and moving around the neighborhood, walking a few blocks at a time. She moves slower than others, but is happy with her level of function. She does occasionally notice a wheeze, and has a chronic dry cough which she describes as a tickle in her throat. She denies rhinorrhea, congestion, sore throat. No skin changes, rashes, joint pain, muscle pain. No personal history or family history of autoimmune disease. She was once told she has asthma, was trialed on an inhaler, but found no benefit (this was in the 1990s). She last had PFTs in the '80s or '90s. She has no family history of lung disease. She has no personal history of childhood lung infections, but has had pneumonia a couple times in the last few years. She lives in a house with her  and a cat. No birds. Recently traveled to [place] in September. She worked as a  at a school, no exposure to respiratory irritants, hot tubs, mold, etc. She is a never smoker.       Review of Systems:  Complete 12 point ROS negative unless mentioned in HPI  Histories, Prior to Admission Medications, Allergies:    Past Medical History:  Past Medical History:   Diagnosis Date     Anxiety      Congestive heart failure (H)      Heart murmur      History of blood transfusion         Past Surgical History:  Past Surgical History:   Procedure Laterality Date     APPENDECTOMY       CARDIAC SURGERY         Past Social History:  Social History     Socioeconomic History     Marital status:      Spouse name: Not on file     Number of children: Not on file     Years of education: Not on file     Highest education level: Not on file   Social Needs     Financial resource strain: Not on file     Food insecurity - worry: Not on file     Food insecurity - inability: Not on file     Transportation needs - medical: Not on file     Transportation needs - non-medical: Not on file   Occupational History     Not on file   Tobacco Use     Smoking status: Never Smoker     Smokeless tobacco: Never Used   Substance and Sexual Activity     Alcohol use: Yes     Alcohol/week: 0.6 oz     Types: 1 Glasses of wine per week     Comment: occational     Drug use: No     Sexual activity: Not on file   Other Topics Concern     Not on file   Social History Narrative     Not on file     Family History:  Family History   Problem Relation Age of Onset     Heart Disease Father      Medications:    amiodarone  400 mg Oral Daily     aspirin  81 mg Oral Daily     cefdinir  300 mg Oral Q24H     fluticasone  1 spray Both Nostrils Daily     ranolazine  1,000 mg Oral BID     ranolazine  500 mg Oral Once     sertraline  50 mg Oral Daily     sodium chloride (PF)  3 mL Intracatheter Q8H     acetaminophen, acetaminophen, alum & mag hydroxide-simethicone, dextrose, docusate sodium, lidocaine 4%, lidocaine 4%, lidocaine (buffered or not buffered), magnesium sulfate, magnesium sulfate, - MEDICATION INSTRUCTIONS -, melatonin, naloxone, nitroGLYcerin, Reason anticoagulation order not selected, - MEDICATION INSTRUCTIONS -, polyethylene glycol, potassium chloride, potassium chloride with lidocaine, potassium chloride, potassium chloride, potassium chloride, sodium chloride (PF), sodium phosphate, sodium phosphate, sodium phosphate,  sodium phosphate    Allergies:   No Known Allergies    Physical Exam:    Temp:  [97.6  F (36.4  C)-98.6  F (37  C)] 97.8  F (36.6  C)  Pulse:  [70] 70  Heart Rate:  [69-70] 70  Resp:  [14-20] 14  BP: ()/(32-79) 134/49  SpO2:  [92 %-100 %] 100 %    Intake/Output Summary (Last 24 hours) at 1/11/2019 1213  Last data filed at 1/11/2019 1100  Gross per 24 hour   Intake 566 ml   Output 1325 ml   Net -759 ml      General: laying in bed in NAD, frail appearing.   HEENT: anicteric, moist mucosa  Neck: no palpable lymphadenopathy  Chest: decreased at bilateral bases. Normal work of breathing.   Cardiac: RRR. IABP noises. Mechanical murmur. Paced.   Abdomen: Soft, flat, active BS  Extremities: No LE Edema  Neuro: A&Ox3, no focal deficits   Skin: no rash noted    Laboratory, imaging, and microbiologic data:    All laboratory and imaging data reviewed.    Notable for:   K 4.0 Cr 1.43  VBG 7.42/50/35/32  WBC 5.3 Hgb 8.9 Plts 237  INR 2.72    FT4 1.48, FT3 1.9     1/10/19 CXR: 1. Right IJ Branch-Qiana catheter tip projects over the right main pulmonary artery. 2. Unchanged bibasilar streaky opacities. Likely represents atelectasis. 3. Stable small bilateral loculated pleural effusions. 4. Mild pulmonary vascular congestion. 5. Stable enlargement of the cardiac silhouette.    12/30/18 CT chest: 1. Support devices above. 2. Massive cardiomegaly with aortic and mitral valve prostheses. 3. Dilation of the hepatic veins likely related to increased right heart pressures.4. Cholelithiasis without cholecystitis.     Spirometry 1/11:                         1          2          3    FEV1 (L)         0.46     0.45     0.48    FVC (L)           0.53     0.55     0.57    FEV1/FVC       88%     81%     83%    PEF (l/s)          1.44     1.61     1.70  Spirometry 1/5   Patient                          Predicted  FEV1 0.53                               27%  FVC  0.60                                23%  FEV1/FVC  93%                      122%  PEF 1.5 L/s                             29%  Consistent with very severe restriction.

## 2019-01-11 NOTE — PROGRESS NOTES
PFT done @ bedside.  Results are:          1   2    3  FEV1 (L) 0.46 0.45 0.48  FVC (L) 0.53 0.55 0.57  FEV1/FVC  88% 81% 83%  PEF (l/s) 1.44 1.61 1.70

## 2019-01-11 NOTE — PLAN OF CARE
Re: Shift summary  D/I: VSS, rare episodes of VT. Sadaf CI improved, now 2-3. PAP=40-50/20, CVP=10. SGC repositioned at bedside per MD, verified by CXR. Placed pt on 2LNC to facilitate cardiac fxn.   P: Need x24 hrs of heart rhythm w <5% ectopy in order to qualify for internal IABP pump.

## 2019-01-11 NOTE — PROGRESS NOTES
"SPIRITUAL HEALTH SERVICES  SPIRITUAL ASSESSMENT Progress Note (Palliative Focus)  [facility] ([place]) 4E    REFERRAL SOURCE: Palliative care spiritual assessment.    Visit with patient Nicolette Adan, who identifies as Adventism. She described herself today as overwhelmed, and said, \"It's all so raw.\"  She cried for a few minutes with me, then said she would appreciate both  and  follow up in a few days if possible.     Plan: I will follow for spiritual support and will place referral for  .    Cindy Mandel  Palliative   Pager [phone]  [facility] Inpatient Team Consult pager [phone] (M-F 8-4:30)  After-hours Answering Service [phone]    "

## 2019-01-12 NOTE — PLAN OF CARE
RE: Shift summary  D/I: VSS on IABP. Pt sad to learn today that her antibody count is high, making a dominick less likely. PFT at bedside done.  P: Provide support to pt and family at this time. Cont to maximize heart fxn as to decrease likelihood of ectopy- pt needs documented <5% ectopy x24 hrs per cards II.

## 2019-01-12 NOTE — PROGRESS NOTES
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 for epigastric discomfort and was found to have recurrent VT with ICD shocks. Transferred to [facility] with refractory VT and for consideration for advanced therapies.    IABP was placed due to hypotension despite vasopressin.  She was extubated 12/31.  Morning of 1/2, patient had 60 second run of VT. EKG not able to capture run, but ectopy appeared to be RBBB in nature, inferior axis, potentially LV apical or LVOT focus. Due to recurrent NSVT and VT and hemodynamics suggesting cold and dry physiology, we started evaluation for potential LVAD or transplant (O blood type). Due to decreased venous oxyhemoglobins (34, 24, 32%), indicating calculated cardiac index of about 1.6, she went for repeat RHC, showing elevated filling pressures and CI of 1.4, thus femoral IABP was replaced. She continues to have short minute long runs of VT. It was determined that due to her vessel size, Nupulse as the ambulatory IABP would not be a viable option for her. Given her significant PRA (~90%) and given Pt is 68 years old and blood type O, she would be unlikely to get a transplant for several years, if at all, and therefore is not a candidate for transplant. Pt not an inotrope candidate given recurrent VT.     # Cardiogenic Shock  - Given Pt is not a candidate for any advanced therapies as above, ongoing discussion with patient and her family regarding goals of care  - Weaning IABP today, but Pt would like more time with her family before allowing this to be removed     # VT   # Hx of cardiac arrest 2/2 VF (2008), s/p  dual chamber ICD with recurrent shocks  Poor candidate for VT ablation due to mechanical aortic and mitral valves. Angiogram done on 12/24/18 with normal coronary arteries. Currently on 1:1 IABP.    - Amiodarone 400mg daily   - Beta blocker held here due to low output, may consider restarting due to VT frequency if Pt will start home inotropes   - Continue ranolazine 1000 mg BID   - Paravertebral block performed by anesthesiology 1/3   - Monitor lytes, on replacement protocol     # HFrEF Exacerbation, EF 15% 12/21/18  # Non ischemic dilated cardiomyopathy secondary to valvular disease  - Diuresis: PRN based on hemodynamics  - GDMT: on hold in the setting of shock  - Vasodilators: holding in the setting of fixed obstruction (aortic stenosis)    # Rheumatic Heart Disease   # Mitral Valve Replacement, 29mm St. Reece 1989  # Aortic Valve Replacement, 23 mm St. Reece 1998, echo here with peak velocity 3.2 m/s, at least moderate AS  # Atrial fibrillation, CHADSVASc ~ 4, on warfarin goal 2.5-3.5  AV Paced on tele.    - holding warfarin again, started hep gtt low intensity for weaning IABP   - Monitor INR     # CKD III - component of low output and cardiorenal, stable    # Abnormal Thyroid function tests in setting of amiodarone - seen by endocrine here. Continue to monitor.    UTI - complete abx course on 1/11    PAD - ISABELLA greater than 1.3 each leg, continue to monitor     Chronic Issues:   - Depression: Continue PTA sertraline   - Non-severe malnutrition in the context of acute on chronic illness - seeing nutrition here   - Anemia Secondary to CKD and chronic disease. No sign/symptom of active bleed     Floor Care  Fluids: none  Diet: cardiac diet  Electrolyte repletion: PRN  Analgesia: none  Sedation: none  DVT ppx: heparin gtt  Stress Ulcer ppx: none  Glycemic Control: none  Lines: PIV, PICC, PA catheter  Code Status: Full  Family: updated today in person     Patient was seen and discussed with Dr. Heath.      Yennifer  MD Laly  Cardiology Fellow       Subjective:     No acute overnight events. She otherwise feels well without complaints. Pt continues to feel very sad about her chances for advanced therapies.          Medications:       amiodarone  400 mg Oral Daily     aspirin  81 mg Oral Daily     fluticasone  1 spray Both Nostrils Daily     ranolazine  1,000 mg Oral BID     sertraline  50 mg Oral Daily     sodium chloride (PF)  3 mL Intracatheter Q8H       - MEDICATION INSTRUCTIONS -       Reason anticoagulation order not selected       - MEDICATION INSTRUCTIONS -       sodium chloride              Objective:          Physical Exam:   Temp:  [97.6  F (36.4  C)-98.4  F (36.9  C)] 97.8  F (36.6  C)  Pulse:  [70] 70  Heart Rate:  [69-70] 70  Resp:  [14-21] 16  BP: ()/() 95/51  SpO2:  [99 %-100 %] 100 %  I/O last 3 completed shifts:  In: 633 [P.O.:270; I.V.:363]  Out: 1500 [Urine:1500]    Vitals:    01/08/19 0437 01/09/19 0504 01/10/19 0600 01/11/19 0700   Weight: 46.5 kg (102 lb 8 oz) 46.7 kg (103 lb) 46.1 kg (101 lb 10.1 oz) 41.6 kg (91 lb 11.4 oz)    01/12/19 0600   Weight: 40.8 kg (89 lb 15.2 oz)     CVP 8  PA 32/16  PCWP 14  MVO2 68%  CO/CI 4.3/3.2  SVR 1100  Augmented mean 78    GEN: Alert and oriented  HEENT: facial bruising, PA catheter in RIJ  CV:  Regular rate and rhythm, mechanical murmurs with spared S2  LUNGS: On nasal canula, inspiratory rales at bases  GROIN: No hematoma, R femoral IABP  ABD: Active bowel sounds, soft, non-tender/non-distended, no rebound/guarding/rigidity  EXT: No edema or cyanosis, hands/feet warm to touch with good signs of peripheral perfusion, good DP pulse         Data:   BMP  Recent Labs   Lab 01/12/19  0459 01/11/19  1602 01/11/19  1551 01/11/19  1119 01/11/19  0123 01/10/19  1530    137  --   --  136 135   POTASSIUM 4.6 4.5 4.5 4.7 4.0 4.3   CHLORIDE 100 100  --   --  98 97   KARINA 9.0 8.6  --   --  8.5 8.6   CO2 32 30  --   --  30 29   BUN 22 25  --   --  27 26   CR 1.21*  1.35*  --   --  1.43* 1.32*   GLC 89 122*  --   --  91 181*     LFTs  Recent Labs   Lab 01/09/19  0502   ALKPHOS 54   AST 13   ALT 12   BILITOTAL 0.6   PROTTOTAL 7.1   ALBUMIN 3.5      CBC  Recent Labs   Lab 01/11/19  0407 01/10/19  0308 01/09/19  0502 01/08/19  0456   WBC 5.3 6.5 9.2 8.7   RBC 3.09* 2.98* 3.07* 3.10*   HGB 8.9* 8.6* 8.9* 8.9*   HCT 29.2* 28.3* 29.5* 29.5*   MCV 95 95 96 95   MCH 28.8 28.9 29.0 28.7   MCHC 30.5* 30.4* 30.2* 30.2*   RDW 16.7* 16.7* 16.5* 16.2*    244 297 289     INR  Recent Labs   Lab 01/11/19  0407 01/10/19  0308 01/09/19  0502 01/08/19  1345   INR 2.72* 2.93* 2.77* 2.18*     Echo 12/30  Left Ventricle  Left ventricular wall thickness is normal. Severe left ventricular dilation is  present. Severely (EF 10-20%) reduced left ventricular function is present.  Diastolic function not assessed due to presence of prosthetic mitral valve.  Severe diffuse hypokinesis is present.  Right Ventricle  Mild to moderate right ventricular dilation is present. Global right  ventricular function is moderately to severely reduced. A pacemaker lead is  noted in the right ventricle.  Atria  The atria cannot be assessed.  Mitral Valve  s/p 29mm St. Reece MVR(1989). The mean mitral valve gradient is 3.8 mmHg.  Prosthetic valve is well seated, no paravalvular leak and has normal  gradients.  Aortic Valve  s/p 23 mm St. Reece AVR (1998). increased gradient across AV Mean gradient of  23mmHg, peak gradient of 43, Peak velocity of 3.2m/s.  Tricuspid Valve  The tricuspid valve is normal. Mild tricuspid insufficiency is present. Right  ventricular systolic pressure is 25mmHg above the right atrial pressure.  Pulmonic Valve  The pulmonic valve is normal. Trace pulmonic insufficiency is present.  Vessels  Visualized poriton of the aorta are normal in size (proximal ascending aorta  measures 3cm). The pulmonary artery is normal. Unable to assess mean RA  pressure given the patient is on a ventilator. IVC is  dilated.  Pericardium  No pericardial effusion is present.    RHC 1/09/19  RA: 20/25/20  RV 75/20  PA 75/22/45  PCWP 34  Sadaf CO 2.0 (1.4)   PVR 5.5  PaSat 40.0 Hb 8.9  MAP 93  SVR 2120

## 2019-01-12 NOTE — PLAN OF CARE
Shift Summary    Been AAOx4 though mostly asleep since 2200, pleasant, cooperative, appreciative and agreeable. Hasn't exhibited depressive mood this shift and actually smiley. No c/o pain/discomfort. Able to reposition self in bed though calls for assistance PRN. Been breathing regular, unlabored and with equal chest expansion; no SPRAGUE while in bed. Been very cooperative in keeping her RLE, which has WNL CMS with normal palpable pulses, flat and straight. Dr. CELINE Pate been aware of hemodynamic readings as well as the vitals/IABP readings. No alarm from IABP. Swallows without difficulty and been good at keeping within fluid restriction. No BM this shift. Voids per bedpan.

## 2019-01-13 NOTE — PROGRESS NOTES
"SPIRITUAL HEALTH SERVICES  SPIRITUAL ASSESSMENT Progress Note  [facility] ([place]) 4E    VISIT     REFERRAL SOURCE: Unit  referral     Visited with Nicolette and her .  Two other relatives were present.  Her  said, \"It looks like there is nothing more than can do for her...we are taking her home.\"     I anointed Nicolette and we prayed together. Nicolette showed me a prayer pamphlet a friend gave to her.  I noted the image on the pamphlet of Francisco' wounded heart and how He is with her in her heart problems. I encouraged her and her  to reach out to their Isabella  when Franco returns home.     PLAN: Unit  will continue follow for length of stay.           "

## 2019-01-13 NOTE — PHARMACY-ANTICOAGULATION SERVICE
Clinical Pharmacy - Warfarin Dosing Consult     Pharmacy has been consulted to manage this patient s warfarin therapy.  Indication: Mechanical Mitral Valve Replacement;Mechanical Aortic Valve Replacement  Therapy Goal: INR 2.5-3.5  Provider/Team: Daquan II  Warfarin Prior to Admission: Yes  Warfarin PTA Regimen: 1mg Sat; 3mg Sun-Fri  Significant drug interactions: Aspirin & heparin (increased risk of bleeding), amiodarone (elevated INR)  Recent documented change in oral intake/nutrition: Unknown  Dose Comments: 4mg today due to several days of held warfarin    INR   Date Value Ref Range Status   01/13/2019 2.43 (H) 0.86 - 1.14 Final   01/11/2019 2.72 (H) 0.86 - 1.14 Final       Recommend warfarin 4 mg today - INR near goal range despite 4 days of held doses .  Pharmacy will monitor Nicolette Adan daily and order warfarin doses to achieve specified goal.      Please contact pharmacy as soon as possible if the warfarin needs to be held for a procedure or if the warfarin goals change.      Victoria De La Torre, PharmD

## 2019-01-13 NOTE — PROGRESS NOTES
[facility] Endocrinology Brief Progress Note          Assessment and Plan:     This is a 68-year-old female patient with history of nonischemic cardiomyopathy, valvular heart disease status post mitral valve replacement, history of cardiac arrest secondary to V. fib status post dual-chamber ICD placement and atrial fibrillation who was admitted to [facility] and was found to have recurrent ventricular tachycardia with ICD shocks.  Patient was transferred to [facility] for further management.     Endocrinology team was consulted for abnormal thyroid function test results.       Abnormal thyroid function lab results in the setting of treatment with amiodarone: resolved.     Patient does not have past medical history of thyroid problems.  TSH in August 2018 was within the normal limits.  Thyroid peroxidase antibody test result has come back within the normal limits; indicating no Hashimoto thyroiditis(underlying thyroid condition).       She was started on amiodarone 12/19/2018 and TSH checked at an outside facility following that was high at 7.91(normal range for the facility 0.47--5) and free T4 was normal at 1.51.  Here after admission she was noted to have also rises slightly slightly high between 5.43-6.55 with free T4 level also slightly high at 1.48-1.57.  Total T3 was normal and free T3 was low at 1.9. She was clinically euthyroid other than her known cardiac issues.     Working diagnosis for the abnormal thyroid function test results was felt to be secondary to normal transient changes usually seen in a setting of amiodarone use. Follow labs were obtained few days later which were within the normal limits.  The normal follow up labs support the working diagnosis.     Recommendation:  Check thyroid function tests periodically while on amiodarone.  Repeat labs in 1-2 weeks.  Endocrinology team signs off at this time. Findings and plan regarding thyroid lab results  was discussed with patient and family on 1/13/19.       Physical Examinations:  /82   Pulse 70   Temp 97.9  F (36.6  C) (Oral)   Resp 20   Ht 1.524 m (5')   Wt 38 kg (83 lb 12.4 oz)   SpO2 97%   BMI 16.36 kg/m    Body mass index is 16.36 kg/m .          Data:     ENDO THYROID LABS-[facility] Latest Ref Rng & Units 1/12/2019   TSH 0.40 - 4.00 mU/L 3.80   T4 TOTAL 4.5 - 13.9 ug/dL    T4 FREE 0.76 - 1.46 ng/dL 1.44   FREE T3 2.3 - 4.2 pg/mL    TRIIODOTHYRONINE(T3) 60 - 181 ng/dL       My Ocasio MD  Staff Endocrinologist  [phone]

## 2019-01-13 NOTE — PROGRESS NOTES
[facility], [place]  Procedure Note           Intra-Aortic Balloon Pump Discontinuation:       Nicolette Adan  MRN# [id number]   January 13, 2019, 5:02 PM             IABP discontinued: January 13, 2019, 5:02 PM   Removed by: Dr rachael Arrieta   Catheter saved:  NO      Recorded by Mary Muller

## 2019-01-13 NOTE — PLAN OF CARE
Shift durration: 7675-4950    LOC: alert, oriented x4  Neuro: intact.  Cards: Paced DDDR at 70.  IABP on 1:2 at shift change.  CI ~2, SVR ~1900's, SVO2 ~62-54, CVP ~8, PAP ~32-40/18 on 1:1. Pt on heparin gtt at 550, recheck Xa due at 2130.    Pulm: LS clear/dim.  Pt on 2 LPM NC.  GI/: adequate UOP, pt had BM today.  Pt on cardiac diet and tolerating 1.5L fluid restriction.  Skin: no new concerns found today.  Mobility: bedrest for R groin sheath IABP, able to reposition upon command in bed.  Vasc access: R internal jugular cordis, swan at 59.  R brachial PICC, R PIV (heparin gtt)  Special/Event: continue to medically manage and optimize pt cardiac function.

## 2019-01-13 NOTE — PROGRESS NOTES
IABP removed at 4:55 PM without any complications. Right groin site is clean, dry, and intact. Pt tolerated the procedure well without any issues.     Yennifer Ruffin MD  Cardiology Fellow

## 2019-01-13 NOTE — PROGRESS NOTES
[facility] Cardiology Progress Note           Assessment and Plan:   Nicolette Adan is a 68 year old female with history of HFrEF 2/2 nonischemic dilated cardiomyopathy (EF 15%, 12/21/18), rheumatic heart disease s/p mechanical MV and AV replacement, atrial fibrillation, and sudden cardiac arrest ventricular fibrillation in 2008 s/p dual chamber ICD with recurrent ICD shocks,  She presented to [facility] for epigastric discomfort and was found to have recurrent VT with ICD shocks. Transferred to [facility] with refractory VT and for consideration for advanced therapies.    IABP was placed due to hypotension despite vasopressin.  She was extubated 12/31.  Morning of 1/2, patient had 60 second run of VT. EKG not able to capture run, but ectopy appeared to be RBBB in nature, inferior axis, potentially LV apical or LVOT focus. Due to recurrent NSVT and VT and hemodynamics suggesting cold and dry physiology, we started evaluation for potential LVAD or transplant (O blood type). Due to decreased venous oxyhemoglobins (34, 24, 32%), indicating calculated cardiac index of about 1.6, she went for repeat RHC, showing elevated filling pressures and CI of 1.4, thus femoral IABP was replaced. She continues to have short minute long runs of VT. It was determined that due to her vessel size, Nupulse as the ambulatory IABP would not be a viable option for her. Given her significant PRA (~90%) and given Pt is 68 years old and blood type O, she would be unlikely to get a transplant for several years, if at all, and therefore is not a candidate for transplant. Pt not an inotrope candidate given recurrent VT.     Changes Today:  - Wean IABP to 1:3 today, monitor how patient tolerates this with potential plan to remove later today vs tomorrow  - Continue to monitor hemodynamics, diurese as needed    # Cardiogenic Shock  - Given Pt is not a candidate for any advanced therapies as above, ongoing discussion with patient and her  family regarding goals of care  - Weaning IABP today, but Pt would like more time with her family before allowing this to be removed, maybe later on today     # VT   # Hx of cardiac arrest 2/2 VF (2008), s/p dual chamber ICD with recurrent shocks  Poor candidate for VT ablation due to mechanical aortic and mitral valves. Angiogram done on 12/24/18 with normal coronary arteries. Currently on 1:1 IABP.    - Amiodarone 400mg daily   - Beta blocker held here due to low output, may consider restarting due to VT frequency if Pt will start home inotropes   - Continue ranolazine 1000 mg BID   - Paravertebral block performed by anesthesiology 1/3   - Monitor lytes, on replacement protocol     # HFrEF Exacerbation, EF 15% 12/21/18  # Non ischemic dilated cardiomyopathy secondary to valvular disease  - Diuresis: PRN based on hemodynamics  - GDMT: on hold in the setting of shock  - Vasodilators: holding in the setting of fixed obstruction (aortic stenosis)    # Rheumatic Heart Disease   # Mitral Valve Replacement, 29mm St. Reece 1989  # Aortic Valve Replacement, 23 mm St. Reece 1998, echo here with peak velocity 3.2 m/s, at least moderate AS  # Atrial fibrillation, CHADSVASc ~ 4, on warfarin goal 2.5-3.5  AV Paced on tele.    - Will resume warfarin tonight, on hep gtt with low intensity for weaning IABP   - Monitor INR     # CKD III - component of low output and cardiorenal, stable    # Abnormal Thyroid function tests in setting of amiodarone - seen by endocrine here. Continue to monitor.    UTI - complete abx course on 1/11    PAD - ISABELLA greater than 1.3 each leg, continue to monitor     Chronic Issues:   - Depression: Continue PTA sertraline   - Non-severe malnutrition in the context of acute on chronic illness - seeing nutrition here   - Anemia Secondary to CKD and chronic disease. No sign/symptom of active bleed     Floor Care  Fluids: none  Diet: cardiac diet  Electrolyte repletion: PRN  Analgesia: none  Sedation: none  DVT  ppx: heparin gtt  Stress Ulcer ppx: none  Glycemic Control: none  Lines: PIV, PICC, PA catheter  Code Status: Full  Family: updated today in person     Patient was seen and discussed with Dr. Heath.      Yennifer Ruffin MD  Cardiology Fellow       Subjective:     No acute overnight events. She otherwise feels well without complaints. Pt continues overall to feel very sad about her prognosis.          Medications:       amiodarone  400 mg Oral Daily     aspirin  81 mg Oral Daily     fluticasone  1 spray Both Nostrils Daily     ranolazine  1,000 mg Oral BID     sertraline  50 mg Oral Daily     sodium chloride (PF)  3 mL Intracatheter Q8H       HEParin 550 Units/hr (01/13/19 0300)     - MEDICATION INSTRUCTIONS -       - MEDICATION INSTRUCTIONS -       sodium chloride              Objective:          Physical Exam:   Temp:  [97.8  F (36.6  C)-98.2  F (36.8  C)] 97.9  F (36.6  C)  Pulse:  [69-80] 70  Heart Rate:  [69-72] 70  Resp:  [16-20] 18  BP: ()/(39-86) 120/63  SpO2:  [98 %-100 %] 98 %  I/O last 3 completed shifts:  In: 930.5 [P.O.:495; I.V.:435.5]  Out: 2050 [Urine:2050]    Vitals:    01/09/19 0504 01/10/19 0600 01/11/19 0700 01/12/19 0600   Weight: 46.7 kg (103 lb) 46.1 kg (101 lb 10.1 oz) 41.6 kg (91 lb 11.4 oz) 40.8 kg (89 lb 15.2 oz)    01/13/19 0400   Weight: 38 kg (83 lb 12.4 oz)     CVP 8  PA 32/16  PCWP 14  MVO2 68%  CO/CI 4.3/3.2  SVR 1100  Augmented mean 78    GEN: Alert and oriented  HEENT: facial bruising, PA catheter in RIJ  CV:  Regular rate and rhythm, mechanical murmurs with spared S2  LUNGS: On nasal canula, inspiratory rales at bases  GROIN: No hematoma, R femoral IABP  ABD: Active bowel sounds, soft, non-tender/non-distended, no rebound/guarding/rigidity  EXT: No edema or cyanosis, hands/feet warm to touch with good signs of peripheral perfusion, good DP pulse         Data:   BMP  Recent Labs   Lab 01/13/19  0457 01/12/19  2240 01/12/19  1548 01/12/19  0459    136 137 138   POTASSIUM  4.4 4.4 4.5 4.6   CHLORIDE 98 98 99 100   KARINA 8.9 8.5 8.3* 9.0   CO2 32 34* 31 32   BUN 23 24 22 22   CR 1.22* 1.29* 1.26* 1.21*   * 154* 153* 89     LFTs  Recent Labs   Lab 01/09/19  0502   ALKPHOS 54   AST 13   ALT 12   BILITOTAL 0.6   PROTTOTAL 7.1   ALBUMIN 3.5      CBC  Recent Labs   Lab 01/13/19  0457 01/12/19  2240 01/12/19  1114 01/11/19  0407   WBC 5.8 6.1 5.1 5.3   RBC 3.07* 3.09* 3.08* 3.09*   HGB 9.0* 9.0* 9.0* 8.9*   HCT 29.4* 29.7* 29.7* 29.2*   MCV 96 96 96 95   MCH 29.3 29.1 29.2 28.8   MCHC 30.6* 30.3* 30.3* 30.5*   RDW 17.0* 16.9* 16.9* 16.7*    252 248 237     INR  Recent Labs   Lab 01/13/19  0457 01/11/19  0407 01/10/19  0308 01/09/19  0502   INR 2.43* 2.72* 2.93* 2.77*     Echo 12/30  Left Ventricle  Left ventricular wall thickness is normal. Severe left ventricular dilation is  present. Severely (EF 10-20%) reduced left ventricular function is present.  Diastolic function not assessed due to presence of prosthetic mitral valve.  Severe diffuse hypokinesis is present.  Right Ventricle  Mild to moderate right ventricular dilation is present. Global right  ventricular function is moderately to severely reduced. A pacemaker lead is  noted in the right ventricle.  Atria  The atria cannot be assessed.  Mitral Valve  s/p 29mm St. Reece MVR(1989). The mean mitral valve gradient is 3.8 mmHg.  Prosthetic valve is well seated, no paravalvular leak and has normal  gradients.  Aortic Valve  s/p 23 mm St. Reece AVR (1998). increased gradient across AV Mean gradient of  23mmHg, peak gradient of 43, Peak velocity of 3.2m/s.  Tricuspid Valve  The tricuspid valve is normal. Mild tricuspid insufficiency is present. Right  ventricular systolic pressure is 25mmHg above the right atrial pressure.  Pulmonic Valve  The pulmonic valve is normal. Trace pulmonic insufficiency is present.  Vessels  Visualized poriton of the aorta are normal in size (proximal ascending aorta  measures 3cm). The pulmonary artery  is normal. Unable to assess mean RA  pressure given the patient is on a ventilator. IVC is dilated.  Pericardium  No pericardial effusion is present.    RHC 1/09/19  RA: 20/25/20  RV 75/20  PA 75/22/45  PCWP 34  Sadaf CO 2.0 (1.4)   PVR 5.5  PaSat 40.0 Hb 8.9  MAP 93  SVR 2120

## 2019-01-13 NOTE — PLAN OF CARE
Nights:  ZI  PT slept 6-8 hours overnight.  Alert and oriented.  Bedrest.  IABP 1:2, 80% augmentation.  MAP 80-90's.  HR 70 paced.  Lungs clear 2 lpm N/C.  Urine about 30cc /hr bedpan.  Denies pain.  Turn and repo Q 2hrs with wedge pillow and buttock no longer pink.  Heparin therapeutic at 550 Units/hr.  Afebrile.  No ectopy.  CVP 13, PA 44/15, CI 2.9, SVR 1400's.    P:  Hemodynamics Q 6hrs.  Turn and repo to prevent pressure ulcer.

## 2019-01-14 NOTE — PROGRESS NOTES
"[facility], [place]    Palliative Care Progress Note    Patient: Nicolette Adan  Date of Admission:  12/30/2018    Recommendations:    --Very brief introduction to hospice today, patient stated \"I'm not ready for that\" and did not wish to discuss further  --Recommended patient consider follow up in Palliative Care clinic, however she does not wish to have a referral to Palliative clinic generated at this time. There is a palliative care clinic through [facility] and recommend patient be given information about their services for possible self referral if/when she is ready. Until then she should have close f/u with PCP and cardiology close to home.   --Discussed option of very low dose opiates as-needed to assist with dypnea management, Pt does not wish to initiate this at this time.  - ongoing  support while inpatient  - will discuss with palliative SW to assess for further coping support while inpatient.    These recommendations have been discussed with primary team in person     Thank you for the opportunity to participate in the care of this patient and family. With clear goals at this time our palliative medical team will not plan further f/u goals discussions and will follow peripherally.    Patient care plan discussed with attending physician, Dr. Xavier, who agreed with above.      Haley Wilkinson MD  Hospice and Palliative Medicine Fellow  Pager: [phone]       Patient seen and evaluated with Dr. Wilkinson.   Agree with assessment and recommendations.    Total time spent was 40 minutes,  >50% of time was spent counseling and/or coordination of care regarding symptoms, goals and support.    Paige Xavier  Palliative Care   Pager [phone]    [facility] Inpatient Team Consult pager [phone] (M-F 8-4:30)  After-hours Answering Service [phone]   Palliative Clinic: [phone]      Assessment  Nicolette Adan is a 68 year old female with PMH that " "includes HFrEF 2/t nonischemic dilated cardiomyopathy (EF 15%, 12/21/18), rheumatic heart disease s/p mechanical MVR and AVR, afib, and Vfib arrest in 2008 s/p dual chamber ICD. Pt initially presented to [facility] ([facility] with epigastric discomfort and was found to have recurrent VT with ICD shocks. She was subsequently transferred to [facility] with refractory VT and for consideration for advanced therapies, has been considered for heart transplant, LVAD, placement of a NuPulse (ambulatory IABP) and ionotrope therapies. She is unfortunately not a candidate for these therapies.     Symptoms:   SOB: intermittent, have been longstanding PTA with acute episodes of increased SOB that seem to get better without intervention.     Prognosis, Goals, & Planning: See initial consult note for details. Additional info reviewed today: Per Pt report, life expectancy 6mo-1yr without interventions. She is tearful and states she \"can't wrap her head around this\" and is distressed that she will not be able to see her grandchildren grow up. She wonders if not knowing her prognosis would have brought her more peace.    Coping, Meaning, & Spirituality: See initial consult note for details. Additional info reviewed today: Pt met with Palliative Care  on 1/11/19 and with  on 1/13/19. She is having a very difficult time coping with prognosis and that she is not a candidate for interventions like heart transplant.         Social: See initial consult note for details. Additional info reviewed today:          Interval History:      Pt is able to tell me she was told she is not a candidate for heart transplant, LVAD, NuPulse or ionotrope therapy, then stating \"they can't do anything.\" She notes that she feels angry and frustrated. She has intermittent dyspnea, this is longstanding and was happening before admission with periods of time of increased dyspnea that she would \"get through\" and then feel better.        Medications:   I " have reviewed this patient's medication profile and medications during this hospitalization          Review of Systems:   A comprehensive ROS has been negative other than stated in the HPI and below:     Palliative Symptom Review (0=no symptom/no concern, 1=mild, 2=moderate, 3=severe):      Pain: 0      Fatigue: 1      Nausea: 0      Constipation: 0      Diarrhea: 0      Depressive Symptoms: 1      Anxiety: 1-2      Drowsiness: 0      Poor Appetite: 1      Shortness of Breath: 1-2      Insomnia: 0          Physical Exam:     Vital Signs: Temp: 98  F (36.7  C) Temp src: Oral BP: 98/67 Pulse: 70 Heart Rate: 70 Resp: 18 SpO2: 96 % O2 Device: None (Room air) Oxygen Delivery: 2 LPM  Weight: 83 lbs 12.4 oz    Physical Exam:  Gen: NAD but tearful in interview, cooperative with interview and exam  HEENT: normocephalic, no perioral lesions  Pulm: able to speak in full sentences with ease, no increased work of breathing at time of interview/exam  Neuro: AOx4, no tremor  Psych: mood-affect congruent, appropriate eye contact      Data Reviewed:   Recent imaging reviewed.    Recent lab data reviewed.

## 2019-01-14 NOTE — PROGRESS NOTES
"SPIRITUAL HEALTH SERVICES  SPIRITUAL ASSESSMENT Progress Note  [employer] ([facility]) [id number]     REFERRAL SOURCE: Palliative Consult Service      I visited with pt and her . He said he was not sure when she was going home.  Pt told me, \"They give me 6-12 months to live.\" We discussed that she is approaching the end of her life.  She was quiet. We discussed relevant Bible passages, including that Francisco has taken all our sufferings on himself.  I encouraged her to go to [facility], knowing that He is always with her, even when she is alone.  I encouraged her that when she does return home to connect with her [place]  and community for support.      PLAN: Palliative Consult Service  will continue to provide spiritual support during duration of pt's stay.    Tera Ramirez M.Div.       "

## 2019-01-14 NOTE — PLAN OF CARE
Pt A&O x4. Denies pain. Stable on room air. LS clear/dim. 100% paced at 70 bpm. BP WNL. SvO2 decreased to 37%, and cardiac index to 1.9, resident notified. No interventions at this time. Pt voiding minimally, 210 ml this shift. IABP site WNL. Will continue to monitor.

## 2019-01-14 NOTE — PLAN OF CARE
OT/CR 4E: Discharge Planner OT   Patient plan for discharge: home ( available to provide assist)  Current status: Pt now with IABP removed; overall goals of care unclear though pt motivated to remain ambulatory in order to return to home.  Pt able to walk approx 350ft with CGA while pushing wheelchair, LOB x 1.  VSS on RA  Barriers to return to prior living situation: acute medical needs and goals of care  Recommendations for discharge: Home with assist  Rationale for recommendations: Pt will benefit from ongoing therapy intervention while inpatient to ensure the ability to safely discharge to home.  Pending prognosis and overall goals of care - pt may benefit from OP PT to address balance deficits though unclear whether goals will be restorative.       Entered by: Bette Vasquez 01/14/2019 1:25 PM

## 2019-01-14 NOTE — PLAN OF CARE
Shift durration: [id number]     LOC: alert, oriented x4  Neuro: intact.  Cards: Paced DDDR at 70.  IABP explanted at 1700 per Cards 2.  CI ~2.1, SVR ~2300's, SVO2 ~50's, CVP ~10, PAP ~44/20. Pt restarted coumadin tonight..    Pulm: LS clear/dim.  Pt on 2 LPM NC.  GI/: adequate UOP, pt had BM today.  Pt on cardiac diet and tolerating 1.5L fluid restriction.  Skin: no new concerns found today.  Mobility: bedrest until 2300, able to reposition upon command in bed.  Vasc access: R internal jugular cordis, swan at 59, may be d/c'd tomorrow per cards 2.  R brachial PICC, R PIV.  Special/Event: continue to medically manage and optimize pt cardiac function.

## 2019-01-14 NOTE — PROGRESS NOTES
[facility] Cardiology Progress Note           Assessment and Plan:   Nicolette Adan is a 68 year old female with history of HFrEF 2/2 nonischemic dilated cardiomyopathy (EF 15%, 12/21/18), rheumatic heart disease s/p mechanical MV and AV replacement, atrial fibrillation, and sudden cardiac arrest ventricular fibrillation in 2008 s/p dual chamber ICD with recurrent ICD shocks,  She presented to [facility] for epigastric discomfort and was found to have recurrent VT with ICD shocks. Transferred to [facility] with refractory VT and for consideration for advanced therapies.    IABP was placed due to hypotension despite vasopressin.  She was extubated 12/31.  Morning of 1/2, patient had 60 second run of VT. EKG not able to capture run, but ectopy appeared to be RBBB in nature, inferior axis, potentially LV apical or LVOT focus. Due to recurrent NSVT and VT and hemodynamics suggesting cold and dry physiology, we started evaluation for potential LVAD or transplant (O blood type). Due to decreased venous oxyhemoglobins (34, 24, 32%), indicating calculated cardiac index of about 1.6, she went for repeat RHC, showing elevated filling pressures and CI of 1.4, thus femoral IABP was replaced. She continues to have short minute long runs of VT. It was determined that due to her vessel size, Nupulse as the ambulatory IABP would not be a viable option for her. Given her significant PRA (~90%) and given Pt is 68 years old and blood type O, she would be unlikely to get a transplant for several years, if at all, and therefore is not a candidate for transplant. Pt not an inotrope candidate given recurrent VT.     Changes Today:  - Bumex 1 mg IV this AM, will monitor response and re-dose later on, as appropriate  - Will start hydralazine 10 mg Q8H and monitor HD change, if feeling well later on with getting up, will consider removing PA catheter    # Cardiogenic Shock  - Given Pt is not a candidate for any advanced therapies  as above, ongoing discussion with patient and her family regarding goals of care  - Removed IABP on 1/13, hemodynamics down-trending upon removal  - Starting hydralazine 10 mg Q8H     # VT   # Hx of cardiac arrest 2/2 VF (2008), s/p dual chamber ICD with recurrent shocks  Poor candidate for VT ablation due to mechanical aortic and mitral valves. Angiogram done on 12/24/18 with normal coronary arteries. Currently on 1:1 IABP.    - Amiodarone 400 mg daily   - Beta blocker held here due to low output, may consider restarting due to VT frequency if Pt will start home inotropes   - Continue ranolazine 1000 mg BID   - Paravertebral block performed by anesthesiology 1/3   - Monitor lytes, on replacement protocol     # HFrEF Exacerbation, EF 15% 12/21/18  # Non ischemic dilated cardiomyopathy secondary to valvular disease  - Diuresis: PRN based on hemodynamics  - GDMT: starting hydralazine 10 mg Q8H  - Vasodilators: holding in the setting of fixed obstruction (aortic stenosis)    # Rheumatic Heart Disease   # Mitral Valve Replacement, 29mm St. Reece 1989  # Aortic Valve Replacement, 23 mm St. Reece 1998, echo here with peak velocity 3.2 m/s, at least moderate AS  # Atrial fibrillation, CHADSVASc ~ 4, on warfarin goal 2.5-3.5  AV Paced on tele.    - Resumed warfarin 1/13 PM   - Monitor INR daily     # CKD III - component of low output and cardiorenal, stable    # Abnormal Thyroid function tests in setting of amiodarone - seen by endocrine here. Continue to monitor.    # UTI - completed abx course on 1/11    # PAD - ISABELLA greater than 1.3 each leg, continue to monitor     #  Chronic Issues:   - Depression: Continue PTA sertraline   - Non-severe malnutrition in the context of acute on chronic illness - seeing nutrition here   - Anemia Secondary to CKD and chronic disease. No sign/symptom of active bleed     Floor Care  Fluids: none  Diet: cardiac diet  Electrolyte repletion: PRN  Analgesia: none  Sedation: none  DVT ppx:  warfarin  Stress Ulcer ppx: none  Glycemic Control: none  Lines: PIV, PICC, PA catheter  Code Status: Full  Family: updated today in person     Patient was seen and discussed with Dr. Heath.      Yennifer Ruffin MD  Cardiology Fellow       Subjective:     Hemodynamics continued to downtrend after removal of IABP. Pt denies feel any different and denies any SOB, chest pain, palpitations, or increased fatigue. Pt has felt better being able to sit up and move around now that the IABP is out.    A 4-pt ROS was negative except as above.          Medications:       amiodarone  400 mg Oral Daily     aspirin  81 mg Oral Daily     fluticasone  1 spray Both Nostrils Daily     ranolazine  1,000 mg Oral BID     sertraline  50 mg Oral Daily     sodium chloride (PF)  3 mL Intracatheter Q8H       - MEDICATION INSTRUCTIONS -       - MEDICATION INSTRUCTIONS -       Warfarin Therapy Reminder              Objective:          Physical Exam:   Temp:  [97.8  F (36.6  C)-98.6  F (37  C)] 98.4  F (36.9  C)  Pulse:  [70-74] 70  Heart Rate:  [69-76] 70  Resp:  [15-22] 15  BP: ()/(33-78) 103/65  SpO2:  [91 %-100 %] 91 %  I/O last 3 completed shifts:  In: 975.75 [P.O.:640; I.V.:335.75]  Out: 800 [Urine:800]    Vitals:    01/09/19 0504 01/10/19 0600 01/11/19 0700 01/12/19 0600   Weight: 46.7 kg (103 lb) 46.1 kg (101 lb 10.1 oz) 41.6 kg (91 lb 11.4 oz) 40.8 kg (89 lb 15.2 oz)    01/13/19 0400   Weight: 38 kg (83 lb 12.4 oz)     CVP 10  PA 52/22  PCWP 18  MVO2 37%  CO/CI 2.4/1.9  SVR 2165    GEN: Alert and oriented  HEENT: facial bruising, PA catheter in RIJ  CV: Regular rate and rhythm, mechanical murmurs with spared S2  LUNGS: On nasal canula, inspiratory rales at bases  GROIN: clean, dry, intact with no hematoma  ABD: Active bowel sounds, soft, non-tender/non-distended, no rebound/guarding/rigidity  EXT: No edema or cyanosis, hands/feet warm to touch with good signs of peripheral perfusion, good DP pulse         Data:   BMP  Recent Labs    Lab 01/14/19  0347 01/13/19  1841 01/13/19  0457 01/12/19  2240    138 137 136   POTASSIUM 4.5 4.8 4.4 4.4   CHLORIDE 100 101 98 98   KARINA 9.1 8.5 8.9 8.5   CO2 29 31 32 34*   BUN 24 24 23 24   CR 1.28* 1.24* 1.22* 1.29*   * 115* 100* 154*     LFTs  Recent Labs   Lab 01/09/19  0502   ALKPHOS 54   AST 13   ALT 12   BILITOTAL 0.6   PROTTOTAL 7.1   ALBUMIN 3.5      CBC  Recent Labs   Lab 01/14/19 0347 01/13/19  1841 01/13/19  0457 01/12/19  2240   WBC 6.5 6.0 5.8 6.1   RBC 3.08* 3.12* 3.07* 3.09*   HGB 9.0* 9.2* 9.0* 9.0*   HCT 29.7* 30.1* 29.4* 29.7*   MCV 96 97 96 96   MCH 29.2 29.5 29.3 29.1   MCHC 30.3* 30.6* 30.6* 30.3*   RDW 17.0* 17.0* 17.0* 16.9*    261 241 252     INR  Recent Labs   Lab 01/14/19 0347 01/13/19  0457 01/11/19  0407 01/10/19  0308   INR 2.35* 2.43* 2.72* 2.93*     Echo 12/30  Left Ventricle  Left ventricular wall thickness is normal. Severe left ventricular dilation is  present. Severely (EF 10-20%) reduced left ventricular function is present.  Diastolic function not assessed due to presence of prosthetic mitral valve.  Severe diffuse hypokinesis is present.  Right Ventricle  Mild to moderate right ventricular dilation is present. Global right  ventricular function is moderately to severely reduced. A pacemaker lead is  noted in the right ventricle.  Atria  The atria cannot be assessed.  Mitral Valve  s/p 29mm St. Reece MVR(1989). The mean mitral valve gradient is 3.8 mmHg.  Prosthetic valve is well seated, no paravalvular leak and has normal  gradients.  Aortic Valve  s/p 23 mm St. Reece AVR (1998). increased gradient across AV Mean gradient of  23mmHg, peak gradient of 43, Peak velocity of 3.2m/s.  Tricuspid Valve  The tricuspid valve is normal. Mild tricuspid insufficiency is present. Right  ventricular systolic pressure is 25mmHg above the right atrial pressure.  Pulmonic Valve  The pulmonic valve is normal. Trace pulmonic insufficiency is present.  Vessels  Visualized  poriton of the aorta are normal in size (proximal ascending aorta  measures 3cm). The pulmonary artery is normal. Unable to assess mean RA  pressure given the patient is on a ventilator. IVC is dilated.  Pericardium  No pericardial effusion is present.    [facility] 1/09/19  RA: 20/25/20  RV 75/20  PA 75/22/45  PCWP 34  Sadaf CO 2.0 (1.4)   PVR 5.5  PaSat 40.0 Hb 8.9  MAP 93  SVR 2120

## 2019-01-15 NOTE — PROGRESS NOTES
Care Coordinator Progress Note    Admission Date/Time:  12/30/2018  Attending MD:  Misty Lopez, *    Data  Chart reviewed, discussed with interdisciplinary team.   Patient was admitted for:    Cardiogenic shock (H)  Ventricular tachycardia (H)  VT (ventricular tachycardia) (H).    Assessment  Pt was informed that she is not a candidate for heart transplant and IABP removed on 1/13.  Pt is not Inotrope candidate given recurrent VT.   Per chart review and report, Palliative and cards 2 team tried to talk to pt and family about hospice option.  Pt and family expressed that they are not ready to discuss about hospice.  RNCC tried to visit pt and spouse for support.  Pt was with visitor and spouse was sitting out side pt room at time of my visit.  Pt spouse wasn't interested to have a visit.  RNCC informed pt spouse that I will cont to follow and assist with any RNCC assistance need.  Pt spouse agreed.  RNCC will cont to follow plan of care.     Plan  Anticipated Discharge Date:  TBD.  Anticipated Discharge Plan:   TBD.  RNCC will cont to follow plan of care.      Kim Rivera RN, PHN, BSN  4A and 4E/ ICU  Care Coordinator  Phone: [phone]  Pager: [phone]

## 2019-01-15 NOTE — PROGRESS NOTES
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 Transferred to [facility] with refractory VT and for consideration for advanced therapies.    IABP was placed due to hypotension despite vasopressin.  She was extubated 12/31.  Morning of 1/2, patient had 60 second run of VT. EKG not able to capture run, but ectopy appeared to be RBBB in nature, inferior axis, potentially LV apical or LVOT focus. Due to recurrent NSVT and VT and hemodynamics suggesting cold and dry physiology, we started evaluation for potential LVAD or transplant (O blood type). Due to decreased venous oxyhemoglobins (34, 24, 32%), indicating calculated cardiac index of about 1.6, she went for repeat RHC, showing elevated filling pressures and CI of 1.4, thus femoral IABP was replaced. She continues to have short minute long runs of VT. It was determined that due to her vessel size, Nupulse as the ambulatory IABP would not be a viable option for her. Given her significant PRA (~90%) and given Pt is 68 years old and blood type O, she would be unlikely to get a transplant for several years, if at all, and therefore is not a candidate for transplant. Pt not an inotrope candidate given recurrent VT.     Changes Today:  - Increasing pacer rate to 75  - Torsemide 20 mg PO this AM, will monitor response and re-dose later on, as appropriate  - Start coreg 3.125 mg BID  - Transfer to floor    # Cardiogenic Shock  - Given Pt is not a candidate for any advanced therapies as above, ongoing discussion with patient and her  family regarding goals of care  - Removed IABP on 1/13, hemodynamics down-trending upon removal  - Started hydralazine 10 mg Q8H on 1/14, started coreg 3.125 mg BID     # VT   # Hx of cardiac arrest 2/2 VF (2008), s/p dual chamber ICD with recurrent shocks  Poor candidate for VT ablation due to mechanical aortic and mitral valves. Angiogram done on 12/24/18 with normal coronary arteries. Currently on 1:1 IABP.    - Amiodarone 400 mg daily   - Resumed coreg, but at 3.125 mg BID compared to PTA 12.5 mg BID   - Continue ranolazine 1000 mg BID   - Paravertebral block performed by anesthesiology 1/3   - Monitor lytes, on replacement protocol     # HFrEF Exacerbation, EF 15% 12/21/18  # Non ischemic dilated cardiomyopathy secondary to valvular disease  - Diuresis: PRN based on hemodynamics  - GDMT: started hydralazine 10 mg Q8H, started coreg 3/125 mg BID    # Rheumatic Heart Disease   # Mitral Valve Replacement, 29mm St. Reece 1989  # Aortic Valve Replacement, 23 mm St. Reece 1998, echo here with peak velocity 3.2 m/s, at least moderate AS  # Atrial fibrillation, CHADSVASc ~ 4, on warfarin goal 2.5-3.5  AV Paced on tele.    - Resumed warfarin 1/13 PM   - Monitor INR daily     # CKD III - component of low output and cardiorenal, stable    # Abnormal Thyroid function tests in setting of amiodarone - seen by endocrine here. Continue to monitor.    # UTI - completed abx course on 1/11    # PAD - ISABELLA greater than 1.3 each leg, continue to monitor     #  Chronic Issues:   - Depression: Continue PTA sertraline   - Non-severe malnutrition in the context of acute on chronic illness - seeing nutrition here   - Anemia Secondary to CKD and chronic disease. No sign/symptom of active bleed     Floor Care  Fluids: none  Diet: cardiac diet  Electrolyte repletion: PRN  Analgesia: none  Sedation: none  DVT ppx: warfarin  Stress Ulcer ppx: none  Glycemic Control: none  Lines: PIV, PICC  Code Status: Full  Family: updated today in  person     Patient was seen and discussed with Dr. Heath.      Yennifer Ruffin MD  Cardiology Fellow       Subjective:     Went into slow VT this AM, but remained hemodynamically stable. Pt reports she feels the same as usual with no acute changes.    A 4-pt ROS was negative except as above.          Medications:       amiodarone  400 mg Oral Daily     aspirin  81 mg Oral Daily     fluticasone  1 spray Both Nostrils Daily     hydrALAZINE  10 mg Oral Q8H MARILIA     ranolazine  1,000 mg Oral BID     sertraline  50 mg Oral Daily     sodium chloride (PF)  3 mL Intracatheter Q8H       - MEDICATION INSTRUCTIONS -       - MEDICATION INSTRUCTIONS -       Warfarin Therapy Reminder              Objective:          Physical Exam:   Temp:  [97.6  F (36.4  C)-98.8  F (37.1  C)] 98.8  F (37.1  C)  Pulse:  [70-71] 70  Heart Rate:  [69-96] 70  Resp:  [14-20] 20  BP: ()/(47-76) 119/68  SpO2:  [90 %-99 %] 92 %  I/O last 3 completed shifts:  In: 544 [P.O.:400; I.V.:144]  Out: 950 [Urine:950]    Vitals:    01/10/19 0600 01/11/19 0700 01/12/19 0600 01/13/19 0400   Weight: 46.1 kg (101 lb 10.1 oz) 41.6 kg (91 lb 11.4 oz) 40.8 kg (89 lb 15.2 oz) 38 kg (83 lb 12.4 oz)     GEN: Alert and oriented  HEENT: facial bruising  CV: Regular rate and rhythm, mechanical murmurs with spared S2  LUNGS: Inspiratory rales at bases  GROIN: clean, dry, intact with no hematoma  ABD: Active bowel sounds, soft, non-tender/non-distended, no rebound/guarding/rigidity  EXT: No edema or cyanosis, hands/feet warm to touch with good signs of peripheral perfusion, good DP pulse         Data:   BMP  Recent Labs   Lab 01/15/19  0408 01/14/19  1731 01/14/19  0347 01/13/19  1841    136 136 138   POTASSIUM 4.5 4.2 4.5 4.8   CHLORIDE 99 100 100 101   KAIRNA 8.9 8.1* 9.1 8.5   CO2 27 30 29 31   BUN 36* 29 24 24   CR 1.58* 1.44* 1.28* 1.24*   * 160* 116* 115*     LFTs  Recent Labs   Lab 01/09/19  0502   ALKPHOS 54   AST 13   ALT 12   BILITOTAL 0.6   PROTTOTAL  7.1   ALBUMIN 3.5      CBC  Recent Labs   Lab 01/15/19  0408 01/14/19  0347 01/13/19  1841 01/13/19  0457   WBC 6.1 6.5 6.0 5.8   RBC 2.98* 3.08* 3.12* 3.07*   HGB 8.7* 9.0* 9.2* 9.0*   HCT 28.6* 29.7* 30.1* 29.4*   MCV 96 96 97 96   MCH 29.2 29.2 29.5 29.3   MCHC 30.4* 30.3* 30.6* 30.6*   RDW 17.1* 17.0* 17.0* 17.0*    248 261 241     INR  Recent Labs   Lab 01/15/19  0408 01/14/19  0347 01/13/19  0457 01/11/19  0407   INR 3.38* 2.35* 2.43* 2.72*     Echo 12/30  Left Ventricle  Left ventricular wall thickness is normal. Severe left ventricular dilation is  present. Severely (EF 10-20%) reduced left ventricular function is present.  Diastolic function not assessed due to presence of prosthetic mitral valve.  Severe diffuse hypokinesis is present.  Right Ventricle  Mild to moderate right ventricular dilation is present. Global right  ventricular function is moderately to severely reduced. A pacemaker lead is  noted in the right ventricle.  Atria  The atria cannot be assessed.  Mitral Valve  s/p 29mm St. Reece MVR(1989). The mean mitral valve gradient is 3.8 mmHg.  Prosthetic valve is well seated, no paravalvular leak and has normal  gradients.  Aortic Valve  s/p 23 mm St. Reece AVR (1998). increased gradient across AV Mean gradient of  23mmHg, peak gradient of 43, Peak velocity of 3.2m/s.  Tricuspid Valve  The tricuspid valve is normal. Mild tricuspid insufficiency is present. Right  ventricular systolic pressure is 25mmHg above the right atrial pressure.  Pulmonic Valve  The pulmonic valve is normal. Trace pulmonic insufficiency is present.  Vessels  Visualized poriton of the aorta are normal in size (proximal ascending aorta  measures 3cm). The pulmonary artery is normal. Unable to assess mean RA  pressure given the patient is on a ventilator. IVC is dilated.  Pericardium  No pericardial effusion is present.    RHC 1/09/19  RA: 20/25/20  RV 75/20  PA 75/22/45  PCWP 34  Sadaf CO 2.0 (1.4)   PVR 5.5  PaSat 40.0 Hb  8.9  MAP 93  [facility] 2120

## 2019-01-15 NOTE — PLAN OF CARE
"Care hours [id number]    No acute events overnight. VSS on RA. SWAN discontinued last evening per order without complications. 400 mL UOP from Bumex. Patient denied pain. Although stated that she \"rarely\" slept.   "

## 2019-01-15 NOTE — PROGRESS NOTES
Pt A&O, denying any dizziness or lightheadedness, hydralazine introduced today, dee numbers slightly improved.  Pt fairly weak/unsteady on feet.  Pt voiding, net negative through shift. Tolerating diet.  Denying pain.

## 2019-01-16 NOTE — PROGRESS NOTES
Care Coordinator Progress Note    Admission Date/Time:  12/30/2018  Attending MD:  Misty Lopez  Data  Chart reviewed, discussed with interdisciplinary team.   Patient was admitted for:    Cardiogenic shock (H)  Ventricular tachycardia (H)  VT (ventricular tachycardia) (H).    Concerns with insurance coverage for discharge needs: None stated by pt's family.  Current Living Situation: Patient lives with spouse.  Support System: Supportive and Involved  and family.   Services Involved:Outpt INR and Warfarin monitoring with PCP at [facility].   Transportation at Discharge: Family or friend will provide  Transportation to Medical Appointments:   - Name of caregiver:   Barriers to Discharge: medical condition.     Coordination of Care and Referrals: Provided patient/family with options for home care.     Assessment  Per MD, pt will need medication adjustments.   Pt's  wants home care arranged in [place].   Pt's family also requesting to have a room available on 1/19/19 to have a family Elo party. This writer spoke with Alisa Sage [phone]) and reserved the [facility] on 8th floor from 9 AM to 5 PM. Security will need to open the room for pt's family.   Intervention:   Arrangements made with [facility] (Ph: [phone] Fax: [phone]) for RN eval post hospitalization, assess vital signs, respiratory and cardiac status, activity tolerance, hydration, nutritional status, med set up and management. INR lab draws; with results to Dr. Hesham Mendez at [facility]. PT/OT eval and treat for deconditioning, strengthening, and endurance. HHA for assist with ADL's. Social Work for referral to community resources.     Plan  Anticipated Discharge Date:  TBD  Anticipated Discharge Plan: discharge to home with home care.      GERMANIA JONES, RN BSN  Care Coordinator Unit [address]C  [phone]

## 2019-01-16 NOTE — PROGRESS NOTES
Transfer  Transferred from:   Via:bed  Reason for transfer: Improved pt condition  Family: Aware of transfer  Belongings: Received with pt  Chart: Received with pt  Medications: Meds received from old unit with pt  Report received from: Krish PEREZ  Pt status:  /64 (BP Location: Left arm)   Pulse 93   Temp 98  F (36.7  C)   Resp 16   Ht 1.524 m (5')   Wt 45.5 kg (100 lb 6.4 oz)   SpO2 93%   BMI 19.61 kg/m    '

## 2019-01-16 NOTE — PLAN OF CARE
OT holding treatment this day d/t INR >4. PT continue to hold pending OT assessment of mobility and balance screen to assess PT needs.

## 2019-01-16 NOTE — PLAN OF CARE
/64 (BP Location: Left arm)   Pulse 93   Temp 98  F (36.7  C)   Resp 16   Ht 1.524 m (5')   Wt 45.5 kg (100 lb 6.4 oz)   SpO2 93%   BMI 19.61 kg/m      Alert and oriented x4. VSS on room air. AV paced. Denies pain. On 2 gram Na diet with 1.5L fluid restriction. Fair appetite. Pt complaining of intermittent nausea. Declined interventions. No BM overnight. Voiding adequately. Up with standby assist and a walker. PICC and PIV saline locked. Pt slept between cares. Plan for discharge to home today and tomorrow. Will continue to monitor and notify MDs accordingly.

## 2019-01-16 NOTE — PLAN OF CARE
OT-6C: Cancel. Pt not appropriate with INR at 5.03. Not appropriate for therapy when INR>4 per therapy policy. Will reschedule.

## 2019-01-16 NOTE — PROGRESS NOTES
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 Transferred to [facility] with refractory VT and for consideration for advanced therapies.    IABP was placed due to hypotension despite vasopressin.  She was extubated 12/31.  Morning of 1/2, patient had 60 second run of VT. EKG not able to capture run, but ectopy appeared to be RBBB in nature, inferior axis, potentially LV apical or LVOT focus. Due to recurrent NSVT and VT and hemodynamics suggesting cold and dry physiology, we started evaluation for potential LVAD or transplant (O blood type). Due to decreased venous oxyhemoglobins (34, 24, 32%), indicating calculated cardiac index of about 1.6, she went for repeat RHC, showing elevated filling pressures and CI of 1.4, thus femoral IABP was replaced. She continues to have short minute long runs of VT. It was determined that due to her vessel size, Nupulse as the ambulatory IABP would not be a viable option for her. Given her significant PRA (~90%) and given Pt is 68 years old and blood type O, she would be unlikely to get a transplant for several years, if at all, and therefore is not a candidate for transplant. Pt not an inotrope candidate given recurrent VT.     Changes Today:  - Increasing pacer rate to 75  - Torsemide 40 mg PO BID, will monitor response and re-dose later on, as appropriate  - Stop coreg given no change in VT  - Discussed with Pt and family about re-introducing palliative care, palliative re-consulted  - Holding warfarin tonight given INR 5.19 this  AM    # Cardiogenic Shock  - Given Pt is not a candidate for any advanced therapies as above, ongoing discussion with patient and her family regarding goals of care  - Removed IABP on 1/13, hemodynamics down-trending upon removal  - Started hydralazine 10 mg Q8H on 1/14, stopped coreg     # VT   # Hx of cardiac arrest 2/2 VF (2008), s/p dual chamber ICD with recurrent shocks  Poor candidate for VT ablation due to mechanical aortic and mitral valves. Angiogram done on 12/24/18 with normal coronary arteries. Currently on 1:1 IABP.    - Amiodarone 400 mg daily   - Stopped coreg   - Continue ranolazine 1000 mg BID   - Paravertebral block performed by anesthesiology 1/3   - Monitor lytes, on replacement protocol     # HFrEF Exacerbation, EF 15% 12/21/18  # Non ischemic dilated cardiomyopathy secondary to valvular disease  - Diuresis: PRN based on hemodynamics  - GDMT: started hydralazine 10 mg Q8H, started coreg 1/16    # Rheumatic Heart Disease   # Mitral Valve Replacement, 29mm St. Reece 1989  # Aortic Valve Replacement, 23 mm St. Reece 1998, echo here with peak velocity 3.2 m/s, at least moderate AS  # Atrial fibrillation, CHADSVASc ~ 4, on warfarin goal 2.5-3.5  AV Paced on tele.    - Resumed warfarin 1/13 PM, INR supratherapeutic on 1/16 AM   - Monitor INR daily     # CKD III - component of low output and cardiorenal, stable    # Abnormal Thyroid function tests in setting of amiodarone - seen by endocrine here. Continue to monitor.    # UTI - completed abx course on 1/11    # PAD - ISABELLA greater than 1.3 each leg, continue to monitor     #  Chronic Issues:   - Depression: Continue PTA sertraline   - Non-severe malnutrition in the context of acute on chronic illness - seeing nutrition here   - Anemia Secondary to CKD and chronic disease. No sign/symptom of active bleed     Floor Care  Fluids: none  Diet: cardiac diet  Electrolyte repletion: PRN  Analgesia: none  Sedation: none  DVT ppx: warfarin  Stress Ulcer ppx:  none  Glycemic Control: none  Lines: PIV, PICC  Code Status: Full  Family: updated today in person     Patient was seen and discussed with Dr. Heath.      Yennifer Ruffin MD  Cardiology Fellow       Subjective:     No acute complaints. Pt reports she feels the same as usual with no acute changes.    A 4-pt ROS was negative except as above.          Medications:       amiodarone  400 mg Oral Daily     aspirin  81 mg Oral Daily     carvedilol  3.125 mg Oral BID w/meals     fluticasone  1 spray Both Nostrils Daily     hydrALAZINE  10 mg Oral Q8H MARILIA     lidocaine  1 patch Transdermal Q24h    And     lidocaine   Transdermal Q24H    And     lidocaine   Transdermal Q8H     ranolazine  1,000 mg Oral BID     sertraline  50 mg Oral Daily     sodium chloride (PF)  3 mL Intracatheter Q8H     torsemide  20 mg Oral Daily       - MEDICATION INSTRUCTIONS -       - MEDICATION INSTRUCTIONS -       Warfarin Therapy Reminder              Objective:          Physical Exam:   Temp:  [97.3  F (36.3  C)-98.2  F (36.8  C)] 97.3  F (36.3  C)  Pulse:  [] 93  Heart Rate:  [] 112  Resp:  [15-30] 16  BP: ()/(42-80) 111/51  SpO2:  [92 %-98 %] 92 %  I/O last 3 completed shifts:  In: 1030 [P.O.:1010; I.V.:20]  Out: 1050 [Urine:1050]    Vitals:    01/11/19 0700 01/12/19 0600 01/13/19 0400 01/16/19 0139   Weight: 41.6 kg (91 lb 11.4 oz) 40.8 kg (89 lb 15.2 oz) 38 kg (83 lb 12.4 oz) 45.5 kg (100 lb 6.4 oz)     GEN: Alert and oriented  HEENT: facial bruising  CV: Regular rate and rhythm, mechanical murmurs with spared S2  LUNGS: Inspiratory rales at bases  GROIN: clean, dry, intact with no hematoma  ABD: Active bowel sounds, soft, non-tender/non-distended, no rebound/guarding/rigidity  EXT: No edema or cyanosis, hands/feet warm to touch with good signs of peripheral perfusion, good DP pulse         Data:   BMP  Recent Labs   Lab 01/16/19  0559 01/15/19  1611 01/15/19  0408 01/14/19  1731    133 135 136   POTASSIUM 4.5 3.9 4.5  4.2   CHLORIDE 95 95 99 100   KARINA 8.8 8.8 8.9 8.1*   CO2 28 28 27 30   BUN 42* 35* 36* 29   CR 2.11* 1.67* 1.58* 1.44*   * 152* 122* 160*     LFTs  No lab results found in last 7 days.   CBC  Recent Labs   Lab 01/16/19  0559 01/15/19  0408 01/14/19  0347 01/13/19  1841   WBC 7.6 6.1 6.5 6.0   RBC 3.02* 2.98* 3.08* 3.12*   HGB 8.9* 8.7* 9.0* 9.2*   HCT 28.7* 28.6* 29.7* 30.1*   MCV 95 96 96 97   MCH 29.5 29.2 29.2 29.5   MCHC 31.0* 30.4* 30.3* 30.6*   RDW 16.9* 17.1* 17.0* 17.0*    237 248 261     INR  Recent Labs   Lab 01/16/19  0559 01/15/19  0408 01/14/19  0347 01/13/19  0457   INR 5.19* 3.38* 2.35* 2.43*     Echo 12/30  Left Ventricle  Left ventricular wall thickness is normal. Severe left ventricular dilation is  present. Severely (EF 10-20%) reduced left ventricular function is present.  Diastolic function not assessed due to presence of prosthetic mitral valve.  Severe diffuse hypokinesis is present.  Right Ventricle  Mild to moderate right ventricular dilation is present. Global right  ventricular function is moderately to severely reduced. A pacemaker lead is  noted in the right ventricle.  Atria  The atria cannot be assessed.  Mitral Valve  s/p 29mm St. Reece MVR(1989). The mean mitral valve gradient is 3.8 mmHg.  Prosthetic valve is well seated, no paravalvular leak and has normal  gradients.  Aortic Valve  s/p 23 mm St. Reece AVR (1998). increased gradient across AV Mean gradient of  23mmHg, peak gradient of 43, Peak velocity of 3.2m/s.  Tricuspid Valve  The tricuspid valve is normal. Mild tricuspid insufficiency is present. Right  ventricular systolic pressure is 25mmHg above the right atrial pressure.  Pulmonic Valve  The pulmonic valve is normal. Trace pulmonic insufficiency is present.  Vessels  Visualized poriton of the aorta are normal in size (proximal ascending aorta  measures 3cm). The pulmonary artery is normal. Unable to assess mean RA  pressure given the patient is on a ventilator.  IVC is dilated.  Pericardium  No pericardial effusion is present.    RHC 1/09/19  RA: 20/25/20  RV 75/20  PA 75/22/45  PCWP 34  Sadaf CO 2.0 (1.4)   PVR 5.5  PaSat 40.0 Hb 8.9  MAP 93  SVR 2120

## 2019-01-16 NOTE — CARE CONFERENCE
"CARE CONFERENCE: 10 AM to 11 AM    Care conference requested by Cards 2 Attending to discuss plan of care and discharge planning.   Those in attendance: Jon Adan (Pt's ), Malgorzata Mora (Pt's daughter), Dr. Heath (Cards 2 Attending), Yennifer Ruffin (Cards 2 Fellow), Roxy Curry RN CC (Unit 6C).        Dr. Heath addressed the conference and gave an overview and update of pt's current medical condition. Complete explanation given to pt's  and pt's daughter of why pt does not have any options for advanced therapy. Dr. Heath said that he will speak with Dr. Sharpe at [place] to see if [facility] is able to offer any advanced therapy that is not available at [facility]. Pt's  expressed concern about having \"false hope\".      Dr. Ruffin explained further about pt's need for medication adjustments and pt's continued episodes of VT. Possibility of turning off defibrillator discussed. Pt's  said that pt currently has declined to fill out a Health Care Directive and he wants it to be completed. Pt's  is now in agreement with having pt be seen by Palliative Care team.      Home care in [place] requested by pt's  and pt's daughter along with request to have pt's family and grandchildren celebrate their [place] while pt is still being cardiac monitored as an inpt.   PLAN:   1. Have Palliative Care Team consult with pt.   2. Attempt to have Health Care Directive completed.   3. Set up home care.   4. Dr. Heath to contact Dr. Sharpe at [place].     "

## 2019-01-17 NOTE — PROVIDER NOTIFICATION
MD Notification    MD Notified: Cards 2 cross cover    Notification date/time: 1/16/19 2145    Notification interaction: Spoke with MD over phone    Purpose of notification: Pt becoming increasingly confused. Having hallucinations, grabbing at things that aren't there. Pt is able to answer orientation questions appropriately but makes statements that do not make sense. Having significant tremors, pt's  states she has always been shaky but this is the worst he has seen. Pt becoming very weak, requiring assist of 2 to 3 people to get off of commode. Report SOB, O2 sats in mid 80's while on RA, now requiring 3L NC.     Comments: No new orders received at this time. MD states increased confusions and tremors was noted earlier in the day and would like to continue to monitor pt for now.

## 2019-01-17 NOTE — PROVIDER NOTIFICATION
19 0300   Call Information   Date of Call 19   Time of Call 0308   Name of person requesting the team Constance   Title of person requesting team RN   RRT Arrival time (already present at bedside)   Time RRT ended 353   Reason for call   Type of RRT Adult   Primary reason for call Sepsis suspected   Sepsis Suspected Elevated Lactate level;WBC <4 or >12   Was patient transferred from the ED, ICU, or PACU within last 24 hours prior to RRT call? No   SBAR   Situation LA 4.5    Background History of HFrEF 2/2 nonischemic dilated cardiomyopathy (EF 15%, 18), rheumatic heart disease s/p MV replacement (29mm St. Reece, ), cardiac arrest 2/2 VF in  s/p dual chamber ICD with recurrent ICD shocks, atrial fibrillation. She presented to [facility] for epigastric discomfort and was found to have recurrent VT with ICD shocks. Transferred to [facility] with refractory VT and for consideration for advanced therapies.    Notable History/Conditions Cardiac;Congestive heart failure   Assessment Pt with increased jerking/tremors, difficulty speaking  but following commands.    Interventions Labs   Patient Outcome   Patient Outcome Code blue called;Code status altered;Patient    RRT Team   Date Attending Physician notified 19   Time Attending Physician notified 0308   Physician(s) Elin Hayes    Lead RN Nell Cerna/ Carol Vega     Called to patient room at 230 with concerns about neuro status. Pt having increased tremors/jerking and difficulty w/ speech. Pt did follow commands. Neuro to bedside to see pt and suggested checking Ammonia, pt having myoclonic jerking.  Labs checked and LA 4.5 and RRT called. Labs worsening, renal and liver function worsening. While in pt room, pt noted noted to be hypotensive/bradycardic, followed by perioral cyanosis. Pulse thready and pt cool and clammy. Code Blue called however family arrived at that moment and  stated they didn't want anything else done. Nursing clairified that they did not want compressions, medications or other interventions. MD to bedside, comfort cares ordered, code blue cancelled. Pt passed shortly after with family at bedside.

## 2019-01-17 NOTE — DISCHARGE SUMMARY
[facility], [place]    Death Summary  Cardiology    Date of Admission:  12/30/2018  Date of Death:         1/17/2019  Provider Completing Death Summary: Yennifer Ruffin    Discharge Diagnoses   Ventricular tachycardia  Cardiogenic shock  Heart failure with reduced ejection fraction  Nonischemic dilated cardiomyopathy  Rheumatic heart disease status-post mechanical mitral and aortic valve replacement  Atrial fibrillation  Chronic kidney disease stage III    History of Present Illness   Nicolette Adan is an 68 year old female with history of HFrEF 2/2 nonischemic dilated cardiomyopathy (EF 15%, 12/21/18), rheumatic heart disease s/p MV replacement (29mm [place], 1989)and aortic valve replacement (23 mm [place], 1998), cardiac arrest 2/2 VF in 2008 s/p dual chamber ICD with recurrent ICD shocks, atrial fibrillation. She presented to [facility] on 12/20/18 for epigastric pain. She had recently had a syncopal episode and her ICD discharged - she was hospitalized for that, found to be hypotensive, and her carvedilol and losartan were stopped. During her stay at [facility], she was found to have recurrent ventricular tachycardia and was receiving ICD shocks. She was found to have a prolonged QT. There was concern regarding EP involvement due to her mechanical mitral and aortic valves so she was managed medically with amiodarone. A coronary angiogram was done on 12/24/18 which showed patent coronary arteries. A RHC showed RA 17, RV 49/6, PA 47/22 with a mean of 31, wedge 19, DOMENICO CO 2.75, CI, 1.9 on 12/24/18. Her ICD was reprogrammed with VT-1 zone set at 130 bpm and detection time 3 seconds. Was started on lidocaine gtt 12/25/18. Pt was transferred to [facility] on 12/30/18 intubated, sedated on multiple drips.    Hospital Course   Nicolette Adan was admitted on 12/30/2018.  The following problems were addressed during her hospitalization:    Patient was admitted with  recurrent ventricular tachycardia storm s/p multiple shocks and cardiogenic shock. She was referred to [facility] for evaluation for advanced heart failure therapies. Initially upon arrival an IABP was placed due to hypotension. She was extubated on 12/31. On the morning, of 1/2, patient developed a 60-second run of VT, but an EKG was not able to capture the run. IABP was initially removed on 1/2. Due to recurrent NSVT and VT along with hemodynamics suggesting a cold and dry physiology, an evaluation for advanced therapies was started. Given biventricular failure and mechanical valves, it was determined Pt would not be appropriate for an LVAD. While evaluating patient for a possible NuPulse vs consideration of heart transplant, Pt again decompensated, and need another IABP placed. Unfortunately, evaluation revealed, patient was not a candidate for a NuPulse because her vessels were too small for the device. Pt was also found to have PRA level ~90%, and given that she was 68 years old, type O blood, and very small, it would be unlikely she would survive long enough to ever get a heart transplant. Pt continued to have intermittent VT throughout her admission, despite optimizing antiarrhythmics, so Pt was determined not a candidate for inotrope therapy. Once evaluation was completed, and it was determined Pt was not a candidate for any advanced therapy, numerous discussions were had with patient and her family regarding overall very grim prognosis. Palliative care was consulted. Despite attempting to optimize medical therapy for heart failure, without options for advanced therapies, the IABP was removed on 1/13. Pt's condition deteriorated within a few days of removing the IABP. Patient and family were updated regularly about changes in patient's condition. On 1/16, Pt's kidney and liver function declined rapidly, and she began to be intermittently altered. In times of lucidity, Pt confirmed she still wanted to be full code,  but once it was apparent, patient could not recover from her condition, patient's family elected for a comfort care only approach, and with family present at her bedside, she . Autopsy was discussed with the patient's family, but they declined.    Cause of death: cardiogenic shock    Discussed with Dr. Heath.    Yennifer Ruffin MD  Cardiology Fellow    I have seen and examined the patient on 2019 and agree with the above outlined hospital course.  Juan Heath MD  [facility] Division of Cardiology      Primary Care Physician   AYANNA CAPONE    Consultations This Hospital Stay   CARDIOLOGY ELECTROPHYSIOLOGY (EP) IP CONSULT  SPEECH LANGUAGE PATH ADULT IP CONSULT  PHARMACY TO DOSE WARFARIN  CARDIOTHORACIC SURGERY ADULT IP CONSULT  PALLIATIVE CARE ADULT IP CONSULT  NEUROPSYCHOLOGY IP CONSULT  NUTRITION SERVICES ADULT IP CONSULT  CORE CLINIC EVALUATION IP CONSULT  SOCIAL WORK IP CONSULT  PHYSICAL THERAPY ADULT IP CONSULT  OCCUPATIONAL THERAPY ADULT IP CONSULT  VASCULAR ACCESS ADULT IP CONSULT  NUTRITION SERVICES ADULT IP CONSULT  ENDOCRINE NON-DIABETES ADULT IP CONSULT  PALLIATIVE CARE ADULT IP CONSULT  PULMONARY GENERAL ADULT IP CONSULT  PALLIATIVE CARE ADULT IP CONSULT  VASCULAR ACCESS CARE ADULT IP CONSULT  PHARMACY TO DOSE WARFARIN  PALLIATIVE CARE ADULT IP CONSULT  PHARMACY IP CONSULT  SMOKING CESSATION PROGRAM IP CONSULT    Time Spent on this Encounter   I personally saw the patient today and spent greater than 30 minutes discharging this patient.    Data   Most Recent 3 CBC's:  Recent Labs   Lab Test 19  0254 19  0559 01/15/19  0408   WBC 13.1*  13.5* 7.6 6.1   HGB 8.9*  9.0* 8.9* 8.7*   MCV 97  97 95 96     320 265 237      Most Recent 3 BMP's:  Recent Labs   Lab Test 19  0254 19  2326 19  1713    132* 132*   POTASSIUM 5.7* 5.9* 5.0   CHLORIDE 95 93* 94   CO2 24 26 26   BUN 61* 60* 51*   CR 3.44* 3.00* 2.46*   ANIONGAP 15* 13 11    KARINA 8.9 9.0 8.4*   GLC 72 148* 149*     Most Recent 2 LFT's:  Recent Labs   Lab Test 01/17/19  0254 01/09/19  0502   * 13   * 12   ALKPHOS 54 54   BILITOTAL 1.3 0.6     Most Recent INR's and Anticoagulation Dosing History:  Anticoagulation Dose History     Recent Dosing and Labs Latest Ref Rng & Units 1/11/2019 1/13/2019 1/14/2019 1/15/2019 1/16/2019 1/16/2019 1/17/2019    Warfarin 1 mg - - - - 1 mg - - -    Warfarin 4 mg - - 4 mg 4 mg - - - -    INR 0.86 - 1.14 2.72(H) 2.43(H) 2.35(H) 3.38(H) 5.19(HH) 5.03(HH) 8.97(HH)        Most Recent 3 Troponin's:  Recent Labs   Lab Test 12/30/18  0447 12/30/18  0110 09/08/18  2220   TROPI 0.102* 0.106* 0.040*     Most Recent Cholesterol Panel:  Recent Labs   Lab Test 01/04/19  0415   CHOL 168   LDL 77   HDL 77   TRIG 71     Most Recent 6 Bacteria Isolates From Any Culture (See EPIC Reports for Culture Details):  Recent Labs   Lab Test 01/03/19  2112   CULT >100,000 colonies/mL  Coagulase negative Staphylococcus  *  10,000 to 50,000 colonies/mL  urogenital raphael  Susceptibility testing not routinely done       Most Recent TSH, T4 and A1c Labs:  Recent Labs   Lab Test 01/12/19  0459  12/31/18  0120   TSH 3.80   < >  --    T4 1.44   < >  --    A1C  --   --  5.1    < > = values in this interval not displayed.     Results for orders placed or performed during the hospital encounter of 12/30/18   XR Chest Port 1 View    Narrative    XR CHEST PORT 1 VW  12/30/2018 2:54 AM      HISTORY: hypoxia    COMPARISON: 9/8/2018    FINDINGS: AP chest semiupright right endotracheal lower thoracic  trachea. Left chest implantable cardiac defibrillator in place.  Massive cardiac silhouette similar to prior. No definite pleural  effusion, or pneumothorax. Improved bibasilar opacities. Endotracheal  tube tip approximately 2 cm above ajay. Sternotomy and valvular  prosthesis. Partially visualized gastric tube. Visualized sidehole  appears to project over expected location of  stomach. Tip is excluded  from field-of-view.      Impression    IMPRESSION:  1.  Unchanged massive cardiac silhouette. Improved bibasilar  opacities.  2.  Endotracheal tube tip approximately 2 cm above ajay.      I have personally reviewed the examination and initial interpretation  and I agree with the findings.    BRENNEN OLIVARES MD   XR Chest Port 1 View    Narrative    XR CHEST PORT 1 VW  12/30/2018 2:55 AM      HISTORY: new IJ    COMPARISON: Earlier same day    FINDINGS: Right IJ catheter tip/sheath projects near the innominate  confluence. Endotracheal tube tip now at the midthoracic trachea. 2.3  cm linear radiopaque density projected over cardiac silhouette dates  back to 9/8/2018.  Otherwise stable exam since same date 0129 hours.      Impression    IMPRESSION: Right IJ catheter tip/sheath projects near the innominate  confluence.    I have personally reviewed the examination and initial interpretation  and I agree with the findings.    BRENNEN OLIVARES MD   XR Chest Port 1 View    Narrative    XR CHEST PORT 1 VW  12/30/2018 4:09 AM      HISTORY: new swan placement    COMPARISON: Earlier same day    FINDINGS: Right IJ [place]-Qiana catheter tip projects over the pulmonary  outflow tract. Other support devices are stable since earlier on the  same date at 0217 hours. Massive enlarged cardiac silhouette.      Impression    IMPRESSION: Right IJ [place]-Qiana catheter tip projects over the  pulmonary outflow tract.      I have personally reviewed the examination and initial interpretation  and I agree with the findings.    BRENNEN OLIVARES MD   XR Surgery MARY G/T 5 Min Fluoro    Narrative    This exam was marked as non-reportable because it will not be read by a   radiologist or a [place] non-radiologist provider.             XR Chest Port 1 View    Narrative    XR CHEST PORT 1 VW  12/30/2018 4:15 AM      HISTORY: swan    COMPARISON: Earlier same day    FINDINGS: Right IJ [place]-Qiana tip over the right lower  lobe/interlobar  pulmonary artery.  Mild bibasilar opacities.  Otherwise stable exam  since 0303 hours.      Impression    IMPRESSION: [place]-Qiana catheter tip appears more advanced and projects  over the right lower lobe/interlobar pulmonary artery.  Consider  retraction.    I have personally reviewed the examination and initial interpretation  and I agree with the findings.    BRENNEN OLIVARES MD   XR Chest Port 1 View    Narrative    XR CHEST PORT 1 VW  12/30/2018 4:16 AM      HISTORY: swan    COMPARISON: Earlier same day    FINDINGS: Right IJ [place]-Qiana catheter tip appears retracted since 0310  hours and now projects over the low right atrium. Otherwise stable  exam since 0310 hours.      Impression    IMPRESSION: Right IJ [place]-Qiana catheter tip now projects over the  right atrium.    I have personally reviewed the examination and initial interpretation  and I agree with the findings.    BRENNEN OLIVARES MD   XR Chest Port 1 View    Narrative    XR CHEST PORT 1 VW  12/30/2018 4:17 AM      HISTORY: swan    COMPARISON: Earlier same day    FINDINGS: [place]-Qiana catheter tip pulled further, now projects upper  over the right atrium. Otherwise stable exam since 0311 hours.      Impression    IMPRESSION: Right IJ [place]-Qiana catheter tip now further retracted, tip  projects over high right atrium.    I have personally reviewed the examination and initial interpretation  and I agree with the findings.    BRENNEN OLIVARES MD   XR Chest Port 1 View    Narrative    EXAMINATION: XR CHEST PORT 1 VW on 12/31/2018 8:44 AM.    INDICATION: Verify swan placement. History of heart failure secondary  to nonischemic dilated cardiomyopathy, rheumatic heart disease status  post mechanical mitral valve and aortic valve replacements, atrial  fibrillation and cardiac arrest.    COMPARISON: Chest x-rays dated 12/30/2018 and 9/8/2018.    FINDINGS: Portable AP supine view of the chest.     Postsurgical changes of cardiac surgery. Right IJ [place]-Qiana  catheter  with tip projecting over the right hilum at the level of the  interlobar artery. New aortic balloon pump projects over the aortic  knob about 1 cm above the ajay. Endotracheal tube tip projects 3.5  cm above the ajay. ICD projects over the left chest with leads  projecting over the right atrium and area of the right ventricle.  Cardiac valve prosthetic. Partially visualized gastric tube extends in  the left upper quadrant.    Trachea is midline. Cardiomediastinal silhouette is severely enlarged  and stable. Pulmonary vasculature is congested. No appreciable pleural  effusions or pneumothorax. Linear right midlung opacity, likely  represents atelectasis. Upper abdomen is unremarkable.      Impression    IMPRESSION:  1. Right IJ [place]-Qiana catheter tip projects over the right hilum at  the level of the interlobar artery.  2. New aortic balloon pump projects over the aortic knob at the level  of the junction of the distal aortic arch with the proximal descending  thoracic aorta.   3. Severe cardiomegaly, stable.  4. Pulmonary vascular congestion, unchanged.    I have personally reviewed the examination and initial interpretation  and I agree with the findings.    PRINCE OLIVO MD   XR Fluoro Port Time 0/1 Hour    Narrative    This exam was marked as non-reportable because it will not be read by a   radiologist or a [place] non-radiologist provider.             XR Chest Port 1 View    Narrative    EXAMINATION: XR CHEST PORT 1 VW on 12/31/2018 1:46 PM.    INDICATION: swan repositioned. History of heart failure secondary to  nonischemic dilated cardiomyopathy, rheumatic heart disease status  post mechanical mitral valve and aortic valve replacements, atrial  fibrillation and cardiac arrest.    COMPARISON: Chest x-rays dated 12/31/2018, 12/30/2018 and 9/8/2018.    FINDINGS: Portable AP supine view of the chest.     Postsurgical changes of cardiac surgery. Right IJ Harris-Qiana catheter  repositioned, with  tip projecting over the right main pulmonary  artery. Aortic balloon pump projects over the aortic knob about 1 cm  above the ajay, slightly inferior compared with the prior  radiograph. Endotracheal tube tip projects 3.2 cm above the ajay.  ICD projects over the left chest with leads projecting over the right  atrium and area of the right ventricle. Cardiac valve prosthetic.  Partially visualized gastric tube proximal sidehole projects just  distal to the GE junction.    Trachea is midline. Cardiomediastinal silhouette is severely enlarged  and stable. Pulmonary vasculature is congested. No appreciable pleural  effusions or pneumothorax. Linear right midlung opacity, likely  represents atelectasis. Upper abdomen is unremarkable.      Impression    IMPRESSION:  1. Right IJ [place]-Qiana catheter tip projects over the right main  pulmonary artery.  2. Aortic balloon pump projects over the aortic knob at the level of  the junction of the distal aortic arch with the proximal descending  thoracic aorta.   3. Marked cardiomegaly, stable.  4. Pulmonary vascular congestion, unchanged.    I have personally reviewed the examination and initial interpretation  and I agree with the findings.    PRINCE OLIVO MD   US abdomen complete    Narrative    EXAMINATION: US ABDOMEN COMPLETE  1/3/2019 8:32 PM      CLINICAL HISTORY: LVAD    COMPARISON: None        FINDINGS:  The liver is normal in contour and echogenicity. There is no  intrahepatic or extrahepatic biliary ductal dilatation. The common  bile duct measures 5 mm. The gallbladder wall is not  well-demonstrated. The gallbladder is otherwise normal, without  gallstones, or pericholecystic fluid.    The spleen measures maximally 10.6 cm and is normal in appearance. The  visualized portions of the pancreas are normal in echogenicity.    The visualized upper abdominal aorta and inferior vena cava are  normal.      The kidneys are normal in position and echogenicity. The right  kidney  measures 8.9 cm and the left kidney measures 8.2 cm. There is no  significant urinary tract dilation.      Impression    IMPRESSION:   Normal abdominal ultrasound.    I have personally reviewed the examination and initial interpretation  and I agree with the findings.    MAURISIO LARIOS MD    ISABELLA Doppler No Exercise    Narrative    Exam: Bilateral lower extremity resting ankle brachial indices dated  1/4/2019 2:06 PM    Comparison study: None    Clinical history: LVAD workup    Ordering provider: JANET ESTEBAN    Technique: Bilateral lower extremity resting ankle brachial indices  obtained.    Findings:    Right:      Arm: 90 mmHg   PT at ankle: 158 mmHg   DP at foot: 123 mmHg   Toe pressure 97 mmHg     ISABELLA: 1.60   TBI: 0.98    Right pulse volume recordings or VPR:      5th Digit PPG: Normal    Left:     Arm: 99 mmHg   PT at ankle: 136 mmHg   DP at foot: 126 mmHg   Toe pressure 83 mmHg     ISABELLA: 1.37   TBI: 0.84    Left pulse volume recordings or VPR:     5th Digit PPG: Normal      Impression    Impression:     Right leg: Resting ISABELLA is 1.60. Elevated ABIs suggestive of underlying  noncompressible vessels and peripheral vascular disease of  indeterminate severity.    Left leg: Resting ISABELLA is 1.37. Borderline elevated ISABELLA is suggestive  of possible underlying noncompressible vessels and peripheral vascular  disease of indeterminate severity.     Guidelines:    ISABELLA Diagnostic Criteria (Based on criteria published in Circulation  2011; 124: 9267-2564):    > 1.4: Non compressible    1.00 - 1.40: Normal    0.91 - 0.99: Borderline    At or below 0.90: Abnormal    ISABELLA Diagnostic Criteria (Based on ACC/AHA guideline 2008):    >/=1.3 - non compressible vessels    1.00  -1.29 - Normal    0.91 - 0.99 - Borderline    0.41 - 0.90 - Mild to moderate PAD    0.00 - 0.40 - Severe PAD    I have personally reviewed the examination and initial interpretation  and I agree with the findings.    DOUGIE ESCOBAR MD     Lower Extremity Arterial Duplex Bilateral    Narrative    US LOWER EXTREMITY ARTERIAL DUPLEX BILATERAL  1/3/2019 8:29 PM      HISTORY: LVAD    COMPARISON: None    FINDINGS: Color Doppler and spectral waveform ultrasonographic  evaluation of the bilateral lower extremity arteries. There is no  evidence of hemodynamically significant stenosis by velocity criteria.  Normal high resistance arterial waveforms are present bilaterally. No  other gross abnormal normality.      Impression    IMPRESSION: Normal arterial ultrasound.    I have personally reviewed the examination and initial interpretation  and I agree with the findings.    MAURISIO LARIOS MD   CT Head w/o contrast*    Narrative    CT HEAD W/O CONTRAST 1/3/2019 5:30 PM    Provided History: SAH suspected, initial exam; lvad    Comparison: None.    Technique: Using multidetector thin collimation helical acquisition  technique, axial, coronal and sagittal CT images from the skull base  to the vertex were obtained without intravenous contrast.     Findings:    No intracranial hemorrhage, mass effect, or midline shift. The  ventricles are proportionate to the cerebral sulci. The gray to white  matter differentiation of the cerebral hemispheres is preserved. The  basal cisterns are patent. 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    Mild right maxillary sinus mucosal thickening. The mastoid air cells  are clear.       Impression    Impression: No acute intracranial pathology.     I have personally reviewed the examination and initial interpretation  and I agree with the findings.    ADORE WEST MD   CT Chest Abdomen Pelvis w/o Contrast    Narrative    Examination:  CT CHEST ABDOMEN PELVIS W/O CONTRAST, 1/3/2019 5:32 PM     Comparison: Chest radiograph 12/31/2018    History: Sepsis; LVAD EVAL    Technique: Volumetric helical acquisition of CT images from the  thoracic inlet to the symphysis pubis. Coronal and sagittal images  and  axial MIP images were reconstructed from the source data.    Findings:  Chest:  Right internal jugular [place]-Qiana catheter tip terminates in the right  main pulmonary artery. Cardiac device in place with leads in the right  atrium and ventricle. The heart is massively enlarged. Mitral and  aortic valve prostheses. The visualized thyroid is unremarkable. No  mediastinal lymphadenopathy. Small to moderate right pleural effusion.  No focal consolidation or pneumothorax.    Abdomen/pelvis:  Dilation of the hepatic veins, otherwise unremarkable noncontrast  appearance of the liver, kidneys, adrenal glands, and pancreas.  Innumerable calcified gallstones without gallbladder wall thickening  or pericholecystic fluid. Splenic capsular calcification and  retraction likely related to prior infarct. There are no dilated loops  of large or small bowel. There is a large amount of gas in the rectal  vault.The abdominal aorta is within normal limits for caliber. There  is no intra-abdominal or pelvic free air, fluid, or lymphadenopathy.  The bladder is predominantly decompressed with a Dukes catheter in  place. No pelvic masses.    Bones:  No acute osseus abnormality or suspicious bony lesion.      Impression    Impression:   1. Support devices above.  2. Massive cardiomegaly with aortic and mitral valve prostheses.  3. Dilation of the hepatic veins likely related to increased right  heart pressures.  4. Cholelithiasis without cholecystitis.    I have personally reviewed the examination and initial interpretation  and I agree with the findings.    MAURISIO LARIOS MD   XR Chest 2 Views    Narrative    Exam: XR CHEST 2 VW, 1/3/2019 5:39 PM    Indication: LVAD EVAL    Comparison: CT cap 1/3/2019, chest x-ray 12/31/2018    Findings:   PA and lateral views the chest. Interval removal of the endotracheal  tube, intra-aortic balloon pump and the enteric tube. Right IJ  [place]-Qiana catheter tip projecting over the right pulmonary  artery.  Left chest wall cardiac device with leads in stable position. Cardiac  valve prosthesis. The cardiomediastinal silhouette is enlarged,  stable. Mild pulmonary vascular congestion. No pneumothorax. Small  right pleural effusion and overlying atelectasis. The visualized upper  abdomen appears unremarkable.      Impression    Impression:   1. Right IJ [place]-Qiana catheter tip projecting over the right pulmonary  artery. Other support devices as described above.  2. Stable cardiomegaly and pulmonary vascular congestion.  3. Small right pleural effusion and overlying atelectasis.    I have personally reviewed the examination and initial interpretation  and I agree with the findings.    MAUIRSIO LARIOS MD   CT Head w/o Contrast    Narrative    CT HEAD W/O CONTRAST 1/4/2019 8:48 PM    Provided History: Head trauma, ataxia; 67yo F admitted for VT storm,  new fall 1/4 evening, loss of consciousness, neuro exam w/o deficits,  on heparin, evaluate for bleed  ICD-10:    Comparison: 1/3/2019.    Technique: Using multidetector thin collimation helical acquisition  technique, axial, coronal and sagittal CT images from the skull base  to the vertex were obtained without intravenous contrast.     Findings:    No intracranial hemorrhage, mass effect, or midline shift. The  ventricles are proportionate to the cerebral sulci. The gray to white  matter differentiation of the cerebral hemispheres is preserved. The  basal cisterns are patent.    The visualized paranasal sinuses are clear. The mastoid air cells are  clear.       Impression    Impression: No acute intracranial pathology.     I have personally reviewed the examination and initial interpretation  and I agree with the findings.    JERI NG MD   XR Chest Port 1 View    Narrative    Exam: XR CHEST PORT 1 VW, 1/5/2019 9:27 AM    Indication: swan placement    Comparison: 1/3/2019    Findings:   The patient's [place]-Qiana tip is now in the pulmonary outflow tract  with  a a loop in the right atrium. Pacemaker and its leads are stable.    Enlarged cardiac silhouette with pulmonary venous congestion and mild  interstitial edema is stable. Bilateral pleural effusions with  associated atelectasis right greater than left is stable.      Impression    Impression: Stable appearance of the chest except for the [place]  catheter that has been advanced. There is now a loop in the atrium of  the stomach and the tip is now in the pulmonary outflow tract.    DOROTHY HURTADO MD   XR Chest Port 1 View    Narrative    Exam: XR CHEST PORT 1 VW, 1/5/2019 12:11 PM    Indication: PICC    Comparison: Earlier today    Findings:   No change in the position of the [place] catheter with its tip in  the main pulmonary artery. Right arm PICC tip in the high right  atrium. Pacemaker and its 2 leads are stable. Heart is enlarged with  replaced bowel similar. Small effusions. No obvious interstitial  edema. Upper abdomen unremarkable. Associated atelectasis. Mild  pulmonary venous congestion      Impression    Impression:    1. New right arm PICC with its tip in the high right atrium. Support  devices are otherwise stable.  2. Continued enlarged cardiac silhouette and mild congestive heart  failure with associated effusions and atelectasis.  3. No superimposed airspace opacities to suggest pneumonia.    DOROTHY HURTADO MD   US carotid bilateral    Narrative    Exam: Bilateral carotid duplex Doppler ultrasound dated 1/8/2019 2:26  PM    Clinical history: Potential Heart Transplant Recipient Evaluation.  Assess vasculopathy, pt being evaluated for heart transplant    Comparison Study: None    Ordering provider: JANET ESTEBAN    Technique: Grayscale (B-mode) and duplex and spectral Doppler  ultrasound of the extracranial internal carotid, external carotid,  vertebral artery origins, right brachiocephalic/subclavian and left  subclavian arteries. Velocity measurements obtained with angle  correction at  or less than 60 degrees.    Findings:    Right side:     Plaque Morphology: Minimal predominantly echogenic, Smooth plaque at  the bifurcation.       Proximal CCA: 62/13 cm/sec     Mid CCA: 66/12 cm/sec     Distal CCA: 40/15 cm/sec     External CA: 34 cm/sec       Proximal ICA: 38/15 cm/sec     Mid ICA: 63/24 cm/sec     Distal ICA: 84/27 cm/sec       Vertebral artery: antegrade cm/sec     ICA/CCA ratio: 2.07     Left side:     Plaque Morphology: Minimal predominantly echogenic, Smooth plaque at  the carotid bulb.       Proximal CCA: 65/19 cm/sec     Mid CCA: 62/18 cm/sec     Distal CCA: 56/20 cm/sec     External CA: 44 cm/sec       Proximal ICA: 45/15 cm/sec     Mid ICA: 67/23 cm/sec     Distal ICA: 142/46 cm/sec       Vertebral artery: antegrade cm/sec     ICA/CCA ratio: 2.56       Impression    Impression:    1. Right side:        Degree of stenosis: Less than 50 % stenosis by velocity criteria.    2. Left side:         Degree of stenosis: 50 to 69% stenosis by velocity criteria.    Consensus Panel Gray-Scale and Doppler US Criteria for Diagnosis of  ICA Stenosis (Radiology 11/2003) additionally modified by Ruma et  al. in Journal of Vascular Surgery 1/2011, [id number].       Normal         ICA PSV < 140 cm/sec       Plaque Estimate None       ICA/CCA  PSV Ratio < 2.0       ICA EDV < 40 cm/sec       < 50%          ICA PSV < 140 cm/sec       Plaque Estimate < 50%       ICA/CCA  PSV Ratio < 2.0       ICA EDV < 40 cm/sec       50- 69%       ICA -230 cm/sec       Plaque Estimate > or = 50%       ICA/CCA PSV Ratio 2.0-4.0       ICA EDV  cm/sec         > or = 70%, less than near occlusion       ICA PSV > 230 cm/sec       Plaque Estimate > or = 50%       ICA/CCA Ratio > 4.0       ICA EDV > 100 cm/sec                                            Additional criteria from vascular surgery     > 80%       EDV > 120 cm/sec     I have personally reviewed the examination and initial interpretation  and I agree  with the findings.    DOUGIE ESCOBAR MD   XR Chest Port 1 View    Narrative    EXAM: XR CHEST PORT 1 VW  1/9/2019 7:36 PM     HISTORY:  IABP placement    COMPARISON: Chest radiograph dated 1/5/2019    FINDINGS: A single AP view of the chest is obtained. Right IJ  [place] catheter tip projects over the right pulmonary artery. Right  upper extremity PICC tip projects over the high right atrium.  Intra-aortic balloon pump marker projects over the aortic bulb.  Postoperative changes of aortic and mitral valve prostheses. Left  chest wall defibrillator and leads in stable position.    Trachea is midline. Unchanged cardiomegaly and pulmonary vascular  congestion. Loculated right and trace left pleural effusion. Streaky  bibasilar and perihilar opacities. The upper abdomen is unremarkable.      Impression    IMPRESSION:   1. Intra-aortic balloon pump marker projects over the aortic bulb.  Additional support devices as described above.  2. Unchanged massive cardiomegaly with interval slight increase in the  pulmonary edema.    I have personally reviewed the examination and initial interpretation  and I agree with the findings.    SUHAIL STORM MD   XR Chest Port 1 View    Narrative    EXAMINATION: XR CHEST PORT 1 VW on 1/10/2019 10:24 AM.    INDICATION: to eval [place] qiana positioning (SGC position chgd at  bedside per MD). History of heart failure secondary to nonischemic  dilated cardiomyopathy, rheumatic heart disease status post mechanical  mitral valve and aortic valve replacements, atrial fibrillation and  cardiac arrest.    COMPARISON: Chest x-rays dated 1/9/2019 and 1/5/2019.    FINDINGS: Portable AP supine view of the chest.     Right IJ [place] catheter tip projects over the right main pulmonary  artery. Right-sided PICC line tip projects over the right atrium. Left  chest ICD with leads projecting over the right atrium and right  ventricle. Intra-aortic balloon pump marker projects over the aortic  knob.  Median sternotomy wires. Mediastinal surgical clips. Cardiac  prostheses.    Trachea is midline. Cardiomediastinal silhouette is enlarged and  stable. Unchanged mild pulmonary vascular congestion. Unchanged small  bilateral loculated pleural effusions. Unchanged bibasilar streaky  opacities. The visualized upper abdomen is unremarkable.      Impression    IMPRESSION:  1. Right IJ [place] catheter tip projects over the right main  pulmonary artery.  2. Unchanged bibasilar streaky opacities. Likely represents  atelectasis.  3. Stable small bilateral loculated pleural effusions.  4. Mild pulmonary vascular congestion.  5. Stable enlargement of the cardiac silhouette.    I have personally reviewed the examination and initial interpretation  and I agree with the findings.    FIDELIA RODRÍGUEZ MD   US Aortic Imaging    Narrative    US AORTIC IMAGING, US EXTREMITY NON VASCULAR BILATERAL  1/10/2019 5:25  PM      HISTORY: Pre - New Pulse Device Placement - please evaluate the size  of the infrarenal aorta    COMPARISON: Abdominal ultrasound 1/3/2019    TECHNIQUE: Transabdominal grayscale imaging of the aorta and iliac  vessels as well as the axillary arteries    FINDINGS:   Mid aorta measures 1.3 cm.  Distal aorta and common iliac arteries are not visualized due to over  shadowing bowel gas.    Right external iliac artery measures 0.9 cm.  Left external iliac artery measures 0.6 cm.    Right axillary artery measures 0.6 cm    Left axillary artery measures 0.6 cm      Impression    IMPRESSION:   Infrarenal aorta and common iliac artery were unable to be visualized  due to over shadowing bowel gas. Measurements for the mid aorta,  external iliac arteries, and axillary arteries are normal as described  above.    I have personally reviewed the examination and initial interpretation  and I agree with the findings.    SUHAIL STORM MD   US Extremity Non Vascular Bilateral    Narrative    US AORTIC IMAGING, US EXTREMITY NON  VASCULAR BILATERAL  1/10/2019 5:25  PM      HISTORY: Pre - New Pulse Device Placement - please evaluate the size  of the infrarenal aorta    COMPARISON: Abdominal ultrasound 1/3/2019    TECHNIQUE: Transabdominal grayscale imaging of the aorta and iliac  vessels as well as the axillary arteries    FINDINGS:   Mid aorta measures 1.3 cm.  Distal aorta and common iliac arteries are not visualized due to over  shadowing bowel gas.    Right external iliac artery measures 0.9 cm.  Left external iliac artery measures 0.6 cm.    Right axillary artery measures 0.6 cm    Left axillary artery measures 0.6 cm      Impression    IMPRESSION:   Infrarenal aorta and common iliac artery were unable to be visualized  due to over shadowing bowel gas. Measurements for the mid aorta,  external iliac arteries, and axillary arteries are normal as described  above.    I have personally reviewed the examination and initial interpretation  and I agree with the findings.    SUHAIL STORM MD

## 2019-01-17 NOTE — PHARMACY-CONSULT NOTE
Patient is being treated for hyperkalemia. A pharmacy consult was initiated to review the patient's medication list for possible causes of hyperkalemia.  No medications on this patient's profile are likely to have the side effect of hyperkalemia.     Continue current medication regimen.     Eladio Santos, RosemaryD

## 2019-01-17 NOTE — DEATH PRONOUNCEMENT
MD DEATH PRONOUNCEMENT    Called to pronounce Nicolette Adan dead.    Physical Exam: Unresponsive to noxious stimuli    Patient was pronounced dead at 3:53 am: AM, 2019.    Active Problems:    VT (ventricular tachycardia) (H)       Infectious disease present?: NO    Communicable disease present? (examples: HIV, chicken pox, TB, Ebola, CJD) :  NO    Multi-drug resistant organism present? (example: MRSA): NO    Please consider an autopsy if any of the following exist:  NO Unexpected or unexplained death during or following any dental, medical, or surgical diagnostic treatment procedures.   NO Death of mother at or up to seven days after delivery.     NO All  and pediatric deaths.     NO Death where the cause is sufficiently obscure to delay completion of the death certificate.   NO Deaths in which autopsy would confirm a suspected illness/condition that would affect surviving family members or recipients of transplanted organs.     The following deaths must be reported to the 's Office:  NO A death that may be due entirely or in part to any factors other than natural disease (recent surgery, recent trauma, suspected abuse/neglect).   NO A death that may be an accident, suicide, or homicide.     NO Any sudden, unexpected death in which there is no prior history of significant heart disease or any other condition associated with sudden death.   NO A death under suspicious, unusual, or unexpected circumstances.    NO Any death which is apparently due to natural causes but in which the  does not have a personal physician familiar with the patient s medical history, social, or environmental situation or the circumstances of the terminal event.   NO Any death apparently due to Sudden Infant Death Syndrome.     NO Deaths that occur during, in association with, or as consequences of a diagnostic, therapeutic, or anesthetic procedure.   NO Any death in which a fracture of a major bone has  occurred within the past (6) six months.   NO A death of persons note seen by their physician within 120 days of demise.     NO Any death in which the  was an inmate of a public institution or was in the custody of Law Enforcement personnel.   NO  All unexpected deaths of children   NO Solid organ donors   NO Unidentified bodies   NO Deaths of persons whose bodies are to be cremated or otherwise disposed of so that the bodies will later be unavailable for examination;   NO Deaths unattended by a physician outside of a licensed healthcare facility or licensed residential hospice program   NO Deaths occurring within 24 hours of arrival to a health care facility if death is unexpected.    NO Deaths associated with the decedent s employment.   NO Deaths attributed to acts of terrorism.   NO Any death in which there is uncertainty as to whether it is a medical examiner s care should be discussed with the medical investigator.        Body disposition: Autopsy was discussed with family member:  Spouse in person.  Permission for autopsy was declined.

## 2019-01-17 NOTE — PLAN OF CARE
D: Admitted 12/1 from outside hospital after Vtach episode resulting in a fall and ICD firing multiple times.    I: Monitored vitals and assessed pt status.   Changed: Had a care conference with cards 2 in the AM.     PRN: PRN Baclofan added for muscle spams.    A: A0x4. VSS on RA. Hydralazine held in the afternoon due to softer pressures. Afebrile. Urinating adequately. Pt states they do not want to be present during bedside report, does not want to hear the same news over and over. C/o of short episodes of nausea, but declined interventions.      P: Continue to monitor Pt status and report changes to cards 2 treatment team. Possible discharge on Saturday.

## 2019-01-17 NOTE — PROVIDER NOTIFICATION
MD Notification    MD Notified: Cards 2 Cross cover, Dr. AZALEA Lopez     Notification date/time: 01/17/19 0000    Notification interaction: MD paged     Purpose of notification: Pt evening BMP results back. K 5.9.     Comments: Received orders for IV insulin and PO Kayexalate (see MAR). Will continue to monitor and notify MD of pertinent changes.

## 2019-01-17 NOTE — PROGRESS NOTES
After pt death, family in room with pt. Collected belongings, declined seeing . Family gave information about  home of choice and said goodbye. This writer contacted [facility] who contacted the family. ICD was shut off by device nurse. Security up to transport body. [facility] was notified.

## 2019-01-17 NOTE — PROGRESS NOTES
"MD Notification    MD Notified: Cards 2 Cross Cover     Notification date/time: 01/17/19 0216    Notification interaction: MD paged    Purpose of notification: Pt continues to have worsening confusion, unable to hold conversation make sentences. Pt unable/ not appropriate to take PO Kayexalate. Pt also having increase in Oxygen requirements.     Comments: Spoke with MD on phone. MD agreed to not give PO meds at this time, labs were ordered.  Family was notified by physician of pts condition. Pt also mentioning \"I'm going to die,\" requesting to see her children. Pt blood glucose low, requiring IV dextrose, BS improved. Rapid response called d/t critical lactic. See rapid response note.   "

## 2019-01-17 NOTE — PROVIDER NOTIFICATION
Notified Cards doctor that pt lactic acid 4.5 and rapid response called per protocol;  Also, notified MD that continues with myoclonic jerking and requested Ammonia level to be drawn;

## (undated) DEVICE — INTRODUCER SHEATH FAST-CATH 7FRX12CM 406244

## (undated) DEVICE — WIRE GUIDE 0.025"X150CM TERUMO GLIDEWIRE ANG GR2504

## (undated) DEVICE — Device

## (undated) DEVICE — INTRO SHEATH 7FRX10CM PINNACLE RSS702

## (undated) DEVICE — WIRE GUIDE 0.035"X150CM EMERALD J TIP 502521

## (undated) DEVICE — SLEEVE REPOSITIONING W/CATH LOCK 60CM 406503

## (undated) DEVICE — INTRODUCER SHEATH 4FRX40CM MICROPUNC PED G47946

## (undated) DEVICE — KIT RIGHT HEART CATH H965601307191

## (undated) DEVICE — CATH BALLOON IABP 7.5FRX40ML INTRA-AORTIC 0684-00-0568-01

## (undated) DEVICE — PACK HEART RIGHT CUSTOM SAN32RHF18

## (undated) DEVICE — INTRO SHEATH 8FRX10CM PINNACLE RSS802

## (undated) RX ORDER — PANTOPRAZOLE SODIUM 40 MG/10ML
INJECTION, POWDER, LYOPHILIZED, FOR SOLUTION INTRAVENOUS
Status: DISPENSED
Start: 2018-01-01

## (undated) RX ORDER — FENTANYL CITRATE 50 UG/ML
INJECTION, SOLUTION INTRAMUSCULAR; INTRAVENOUS
Status: DISPENSED
Start: 2019-01-01

## (undated) RX ORDER — PHYTONADIONE 10 MG/ML
INJECTION, EMULSION INTRAMUSCULAR; INTRAVENOUS; SUBCUTANEOUS
Status: DISPENSED
Start: 2018-01-01

## (undated) RX ORDER — SODIUM CHLORIDE 9 MG/ML
INJECTION, SOLUTION INTRAVENOUS
Status: DISPENSED
Start: 2018-01-01

## (undated) RX ORDER — FUROSEMIDE 10 MG/ML
INJECTION INTRAMUSCULAR; INTRAVENOUS
Status: DISPENSED
Start: 2018-01-01

## (undated) RX ORDER — LIDOCAINE HYDROCHLORIDE 10 MG/ML
INJECTION, SOLUTION EPIDURAL; INFILTRATION; INTRACAUDAL; PERINEURAL
Status: DISPENSED
Start: 2019-01-01